# Patient Record
Sex: FEMALE | Race: BLACK OR AFRICAN AMERICAN | Employment: FULL TIME | URBAN - METROPOLITAN AREA
[De-identification: names, ages, dates, MRNs, and addresses within clinical notes are randomized per-mention and may not be internally consistent; named-entity substitution may affect disease eponyms.]

---

## 2017-10-04 ENCOUNTER — OFFICE VISIT (OUTPATIENT)
Dept: INTERNAL MEDICINE CLINIC | Age: 27
End: 2017-10-04

## 2017-10-04 VITALS
BODY MASS INDEX: 50.02 KG/M2 | HEART RATE: 86 BPM | HEIGHT: 64 IN | SYSTOLIC BLOOD PRESSURE: 110 MMHG | DIASTOLIC BLOOD PRESSURE: 68 MMHG | WEIGHT: 293 LBS | OXYGEN SATURATION: 98 %

## 2017-10-04 DIAGNOSIS — Z23 NEED FOR TDAP VACCINATION: ICD-10-CM

## 2017-10-04 DIAGNOSIS — Z11.3 SCREEN FOR STD (SEXUALLY TRANSMITTED DISEASE): ICD-10-CM

## 2017-10-04 DIAGNOSIS — E55.9 VITAMIN D DEFICIENCY: ICD-10-CM

## 2017-10-04 DIAGNOSIS — F41.9 ANXIETY: ICD-10-CM

## 2017-10-04 DIAGNOSIS — F32.A DEPRESSION, UNSPECIFIED DEPRESSION TYPE: Primary | ICD-10-CM

## 2017-10-04 DIAGNOSIS — Z76.89 ENCOUNTER TO ESTABLISH CARE: ICD-10-CM

## 2017-10-04 DIAGNOSIS — Z23 NEED FOR INFLUENZA VACCINATION: ICD-10-CM

## 2017-10-04 DIAGNOSIS — Z23 FLU VACCINE NEED: ICD-10-CM

## 2017-10-04 DIAGNOSIS — Z00.00 HEALTHCARE MAINTENANCE: ICD-10-CM

## 2017-10-04 PROCEDURE — 90471 IMMUNIZATION ADMIN: CPT | Performed by: NURSE PRACTITIONER

## 2017-10-04 PROCEDURE — 4004F PT TOBACCO SCREEN RCVD TLK: CPT | Performed by: NURSE PRACTITIONER

## 2017-10-04 PROCEDURE — G8417 CALC BMI ABV UP PARAM F/U: HCPCS | Performed by: NURSE PRACTITIONER

## 2017-10-04 PROCEDURE — G8484 FLU IMMUNIZE NO ADMIN: HCPCS | Performed by: NURSE PRACTITIONER

## 2017-10-04 PROCEDURE — G8427 DOCREV CUR MEDS BY ELIG CLIN: HCPCS | Performed by: NURSE PRACTITIONER

## 2017-10-04 PROCEDURE — 99203 OFFICE O/P NEW LOW 30 MIN: CPT | Performed by: NURSE PRACTITIONER

## 2017-10-04 PROCEDURE — 90686 IIV4 VACC NO PRSV 0.5 ML IM: CPT | Performed by: NURSE PRACTITIONER

## 2017-10-04 RX ORDER — BUSPIRONE HYDROCHLORIDE 7.5 MG/1
7.5 TABLET ORAL 3 TIMES DAILY
Qty: 90 TABLET | Refills: 0 | Status: SHIPPED | OUTPATIENT
Start: 2017-10-04 | End: 2018-02-10

## 2017-10-04 ASSESSMENT — PATIENT HEALTH QUESTIONNAIRE - PHQ9
2. FEELING DOWN, DEPRESSED OR HOPELESS: 0
SUM OF ALL RESPONSES TO PHQ QUESTIONS 1-9: 0
1. LITTLE INTEREST OR PLEASURE IN DOING THINGS: 0
SUM OF ALL RESPONSES TO PHQ9 QUESTIONS 1 & 2: 0

## 2017-10-04 NOTE — PROGRESS NOTES
Subjective:      Patient ID: Allan Olguin is a 33 y.o. female. HPI Comments: Patient presents with:  Established New Doctor: PCP. She reports a history of postpartum depression after birth of both children. Admits to a history of a suicide attempt (overdose on depression medication). She was admitted, treated and attended two meetings at [facility]. She has not been taking any medications for depression since overdose. She is unsure of medication she was previously taking. She admits to smoking as coping mechanism for depression and anxiety. Review of Systems   Psychiatric/Behavioral: Positive for dysphoric mood. The patient is nervous/anxious. All other systems reviewed and are negative.     Past Medical History:   Diagnosis Date    Postpartum depression 2015 & 2016    Psychiatric problem     Suicide attempt 2015    overdose on depression medication    UTI (urinary tract infection) 06/09/2016     Past Surgical History:   Procedure Laterality Date    CHOLECYSTECTOMY      TUBAL LIGATION  2013    TYMPANOSTOMY TUBE PLACEMENT Bilateral     as a child     Family History   Problem Relation Age of Onset    No Known Problems Mother     Mental Illness Father      PTSD- [employer]    ADHD Brother     No Known Problems Maternal Aunt     No Known Problems Maternal Uncle     No Known Problems Paternal Aunt     No Known Problems Paternal Uncle     Diabetes Maternal Grandmother     Hypertension Maternal Grandmother     Glaucoma Maternal Grandfather     No Known Problems Paternal Grandmother     No Known Problems Paternal Grandfather     No Known Problems Other     Rheum Arthritis Neg Hx     Osteoarthritis Neg Hx     Asthma Neg Hx     Breast Cancer Neg Hx     Cancer Neg Hx     Heart Failure Neg Hx     High Cholesterol Neg Hx     Migraines Neg Hx     Ovarian Cancer Neg Hx     Rashes/Skin Problems Neg Hx     Seizures Neg Hx     Stroke Neg Hx     Thyroid Disease Neg Hx      Social

## 2017-10-04 NOTE — MR AVS SNAPSHOT
right after having the test done may help you to stop smoking. Your doctor can also discuss with you all of your options and refer you to smoking-cessation support groups. You may wish to use nicotine replacement (gum, patches, inhaler) or one of the prescription medications that have been shown to increase quit rates and prolong abstinence from smoking. But whatever you and your doctor decide on these matters, it will still be you who decides when an how to quit. Here are some techniques:   Switch Brands   Switch to a brand you find distasteful. Change to a brand that is low in tar and nicotine a couple of weeks before your target quit date. This will help change your smoking behavior. However, do not smoke more cigarettes, inhale them more often or more deeply, or place your fingertips over the holes in the filters. All of these actions will increase your nicotine intake, and the idea is to get your body used to functioning without nicotine. Cut Down the Number of Cigarettes You Smoke   Smoke only half of each cigarette. Each day, postpone the lighting of your first cigarette by one hour. Decide you'll only smoke during odd or even hours of the day. Decide beforehand how many cigarettes you'll smoke during the day. For each additional cigarette, give a dollar to your favorite paul. Change your eating habits to help you cut down. For example, drink milk, which many people consider incompatible with smoking. End meals or snacks with something that won't lead to a cigarette. Reach for a glass of juice instead of a cigarette for a \"pick-me-up. \"   Remember: Cutting down can help you quit, but it's not a substitute for quitting. If you're down to about seven cigarettes a day, it's time to set your target quit date, and get ready to stick to it. Don't Smoke \"Automatically\"   Smoke only those cigarettes you really want. Catch yourself before you light up a cigarette out of pure habit. Don't empty your ashtrays. This will remind you of how many cigarettes you've smoked each day, and the sight and the smell of stale cigarettes butts will be very unpleasant. Make yourself aware of each cigarette by using the opposite hand or putting cigarettes in an unfamiliar location or a different pocket to break the automatic reach. If you light up many times during the day without even thinking about it, try to look in a mirror each time you put a match to your cigarette. You may decide you don't need it. Make Smoking Inconvenient   Stop buying cigarettes by the carton. Wait until one pack is empty before you buy another. Stop carrying cigarettes with you at home or at work. Make them difficult to get to. Make Smoking Unpleasant   Smoke only under circumstances that aren't especially pleasurable for you. If you like to smoke with others, smoke alone. Turn your chair to an empty corner and focus only on the cigarette you are smoking and all its many negative effects. Collect all your cigarette butts in one large glass container as a visual reminder of the filth made by smoking. Just Before Quitting   Practice going without cigarettes. Don't think of never smoking again. Think of quitting in terms of one day at a time . Tell yourself you won't smoke today, and then don't. Clean your clothes to rid them of the cigarette smell, which can linger a long time. On the Day You Quit   Throw away all your cigarettes and matches. Hide your lighters and ashtrays. Visit the dentist and have your teeth cleaned to get rid of tobacco stains. Notice how nice they look and resolve to keep them that way. Make a list of things you'd like to buy for yourself or someone else. Estimate the cost in terms of packs of cigarettes, and put the money aside to buy these presents. Keep very busy on the big day. Go to the movies, exercise, take long walks, or go bike riding. Offers individual hypnosis, behavior modifications, and counseling in this program. Three sessions over 2-week period  $155 (total cost)    Nicotine Anonymous: [phone]  Open group cessation - everyone welcome     American Cancer Society: 329.528.6423    American Lung Association: 1-800-LUNG-[place]    On-line help:  www.cancer. org  www.quitnet. org  www. Vimagino. Where  www.stopsmokingsuppport. Where  www. ffsonline. org        Vaccine Information Sheet, \"Influenza - Inactivated\"  given to Chantell Hines, or parent/legal guardian of  Chantell Hines and verbalized understanding. Patient responses:    Have you ever had a reaction to a flu vaccine? No  Are you able to eat eggs without adverse effects? Yes  Do you have any current illness? No  Have you ever had [place] Syndrome? No    Flu vaccine given per order. Please see immunization tab. Today's Medication Changes          These changes are accurate as of: 10/4/17  3:35 PM.  If you have any questions, ask your nurse or doctor. START taking these medications           busPIRone 7.5 MG tablet   Commonly known as:  BUSPAR   Instructions: Take 1 tablet by mouth 3 times daily   Quantity:  90 tablet   Refills:  0   Started by:  Andrea Cruz CNP       sertraline 50 MG tablet   Commonly known as:  ZOLOFT   Instructions:   Take 1 tablet by mouth daily   Quantity:  30 tablet   Refills:  1   Started by:  Andrea Cruz CNP         STOP taking these medications           ketorolac 10 MG tablet   Commonly known as:  TORADOL   Stopped by:  Andrea Cruz CNP            Where to Get Your Medications      These medications were sent to [facility], Alvino Pang, [address] [place] [address]     Phone:  [phone]     busPIRone 7.5 MG tablet    sertraline 50 MG tablet               Your Current Medications Are If you have questions, please contact the physician practice where you receive care. Remember, MyChart is NOT to be used for urgent needs. For medical emergencies, dial 911. For questions regarding your [facility] account call [phone]. If you have a clinical question, please call your doctor's office.

## 2017-10-04 NOTE — PATIENT INSTRUCTIONS
weeks before your target quit date. This will help change your smoking behavior. However, do not smoke more cigarettes, inhale them more often or more deeply, or place your fingertips over the holes in the filters. All of these actions will increase your nicotine intake, and the idea is to get your body used to functioning without nicotine. Cut Down the Number of Cigarettes You Smoke   Smoke only half of each cigarette. Each day, postpone the lighting of your first cigarette by one hour. Decide you'll only smoke during odd or even hours of the day. Decide beforehand how many cigarettes you'll smoke during the day. For each additional cigarette, give a dollar to your favorite paul. Change your eating habits to help you cut down. For example, drink milk, which many people consider incompatible with smoking. End meals or snacks with something that won't lead to a cigarette. Reach for a glass of juice instead of a cigarette for a \"pick-me-up. \"   Remember: Cutting down can help you quit, but it's not a substitute for quitting. If you're down to about seven cigarettes a day, it's time to set your target quit date, and get ready to stick to it. Don't Smoke \"Automatically\"   Smoke only those cigarettes you really want. Catch yourself before you light up a cigarette out of pure habit. Don't empty your ashtrays. This will remind you of how many cigarettes you've smoked each day, and the sight and the smell of stale cigarettes butts will be very unpleasant. Make yourself aware of each cigarette by using the opposite hand or putting cigarettes in an unfamiliar location or a different pocket to break the automatic reach. If you light up many times during the day without even thinking about it, try to look in a mirror each time you put a match to your cigarette. You may decide you don't need it. Make Smoking Inconvenient   Stop buying cigarettes by the carton.  Wait until one pack is empty before you buy after you quit, spend as much free time as possible in places where smoking isn't allowed, such as [address] [place] [address], museums, theaters, department stores, and churches. Drink large quantities of water and fruit juice (but avoid sodas that contain caffeine). Try to avoid alcohol, coffee, and other beverages that you associate with cigarette smoking. Strike up conversation instead of a match for a cigarette. If you miss the sensation of having a cigarette in your hand, play with something elsea pencil, a paper clip, a marble. If you miss having something in your mouth, try toothpicks or a fake cigarette. Here are some useful phone numbers and resources for you to help your smoking cessation. [address] [place] [address] Street: [phone]  Smoking cessation programs in [facility]: [phone]  \"Freshstart\" a 4-session program for smoking cessation - group sessions    [facility] Use Prevention and Control [facility]: 1800-QUIT-NOW     [facility]: 972-801-SNAH  \"Freshstart' - 4-session program for smoking cessation - group sessions    Vanessa Szymanski: [phone]  Personal Smoking cessation consultations - teens and adults  [place] By appointment $343    [facility]: [phone]  Offers individual hypnosis, behavior modifications, and counseling in this program. Three sessions over 2-week period  $155 (total cost)    Nicotine Anonymous: [phone]  Open group cessation - everyone welcome     American Cancer Society: [phone]    American Lung Association: 1-800-LUNG-USA    On-line help:  www.cancer. org  www.quitnet. org  www. whyquit. com  www.stopsmokingsuppport. com  www. ffsonline. org        Vaccine Information Sheet, \"Influenza - Inactivated\"  given to Alma Okeefe, or parent/legal guardian of  Alma Okeefe and verbalized understanding. Patient responses:    Have you ever had a reaction to a flu vaccine?  No  Are you able to eat eggs without adverse

## 2018-05-17 ENCOUNTER — OFFICE VISIT (OUTPATIENT)
Dept: INTERNAL MEDICINE CLINIC | Age: 28
End: 2018-05-17

## 2018-05-17 VITALS
WEIGHT: 284 LBS | OXYGEN SATURATION: 96 % | SYSTOLIC BLOOD PRESSURE: 110 MMHG | DIASTOLIC BLOOD PRESSURE: 80 MMHG | BODY MASS INDEX: 48.75 KG/M2 | HEART RATE: 84 BPM

## 2018-05-17 DIAGNOSIS — F32.A DEPRESSION, UNSPECIFIED DEPRESSION TYPE: ICD-10-CM

## 2018-05-17 DIAGNOSIS — Z00.00 HEALTHCARE MAINTENANCE: ICD-10-CM

## 2018-05-17 DIAGNOSIS — Z00.00 ROUTINE GENERAL MEDICAL EXAMINATION AT A HEALTH CARE FACILITY: ICD-10-CM

## 2018-05-17 DIAGNOSIS — Z23 NEED FOR 23-POLYVALENT PNEUMOCOCCAL POLYSACCHARIDE VACCINE: ICD-10-CM

## 2018-05-17 DIAGNOSIS — Z00.01 ENCOUNTER FOR WELL ADULT EXAM WITH ABNORMAL FINDINGS: Primary | ICD-10-CM

## 2018-05-17 PROCEDURE — G8417 CALC BMI ABV UP PARAM F/U: HCPCS | Performed by: NURSE PRACTITIONER

## 2018-05-17 PROCEDURE — G8427 DOCREV CUR MEDS BY ELIG CLIN: HCPCS | Performed by: NURSE PRACTITIONER

## 2018-05-17 PROCEDURE — 90732 PPSV23 VACC 2 YRS+ SUBQ/IM: CPT | Performed by: NURSE PRACTITIONER

## 2018-05-17 PROCEDURE — 99395 PREV VISIT EST AGE 18-39: CPT | Performed by: NURSE PRACTITIONER

## 2018-05-17 PROCEDURE — 4004F PT TOBACCO SCREEN RCVD TLK: CPT | Performed by: NURSE PRACTITIONER

## 2018-05-17 PROCEDURE — 90471 IMMUNIZATION ADMIN: CPT | Performed by: NURSE PRACTITIONER

## 2018-06-12 ENCOUNTER — OFFICE VISIT (OUTPATIENT)
Dept: INTERNAL MEDICINE CLINIC | Age: 28
End: 2018-06-12

## 2018-06-12 VITALS
HEART RATE: 89 BPM | SYSTOLIC BLOOD PRESSURE: 118 MMHG | DIASTOLIC BLOOD PRESSURE: 70 MMHG | WEIGHT: 286 LBS | OXYGEN SATURATION: 98 % | BODY MASS INDEX: 49.09 KG/M2

## 2018-06-12 DIAGNOSIS — F32.A DEPRESSION, UNSPECIFIED DEPRESSION TYPE: Primary | ICD-10-CM

## 2018-06-12 DIAGNOSIS — E66.01 MORBID OBESITY (HCC): ICD-10-CM

## 2018-06-12 PROCEDURE — G8417 CALC BMI ABV UP PARAM F/U: HCPCS | Performed by: NURSE PRACTITIONER

## 2018-06-12 PROCEDURE — 4004F PT TOBACCO SCREEN RCVD TLK: CPT | Performed by: NURSE PRACTITIONER

## 2018-06-12 PROCEDURE — G8427 DOCREV CUR MEDS BY ELIG CLIN: HCPCS | Performed by: NURSE PRACTITIONER

## 2018-06-12 PROCEDURE — 99213 OFFICE O/P EST LOW 20 MIN: CPT | Performed by: NURSE PRACTITIONER

## 2018-12-22 ENCOUNTER — HOSPITAL ENCOUNTER (EMERGENCY)
Age: 28
Discharge: HOME | End: 2018-12-22
Attending: EMERGENCY MEDICINE
Payer: COMMERCIAL

## 2018-12-22 VITALS
BODY MASS INDEX: 50.02 KG/M2 | WEIGHT: 293 LBS | SYSTOLIC BLOOD PRESSURE: 151 MMHG | HEART RATE: 102 BPM | TEMPERATURE: 98.2 F | RESPIRATION RATE: 18 BRPM | DIASTOLIC BLOOD PRESSURE: 105 MMHG | HEIGHT: 64 IN | OXYGEN SATURATION: 96 %

## 2018-12-22 DIAGNOSIS — L02.91 ABSCESS: Primary | ICD-10-CM

## 2018-12-22 PROCEDURE — 99282 EMERGENCY DEPT VISIT SF MDM: CPT

## 2018-12-22 RX ORDER — CEPHALEXIN 500 MG/1
500 CAPSULE ORAL 4 TIMES DAILY
Qty: 40 CAPSULE | Refills: 0 | Status: SHIPPED | OUTPATIENT
Start: 2018-12-22 | End: 2019-01-01

## 2018-12-22 RX ORDER — SULFAMETHOXAZOLE AND TRIMETHOPRIM 800; 160 MG/1; MG/1
1 TABLET ORAL 2 TIMES DAILY
Qty: 20 TABLET | Refills: 0 | Status: SHIPPED | OUTPATIENT
Start: 2018-12-22 | End: 2019-01-01

## 2018-12-22 ASSESSMENT — PAIN DESCRIPTION - LOCATION: LOCATION: GROIN

## 2018-12-22 ASSESSMENT — PAIN SCALES - GENERAL: PAINLEVEL_OUTOF10: 7

## 2018-12-22 ASSESSMENT — PAIN DESCRIPTION - PROGRESSION: CLINICAL_PROGRESSION: GRADUALLY WORSENING

## 2018-12-22 ASSESSMENT — PAIN DESCRIPTION - FREQUENCY: FREQUENCY: CONTINUOUS

## 2018-12-22 ASSESSMENT — PAIN DESCRIPTION - DESCRIPTORS: DESCRIPTORS: BURNING

## 2018-12-22 ASSESSMENT — PAIN DESCRIPTION - PAIN TYPE: TYPE: ACUTE PAIN

## 2018-12-22 ASSESSMENT — PAIN DESCRIPTION - ONSET: ONSET: GRADUAL

## 2018-12-22 NOTE — ED PROVIDER NOTES
eMERGENCY dEPARTMENT eNCOUnter        [facility]    Chief Complaint   Patient presents with    Abscess     right vaginal/ pubic area abscess first noticed two days ago, has hx of abscess. tried hot compress at home prior to ED visit        OWEN Scott is a 33 y.o. female who presents  With an abscess in the right inguinal area. Patient states that she noticed that over the past 2 days. She has no fever, nausea, or vomiting. No exacerbating factor other than touching the area which makes it hurt. Has not been draining. She has not taken any medication home and has not been on antibiotic recently. She has no urinary symptoms. REVIEW OF SYSTEMS    See [facility] for further details. Review of systems otherwise negative. temperature systems reviewed and is otherwise negative  PAST MEDICAL HISTORY    Past Medical History:   Diagnosis Date    Postpartum depression 2015 & 2016    Psychiatric problem     Suicide attempt ([address] 2015    overdose on depression medication    UTI (urinary tract infection) 06/09/2016       SURGICAL HISTORY    Past Surgical History:   Procedure Laterality Date    CHOLECYSTECTOMY      TUBAL LIGATION  2013    TYMPANOSTOMY TUBE PLACEMENT Bilateral     as a child       CURRENT MEDICATIONS    Current Outpatient Rx   Medication Sig Dispense Refill    cephALEXin (KEFLEX) 500 MG capsule Take 1 capsule by mouth 4 times daily for 10 days 40 capsule 0    sulfamethoxazole-trimethoprim (BACTRIM DS) 800-160 MG per tablet Take 1 tablet by mouth 2 times daily for 10 days 20 tablet 0    sertraline (ZOLOFT) 50 MG tablet Take 1 tablet by mouth daily 30 tablet 3    naproxen (NAPROSYN) 500 MG tablet Take 1 tablet by mouth 2 times daily (with meals) 20 tablet 0    Magic Mouthwash (MIRACLE MOUTHWASH) Swish and spit 10 mLs 4 times daily as needed for Irritation Dispense as Magic Mouthwash.   Please add Equal Parts: 60 mL Maalox, 60 mL Viscous Lidocaine, 60 mL Benadryl to 60mL of

## 2018-12-22 NOTE — ED NOTES
Pt states she has an abscess in groin area. Pt has tried epsom salt and soak in a warm bath, no improvement. Pt has a burning sensation. Pt states it is red and has a vasques.      Priyanka Corbett RN  12/22/18 3131

## 2019-07-09 ENCOUNTER — HOSPITAL ENCOUNTER (EMERGENCY)
Age: 29
Discharge: HOME | End: 2019-07-09

## 2019-07-09 VITALS
DIASTOLIC BLOOD PRESSURE: 86 MMHG | SYSTOLIC BLOOD PRESSURE: 123 MMHG | WEIGHT: 293 LBS | TEMPERATURE: 97.2 F | RESPIRATION RATE: 18 BRPM | HEIGHT: 64 IN | HEART RATE: 68 BPM | BODY MASS INDEX: 50.02 KG/M2 | OXYGEN SATURATION: 100 %

## 2019-07-09 DIAGNOSIS — R20.2 PARESTHESIAS: ICD-10-CM

## 2019-07-09 DIAGNOSIS — A59.9 TRICHOMONAS INFECTION: ICD-10-CM

## 2019-07-09 DIAGNOSIS — R58 ECCHYMOSIS: ICD-10-CM

## 2019-07-09 DIAGNOSIS — I83.90 RETICULAR VENOUS VARICES: ICD-10-CM

## 2019-07-09 DIAGNOSIS — M54.50 ACUTE BILATERAL LOW BACK PAIN WITHOUT SCIATICA: Primary | ICD-10-CM

## 2019-07-09 LAB
A/G RATIO: 0.9 (ref 1.1–2.2)
ALBUMIN SERPL-MCNC: 3.8 G/DL (ref 3.4–5)
ALP BLD-CCNC: 70 U/L (ref 40–129)
ALT SERPL-CCNC: 14 U/L (ref 10–40)
ANION GAP SERPL CALCULATED.3IONS-SCNC: 11 MMOL/L (ref 3–16)
APTT: 33.4 SEC (ref 26–36)
AST SERPL-CCNC: 15 U/L (ref 15–37)
BACTERIA: ABNORMAL /HPF
BASOPHILS ABSOLUTE: 0 K/UL (ref 0–0.2)
BASOPHILS RELATIVE PERCENT: 0.5 %
BILIRUB SERPL-MCNC: 0.3 MG/DL (ref 0–1)
BILIRUBIN URINE: NEGATIVE
BLOOD, URINE: ABNORMAL
BUN BLDV-MCNC: 10 MG/DL (ref 7–20)
CALCIUM SERPL-MCNC: 9.3 MG/DL (ref 8.3–10.6)
CHLORIDE BLD-SCNC: 105 MMOL/L (ref 99–110)
CLARITY: ABNORMAL
CO2: 21 MMOL/L (ref 21–32)
COLOR: YELLOW
CREAT SERPL-MCNC: 0.5 MG/DL (ref 0.6–1.1)
EOSINOPHILS ABSOLUTE: 0.1 K/UL (ref 0–0.6)
EOSINOPHILS RELATIVE PERCENT: 1.4 %
EPITHELIAL CELLS, UA: 2 /HPF (ref 0–5)
GFR AFRICAN AMERICAN: >60
GFR NON-AFRICAN AMERICAN: >60
GLOBULIN: 4.1 G/DL
GLUCOSE BLD-MCNC: 80 MG/DL (ref 70–99)
GLUCOSE URINE: NEGATIVE MG/DL
HCG QUALITATIVE: NEGATIVE
HCT VFR BLD CALC: 43 % (ref 36–48)
HEMOGLOBIN: 14.1 G/DL (ref 12–16)
HYALINE CASTS: 1 /LPF (ref 0–8)
INR BLD: 1.03 (ref 0.86–1.14)
KETONES, URINE: NEGATIVE MG/DL
LEUKOCYTE ESTERASE, URINE: ABNORMAL
LYMPHOCYTES ABSOLUTE: 2 K/UL (ref 1–5.1)
LYMPHOCYTES RELATIVE PERCENT: 25.6 %
MCH RBC QN AUTO: 32.5 PG (ref 26–34)
MCHC RBC AUTO-ENTMCNC: 32.8 G/DL (ref 31–36)
MCV RBC AUTO: 99 FL (ref 80–100)
MICROSCOPIC EXAMINATION: YES
MONOCYTES ABSOLUTE: 0.3 K/UL (ref 0–1.3)
MONOCYTES RELATIVE PERCENT: 4.2 %
NEUTROPHILS ABSOLUTE: 5.2 K/UL (ref 1.7–7.7)
NEUTROPHILS RELATIVE PERCENT: 68.3 %
NITRITE, URINE: NEGATIVE
PDW BLD-RTO: 13.4 % (ref 12.4–15.4)
PH UA: 7.5 (ref 5–8)
PLATELET # BLD: 185 K/UL (ref 135–450)
PMV BLD AUTO: 9.1 FL (ref 5–10.5)
POTASSIUM SERPL-SCNC: 3.9 MMOL/L (ref 3.5–5.1)
PROTEIN UA: NEGATIVE MG/DL
PROTHROMBIN TIME: 11.7 SEC (ref 9.8–13)
RBC # BLD: 4.34 M/UL (ref 4–5.2)
RBC UA: >100 /HPF (ref 0–2)
SODIUM BLD-SCNC: 137 MMOL/L (ref 136–145)
SPECIFIC GRAVITY UA: 1.02 (ref 1–1.03)
TOTAL PROTEIN: 7.9 G/DL (ref 6.4–8.2)
TRICHOMONAS: PRESENT /HPF
URINE REFLEX TO CULTURE: YES
URINE TYPE: ABNORMAL
UROBILINOGEN, URINE: 0.2 E.U./DL
WBC # BLD: 7.6 K/UL (ref 4–11)
WBC UA: 53 /HPF (ref 0–5)

## 2019-07-09 PROCEDURE — 87491 CHLMYD TRACH DNA AMP PROBE: CPT

## 2019-07-09 PROCEDURE — 87077 CULTURE AEROBIC IDENTIFY: CPT

## 2019-07-09 PROCEDURE — 84703 CHORIONIC GONADOTROPIN ASSAY: CPT

## 2019-07-09 PROCEDURE — 87086 URINE CULTURE/COLONY COUNT: CPT

## 2019-07-09 PROCEDURE — 96372 THER/PROPH/DIAG INJ SC/IM: CPT

## 2019-07-09 PROCEDURE — 99283 EMERGENCY DEPT VISIT LOW MDM: CPT

## 2019-07-09 PROCEDURE — 6370000000 HC RX 637 (ALT 250 FOR IP): Performed by: PHYSICIAN ASSISTANT

## 2019-07-09 PROCEDURE — 85730 THROMBOPLASTIN TIME PARTIAL: CPT

## 2019-07-09 PROCEDURE — 80053 COMPREHEN METABOLIC PANEL: CPT

## 2019-07-09 PROCEDURE — 6360000002 HC RX W HCPCS: Performed by: PHYSICIAN ASSISTANT

## 2019-07-09 PROCEDURE — 85025 COMPLETE CBC W/AUTO DIFF WBC: CPT

## 2019-07-09 PROCEDURE — 81001 URINALYSIS AUTO W/SCOPE: CPT

## 2019-07-09 PROCEDURE — 87591 N.GONORRHOEAE DNA AMP PROB: CPT

## 2019-07-09 PROCEDURE — 85610 PROTHROMBIN TIME: CPT

## 2019-07-09 RX ORDER — CEFTRIAXONE SODIUM 250 MG/1
250 INJECTION, POWDER, FOR SOLUTION INTRAMUSCULAR; INTRAVENOUS ONCE
Status: COMPLETED | OUTPATIENT
Start: 2019-07-09 | End: 2019-07-09

## 2019-07-09 RX ORDER — NAPROXEN 500 MG/1
500 TABLET ORAL 2 TIMES DAILY
Qty: 20 TABLET | Refills: 0 | Status: SHIPPED | OUTPATIENT
Start: 2019-07-09 | End: 2020-05-06

## 2019-07-09 RX ORDER — METRONIDAZOLE 250 MG/1
2000 TABLET ORAL ONCE
Status: COMPLETED | OUTPATIENT
Start: 2019-07-09 | End: 2019-07-09

## 2019-07-09 RX ORDER — AZITHROMYCIN 250 MG/1
1000 TABLET, FILM COATED ORAL ONCE
Status: COMPLETED | OUTPATIENT
Start: 2019-07-09 | End: 2019-07-09

## 2019-07-09 RX ADMIN — METRONIDAZOLE 2000 MG: 250 TABLET, FILM COATED ORAL at 14:42

## 2019-07-09 RX ADMIN — AZITHROMYCIN MONOHYDRATE 1000 MG: 250 TABLET ORAL at 14:43

## 2019-07-09 RX ADMIN — CEFTRIAXONE SODIUM 250 MG: 250 INJECTION, POWDER, FOR SOLUTION INTRAMUSCULAR; INTRAVENOUS at 14:44

## 2019-07-09 ASSESSMENT — ENCOUNTER SYMPTOMS
SHORTNESS OF BREATH: 0
BACK PAIN: 1
NAUSEA: 0
COLOR CHANGE: 1
ABDOMINAL PAIN: 0
DIARRHEA: 0
BLOOD IN STOOL: 0
VOMITING: 0

## 2019-07-09 ASSESSMENT — PAIN DESCRIPTION - PAIN TYPE: TYPE: ACUTE PAIN

## 2019-07-09 ASSESSMENT — PAIN SCALES - GENERAL: PAINLEVEL_OUTOF10: 7

## 2019-07-09 ASSESSMENT — PAIN DESCRIPTION - LOCATION: LOCATION: LEG

## 2019-07-09 NOTE — ED NOTES
Pt resting in bed.  Patient encouraged to try for urine specimen       Francisca Dubose RN  07/09/19 8240

## 2019-07-09 NOTE — ED PROVIDER NOTES
7/9/19 5534)       Patient presented with some discoloration of her bilateral legs with some unexplained ecchymosis that is very mild on the right thigh. With some back pain. Laboratory testing is unremarkable. Urinalysis does reveal some white blood cells with red blood cells and trichomonas present. She does want empiric treatment for gonorrhea and chlamydia. She was treated for gonorrhea, chlamydia and trichomonas here. Abdominal exam is benign. Low suspicion for tubo-ovarian abscess, PID, acute abdomen, cauda equina, epidural abscess, cord injury. Low suspicion for DVT or arterial occlusion or cellulitis. She has been able to easily ambulates. Has findings most consistent with mild ecchymosis and spider veins. She will follow-up with her family physician return if any worsening of symptoms or problems at home. She understood that her sexual partners any tested and treated also. She was stable discharge. The patient tolerated their visit well. I evaluated the patient. The physician was available for consultation as needed. The patient and / or the family were informed of the results of anytests, a time was given to answer questions, a plan was proposed and they agreed with plan. CLINICAL IMPRESSION:  1. Acute bilateral low back pain without sciatica    2. Paresthesias    3. Ecchymosis    4. Reticular venous varices    5.  Trichomonas infection        DISPOSITION Decision To Discharge 07/09/2019 03:04:51 PM      PATIENT REFERRED TO:  Loki Tolliver APRN - CNP  [address] [facility]  [phone]    Schedule an appointment as soon as possible for a visit in 3 days  For re-check    [facility] Emergency Department  [address] [place] [address]  [phone]    As needed      DISCHARGE MEDICATIONS:  Discharge Medication List as of 7/9/2019  3:04 PM      START taking these medications    Details   !! naproxen (NAPROSYN) 500 MG tablet Take 1 tablet by

## 2019-07-10 LAB
C TRACH DNA GENITAL QL NAA+PROBE: NEGATIVE
N. GONORRHOEAE DNA: NEGATIVE

## 2019-07-14 LAB
ORGANISM: ABNORMAL
URINE CULTURE, ROUTINE: ABNORMAL
URINE CULTURE, ROUTINE: ABNORMAL

## 2019-07-15 NOTE — ED NOTES
+ culture reviewed with DAMI Prakash CNP; if pt not feeling better she needs to be started on Bactrim DS 1 tab BID x 7 days; pt sts some better, bruising decreased.      Mariah Fuller RN  07/15/19 1002

## 2020-05-05 ENCOUNTER — OFFICE VISIT (OUTPATIENT)
Dept: PRIMARY CARE CLINIC | Age: 30
End: 2020-05-05
Payer: COMMERCIAL

## 2020-05-05 ENCOUNTER — HOSPITAL ENCOUNTER (EMERGENCY)
Age: 30
Discharge: LEFT BEFORE DISCHARGE | End: 2020-05-05

## 2020-05-05 ENCOUNTER — TELEPHONE (OUTPATIENT)
Dept: PRIMARY CARE CLINIC | Age: 30
End: 2020-05-05

## 2020-05-05 VITALS — HEART RATE: 62 BPM | OXYGEN SATURATION: 97 % | TEMPERATURE: 100.2 F

## 2020-05-05 LAB — S PYO AG THROAT QL: NORMAL

## 2020-05-05 PROCEDURE — 87880 STREP A ASSAY W/OPTIC: CPT | Performed by: INTERNAL MEDICINE

## 2020-05-05 PROCEDURE — 4004F PT TOBACCO SCREEN RCVD TLK: CPT | Performed by: INTERNAL MEDICINE

## 2020-05-05 PROCEDURE — 99213 OFFICE O/P EST LOW 20 MIN: CPT | Performed by: INTERNAL MEDICINE

## 2020-05-05 PROCEDURE — G8428 CUR MEDS NOT DOCUMENT: HCPCS | Performed by: INTERNAL MEDICINE

## 2020-05-05 PROCEDURE — G8417 CALC BMI ABV UP PARAM F/U: HCPCS | Performed by: INTERNAL MEDICINE

## 2020-05-06 ENCOUNTER — APPOINTMENT (OUTPATIENT)
Dept: CT IMAGING | Age: 30
End: 2020-05-06

## 2020-05-06 ENCOUNTER — TELEPHONE (OUTPATIENT)
Dept: INTERNAL MEDICINE CLINIC | Age: 30
End: 2020-05-06

## 2020-05-06 ENCOUNTER — HOSPITAL ENCOUNTER (EMERGENCY)
Age: 30
Discharge: HOME | End: 2020-05-06
Attending: EMERGENCY MEDICINE

## 2020-05-06 VITALS
WEIGHT: 293 LBS | TEMPERATURE: 99.1 F | OXYGEN SATURATION: 98 % | BODY MASS INDEX: 57.5 KG/M2 | RESPIRATION RATE: 14 BRPM | HEART RATE: 96 BPM | DIASTOLIC BLOOD PRESSURE: 77 MMHG | SYSTOLIC BLOOD PRESSURE: 134 MMHG

## 2020-05-06 LAB
ANION GAP SERPL CALCULATED.3IONS-SCNC: 13 MMOL/L (ref 3–16)
BASOPHILS ABSOLUTE: 0 K/UL (ref 0–0.2)
BASOPHILS RELATIVE PERCENT: 0 %
BUN BLDV-MCNC: 10 MG/DL (ref 7–20)
CALCIUM SERPL-MCNC: 9 MG/DL (ref 8.3–10.6)
CHLORIDE BLD-SCNC: 103 MMOL/L (ref 99–110)
CO2: 20 MMOL/L (ref 21–32)
CREAT SERPL-MCNC: 0.6 MG/DL (ref 0.6–1.1)
EOSINOPHILS ABSOLUTE: 0 K/UL (ref 0–0.6)
EOSINOPHILS RELATIVE PERCENT: 0 %
GFR AFRICAN AMERICAN: >60
GFR NON-AFRICAN AMERICAN: >60
GLUCOSE BLD-MCNC: 140 MG/DL (ref 70–99)
HCG QUALITATIVE: NEGATIVE
HCT VFR BLD CALC: 39.9 % (ref 36–48)
HEMOGLOBIN: 13.3 G/DL (ref 12–16)
LACTIC ACID: 1.2 MMOL/L (ref 0.4–2)
LYMPHOCYTES ABSOLUTE: 1.5 K/UL (ref 1–5.1)
LYMPHOCYTES RELATIVE PERCENT: 9 %
MCH RBC QN AUTO: 32.8 PG (ref 26–34)
MCHC RBC AUTO-ENTMCNC: 33.3 G/DL (ref 31–36)
MCV RBC AUTO: 98.2 FL (ref 80–100)
MONOCYTES ABSOLUTE: 0.3 K/UL (ref 0–1.3)
MONOCYTES RELATIVE PERCENT: 2 %
NEUTROPHILS ABSOLUTE: 14.9 K/UL (ref 1.7–7.7)
NEUTROPHILS RELATIVE PERCENT: 89 %
PDW BLD-RTO: 13.9 % (ref 12.4–15.4)
PLATELET # BLD: 218 K/UL (ref 135–450)
PLATELET SLIDE REVIEW: ADEQUATE
PMV BLD AUTO: 9.5 FL (ref 5–10.5)
POTASSIUM REFLEX MAGNESIUM: 4.1 MMOL/L (ref 3.5–5.1)
RBC # BLD: 4.06 M/UL (ref 4–5.2)
RBC # BLD: NORMAL 10*6/UL
SARS-COV-2: NOT DETECTED
SLIDE REVIEW: ABNORMAL
SODIUM BLD-SCNC: 136 MMOL/L (ref 136–145)
SOURCE: NORMAL
WBC # BLD: 16.7 K/UL (ref 4–11)

## 2020-05-06 PROCEDURE — 80048 BASIC METABOLIC PNL TOTAL CA: CPT

## 2020-05-06 PROCEDURE — 83605 ASSAY OF LACTIC ACID: CPT

## 2020-05-06 PROCEDURE — 36415 COLL VENOUS BLD VENIPUNCTURE: CPT

## 2020-05-06 PROCEDURE — 6360000002 HC RX W HCPCS: Performed by: EMERGENCY MEDICINE

## 2020-05-06 PROCEDURE — 2580000003 HC RX 258: Performed by: EMERGENCY MEDICINE

## 2020-05-06 PROCEDURE — 6360000004 HC RX CONTRAST MEDICATION: Performed by: EMERGENCY MEDICINE

## 2020-05-06 PROCEDURE — 70491 CT SOFT TISSUE NECK W/DYE: CPT

## 2020-05-06 PROCEDURE — 84703 CHORIONIC GONADOTROPIN ASSAY: CPT

## 2020-05-06 PROCEDURE — 99283 EMERGENCY DEPT VISIT LOW MDM: CPT

## 2020-05-06 PROCEDURE — 2500000003 HC RX 250 WO HCPCS: Performed by: EMERGENCY MEDICINE

## 2020-05-06 PROCEDURE — 96375 TX/PRO/DX INJ NEW DRUG ADDON: CPT

## 2020-05-06 PROCEDURE — 96365 THER/PROPH/DIAG IV INF INIT: CPT

## 2020-05-06 PROCEDURE — 85025 COMPLETE CBC W/AUTO DIFF WBC: CPT

## 2020-05-06 RX ORDER — KETOROLAC TROMETHAMINE 30 MG/ML
15 INJECTION, SOLUTION INTRAMUSCULAR; INTRAVENOUS ONCE
Status: COMPLETED | OUTPATIENT
Start: 2020-05-06 | End: 2020-05-06

## 2020-05-06 RX ORDER — NAPROXEN 250 MG/1
250 TABLET ORAL 2 TIMES DAILY WITH MEALS
Qty: 30 TABLET | Refills: 0 | Status: SHIPPED | OUTPATIENT
Start: 2020-05-06 | End: 2020-10-14 | Stop reason: ALTCHOICE

## 2020-05-06 RX ORDER — 0.9 % SODIUM CHLORIDE 0.9 %
1000 INTRAVENOUS SOLUTION INTRAVENOUS ONCE
Status: COMPLETED | OUTPATIENT
Start: 2020-05-06 | End: 2020-05-06

## 2020-05-06 RX ORDER — PREDNISONE 20 MG/1
40 TABLET ORAL DAILY
Qty: 8 TABLET | Refills: 0 | Status: SHIPPED | OUTPATIENT
Start: 2020-05-06 | End: 2020-05-10

## 2020-05-06 RX ORDER — CLINDAMYCIN HYDROCHLORIDE 150 MG/1
450 CAPSULE ORAL 3 TIMES DAILY
Qty: 63 CAPSULE | Refills: 0 | Status: SHIPPED | OUTPATIENT
Start: 2020-05-06 | End: 2020-05-13

## 2020-05-06 RX ORDER — CLINDAMYCIN PHOSPHATE 600 MG/50ML
600 INJECTION INTRAVENOUS ONCE
Status: COMPLETED | OUTPATIENT
Start: 2020-05-06 | End: 2020-05-06

## 2020-05-06 RX ORDER — ONDANSETRON 2 MG/ML
4 INJECTION INTRAMUSCULAR; INTRAVENOUS ONCE
Status: COMPLETED | OUTPATIENT
Start: 2020-05-06 | End: 2020-05-06

## 2020-05-06 RX ORDER — DEXAMETHASONE SODIUM PHOSPHATE 10 MG/ML
10 INJECTION, SOLUTION INTRAMUSCULAR; INTRAVENOUS ONCE
Status: COMPLETED | OUTPATIENT
Start: 2020-05-06 | End: 2020-05-06

## 2020-05-06 RX ADMIN — ONDANSETRON 4 MG: 2 INJECTION INTRAMUSCULAR; INTRAVENOUS at 04:14

## 2020-05-06 RX ADMIN — DEXAMETHASONE SODIUM PHOSPHATE 10 MG: 10 INJECTION, SOLUTION INTRAMUSCULAR; INTRAVENOUS at 04:14

## 2020-05-06 RX ADMIN — CLINDAMYCIN PHOSPHATE 600 MG: 600 INJECTION, SOLUTION INTRAVENOUS at 04:12

## 2020-05-06 RX ADMIN — KETOROLAC TROMETHAMINE 15 MG: 30 INJECTION, SOLUTION INTRAMUSCULAR at 04:57

## 2020-05-06 RX ADMIN — IOPAMIDOL 75 ML: 755 INJECTION, SOLUTION INTRAVENOUS at 04:43

## 2020-05-06 RX ADMIN — SODIUM CHLORIDE 1000 ML: 9 INJECTION, SOLUTION INTRAVENOUS at 04:12

## 2020-05-06 ASSESSMENT — PAIN SCALES - GENERAL
PAINLEVEL_OUTOF10: 10
PAINLEVEL_OUTOF10: 10
PAINLEVEL_OUTOF10: 7

## 2020-05-06 ASSESSMENT — PAIN DESCRIPTION - FREQUENCY: FREQUENCY: CONTINUOUS

## 2020-05-06 ASSESSMENT — PAIN DESCRIPTION - LOCATION: LOCATION: THROAT

## 2020-05-06 ASSESSMENT — PAIN DESCRIPTION - DESCRIPTORS: DESCRIPTORS: ACHING;SORE

## 2020-05-06 ASSESSMENT — PAIN DESCRIPTION - PROGRESSION: CLINICAL_PROGRESSION: GRADUALLY WORSENING

## 2020-05-06 NOTE — ED PROVIDER NOTES
started smoking about 6 years ago. She has a 1.25 pack-year smoking history. She has never used smokeless tobacco. She reports current alcohol use of about 2.0 standard drinks of alcohol per week. She reports current drug use. Drug: Marijuana. Family history:    Family History   Problem Relation Age of Onset    No Known Problems Mother     Mental Illness Father         PTSD- [employer]    ADHD Brother     No Known Problems Maternal Aunt     No Known Problems Maternal Uncle     No Known Problems Paternal Aunt     No Known Problems Paternal Uncle     Diabetes Maternal Grandmother     Hypertension Maternal Grandmother     Glaucoma Maternal Grandfather     No Known Problems Paternal Grandmother     No Known Problems Paternal Grandfather     No Known Problems Other     Rheum Arthritis Neg Hx     Osteoarthritis Neg Hx     Asthma Neg Hx     Breast Cancer Neg Hx     Cancer Neg Hx     Heart Failure Neg Hx     High Cholesterol Neg Hx     Migraines Neg Hx     Ovarian Cancer Neg Hx     Rashes/Skin Problems Neg Hx     Seizures Neg Hx     Stroke Neg Hx     Thyroid Disease Neg Hx          Exam  ED Triage Vitals [05/06/20 0354]   BP Temp Temp Source Pulse Resp SpO2 Height Weight   (!) 158/87 -- Oral 117 -- 97 % -- (!) 335 lb (152 kg)     Nursing note and vitals reviewed. Constitutional: In no acute distress  HENT:      Head: Normocephalic      Ears: External ears normal.      Nose: Nose normal.     Mouth: Membrane mucosa moist.  Pharyngeal erythema bilaterally, uvula midline, able to open mouth 3 fingers  Eyes: No discharge. Neck: Supple. Trachea midline. Cardiovascular: Regular rate. Warm extremities  Pulmonary/Chest: Effort normal. No respiratory distress. CTAB. Abdominal: Soft. No distension. Nontender  : Deferred  Rectal: Deferred   Musculoskeletal: Moves all extremities. No gross deformity. Neurological: Alert and oriented. Face symmetric. Speech is clear. Skin: Warm and dry.  No pharyngeal erythema and midline uvula. Will obtain CT soft tissue neck with contrast to evaluate for abscess. Will start empirically on clindamycin and Decadron. (EMP MDM)    Patient's heart rate improved with fluids and Toradol. Pain improved significantly. Patient is feeling much better. Heart rate now 100. She is well-appearing and now wishing to go home. Tolerating PO. I do not believe that the patient is septic. She does have leukocytosis which is likely secondary to tonsillitis. No evidence of abscess. Will give course of clindamycin. Recommended she follow-up with ENT for recurrent episodes of tonsillitis  [unfilled]    Final Impression  1. Acute tonsillitis, unspecified etiology        Blood pressure (!) 158/87, pulse 100, temperature 102.4 °F (39.1 °C), temperature source Oral, weight (!) 335 lb (152 kg), SpO2 97 %, not currently breastfeeding. Disposition:  DISPOSITION        Patient Referrals:  No follow-up provider specified. Discharge Medications:  New Prescriptions    No medications on file       Discontinued Medications:  Discontinued Medications    No medications on file       This chart was generated using the [address] [place] [address] dictation system. I created this record but it may contain dictation errors given the limitations of this technology.         Jf Crowder MD  05/06/20 0879

## 2020-05-07 ENCOUNTER — CARE COORDINATION (OUTPATIENT)
Dept: CARE COORDINATION | Age: 30
End: 2020-05-07

## 2020-05-07 NOTE — CARE COORDINATION
Patient's preferred phone number: see chart  Based on Loop alert triggers, patient will be contacted by nurse care manager for worsening symptoms. Pt will be further monitored by COVID Loop Team based on severity of symptoms and risk factors.

## 2020-07-29 ENCOUNTER — OFFICE VISIT (OUTPATIENT)
Dept: INTERNAL MEDICINE CLINIC | Age: 30
End: 2020-07-29

## 2020-07-29 VITALS — BODY MASS INDEX: 55.61 KG/M2 | WEIGHT: 293 LBS | DIASTOLIC BLOOD PRESSURE: 80 MMHG | SYSTOLIC BLOOD PRESSURE: 126 MMHG

## 2020-07-29 PROCEDURE — 99214 OFFICE O/P EST MOD 30 MIN: CPT | Performed by: NURSE PRACTITIONER

## 2020-07-29 RX ORDER — TRAZODONE HYDROCHLORIDE 50 MG/1
50 TABLET ORAL NIGHTLY
Qty: 90 TABLET | Refills: 0 | Status: SHIPPED | OUTPATIENT
Start: 2020-07-29 | End: 2020-10-14

## 2020-07-29 ASSESSMENT — ENCOUNTER SYMPTOMS: RESPIRATORY NEGATIVE: 1

## 2020-07-29 NOTE — PATIENT INSTRUCTIONS
Increase water intake  Start exercise of walking at least twice a week  Take medication as  Directed, call in 10 days to let know how medication is working    If headaches continue, please call

## 2020-07-29 NOTE — LETTER
[address] [address]  Phone: [phone]  Fax: [phone]    KJ Wallaec CNP        July 29, 2020     Patient: Raf Lowry   YOB: 1990   Date of Visit: 7/29/2020       To Whom It May Concern: It is my medical opinion that Bradly Asher will not be able to return to work at this time due to health conditions. Is expected that she will be able to return to work in 2 weeks which will be August 12. At that time if she responds well to medication she can return without restrictions    If you have any questions or concerns, please don't hesitate to call.     Sincerely,        KJ Wallace CNP

## 2020-08-10 ENCOUNTER — TELEPHONE (OUTPATIENT)
Dept: INTERNAL MEDICINE CLINIC | Age: 30
End: 2020-08-10

## 2020-08-10 NOTE — TELEPHONE ENCOUNTER
----- Message from Ania Faria sent at 8/10/2020 10:20 AM EDT -----  Subject: Message to Provider    QUESTIONS  Information for Provider? pt calling to give update on antidepressants   pt said it is helping a little   helped with focus   although she is having dizziness and diarrhea. also still having random   crying spells. ---------------------------------------------------------------------------  --------------  Krishna BILLS  What is the best way for the office to contact you? OK to leave message on   voicemail  Preferred Call Back Phone Number? [phone]  ---------------------------------------------------------------------------  --------------  SCRIPT ANSWERS  Relationship to Patient?  [facility]

## 2020-09-03 ENCOUNTER — TELEPHONE (OUTPATIENT)
Dept: INTERNAL MEDICINE CLINIC | Age: 30
End: 2020-09-03

## 2020-09-03 NOTE — TELEPHONE ENCOUNTER
----- Message from Soraya Corey sent at 9/3/2020 12:47 PM EDT -----  Subject: Message to Provider    QUESTIONS  Information for Provider? pt needs her shot record - wants to know when   she can p/u at your office. when ready call her @ [phone]  ---------------------------------------------------------------------------  --------------  [address]  What is the best way for the office to contact you? OK to leave message on   voicemail  Preferred Call Back Phone Number? [phone]  ---------------------------------------------------------------------------  --------------  SCRIPT ANSWERS  Relationship to Patient?  Self

## 2020-10-14 ENCOUNTER — TELEPHONE (OUTPATIENT)
Dept: PRIMARY CARE CLINIC | Age: 30
End: 2020-10-14

## 2020-10-14 ENCOUNTER — NURSE TRIAGE (OUTPATIENT)
Dept: OTHER | Facility: CLINIC | Age: 30
End: 2020-10-14

## 2020-10-14 ENCOUNTER — VIRTUAL VISIT (OUTPATIENT)
Dept: PRIMARY CARE CLINIC | Age: 30
End: 2020-10-14
Payer: COMMERCIAL

## 2020-10-14 PROCEDURE — 99213 OFFICE O/P EST LOW 20 MIN: CPT | Performed by: NURSE PRACTITIONER

## 2020-10-14 RX ORDER — ONDANSETRON HYDROCHLORIDE 8 MG/1
8 TABLET, FILM COATED ORAL EVERY 8 HOURS PRN
Qty: 20 TABLET | Refills: 1 | Status: SHIPPED | OUTPATIENT
Start: 2020-10-14 | End: 2022-06-15

## 2020-10-14 RX ORDER — IBUPROFEN 200 MG
800 TABLET ORAL EVERY 8 HOURS
COMMUNITY
End: 2020-10-27 | Stop reason: SINTOL

## 2020-10-14 RX ORDER — SUMATRIPTAN 25 MG/1
TABLET, FILM COATED ORAL
Qty: 9 TABLET | Refills: 5 | Status: SHIPPED | OUTPATIENT
Start: 2020-10-14 | End: 2022-06-15

## 2020-10-14 RX ORDER — ONDANSETRON 8 MG/1
8 TABLET, ORALLY DISINTEGRATING ORAL EVERY 8 HOURS PRN
Qty: 20 TABLET | Refills: 1 | Status: SHIPPED | OUTPATIENT
Start: 2020-10-14 | End: 2020-10-14 | Stop reason: SDUPTHER

## 2020-10-14 NOTE — PROGRESS NOTES
10/14/2020    TELEHEALTH EVALUATION -- Audio/Visual (During RVEFU-44 public health emergency)    Chief Complaint   Patient presents with    Migraine     Migraine started yesterday         HPI:    [address] (: 1990) has requested an audio/video evaluation for the following concern(s): Migraine headache that started yesterday has slightly improved with Advil 800 mg. Nausea earlier today upon waking. Pain described as throbbing, located on right side of head that radiates to right eye\". Rates pain 6/10. Denies photophobia, phonophobia. History of migraine headaches in the past, with headaches occurring more frequently than usual reporting migraine every other day. Previous provider was concerned about increased stress level and anxiety as causative factor, and lack of fluid intake (water). Review of Systems:  Gen: Denies fever, chills. No weight loss. Eating and drinking to baseline. HEENT: Denies cold symptoms, sore throat. Denies changes in taste or smell. CV:  Denies chest pain or tightness, palpitations. Pulm: Denies shortness of breath, cough. Abd: Denies nausea, vomiting. Denies abdominal pain, change in bowel habits. Neuro: + Migraine headache. Denies thunderclap headache. Denies \"worst headache of life\"    Current Outpatient Medications on File Prior to Visit   Medication Sig Dispense Refill    ibuprofen (ADVIL;MOTRIN) 200 MG tablet Take 800 mg by mouth every 8 hours       No current facility-administered medications on file prior to visit.          Past Medical History:   Diagnosis Date    Postpartum depression  & 2016    Psychiatric problem     Suicide attempt [facility]) 2015    overdose on depression medication    UTI (urinary tract infection) 2016       Past Surgical History:   Procedure Laterality Date    CHOLECYSTECTOMY      TUBAL LIGATION      TYMPANOSTOMY TUBE PLACEMENT Bilateral     as a child       Family History   Problem Relation Age of Onset    No Known Problems Mother     Mental Illness Father         PTSD- [employer]    ADHD Brother     No Known Problems Maternal Aunt     No Known Problems Maternal Uncle     No Known Problems Paternal Aunt     No Known Problems Paternal Uncle     Diabetes Maternal Grandmother     Hypertension Maternal Grandmother     Glaucoma Maternal Grandfather     No Known Problems Paternal Grandmother     No Known Problems Paternal Grandfather     No Known Problems Other     Rheum Arthritis Neg Hx     Osteoarthritis Neg Hx     Asthma Neg Hx     Breast Cancer Neg Hx     Cancer Neg Hx     Heart Failure Neg Hx     High Cholesterol Neg Hx     Migraines Neg Hx     Ovarian Cancer Neg Hx     Rashes/Skin Problems Neg Hx     Seizures Neg Hx     Stroke Neg Hx     Thyroid Disease Neg Hx        No Known Allergies    Social History     Tobacco Use    Smoking status: Current Every Day Smoker     Packs/day: 0.25     Years: 5.00     Pack years: 1.25     Types: Cigarettes     Start date: 2014    Smokeless tobacco: Never Used    Tobacco comment: cessation 7/16   Substance Use Topics    Alcohol use: Yes     Alcohol/week: 2.0 standard drinks     Types: 2 Glasses of wine per week     Comment: Social    Drug use: Yes     Types: Marijuana     Comment: smokes 15 joints/ week          PHYSICAL EXAMINATION:  Vital Signs: (As obtained by patient/caregiver or practitioner observation)  There were no vitals taken for this visit. Patient-Reported Vitals 10/14/2020   Patient-Reported Weight 320 lb   Patient-Reported Height 5 4   Patient-Reported Temperature (No Data)        Respiratory rate appears normal    Constitutional: Appears well-developed and well-nourished. No apparent distress    Mental status: Alert and awake. Oriented to person/place/time. Able to follow commands    Eyes: EOM normal. Sclera normal. No discharge visible  HENT: Normocephalic, atraumatic.   Neck: No visualized mass   Pulmonary/Chest: Respiratory effort normal.  No visualized signs of difficulty breathing or respiratory distress. Speaking in full sentences without difficulty. Musculoskeletal: Normal range of motion of neck  Neurological: No Facial Asymmetry (Cranial nerve 7 motor function) (limited exam to video visit). No gaze palsy       Skin: No significant exanthematous lesions or discoloration noted on facial skin       Psychiatric: Normal Affect. No Hallucinations            ASSESSMENT/PLAN:    1. Migraine without aura and without status migrainosus, not intractable  - Acute  - Start SUMAtriptan (IMITREX) 25 MG tablet; Take 1 tablet at once of migraine. Repeat dose after two hours if headache relief is partially effective or headache recurs. Dispense: 9 tablet; Refill: 5  - Continue OTC Advil 200 mg, 4 tablets by mouth every 8 hours PRN migraine headache. Take with food. - Drink at least 64 ounces of water a day. 2. Nausea  - Acute, secondary to migraine headache  - Start ondansetron (ZOFRAN-ODT) 8 MG tablet; Take 1 tablet by mouth every 8 hours as needed for Nausea  Dispense: 20 tablet; Refill: 1      Return if symptoms worsen or fail to improve. Current Outpatient Medications   Medication Sig Dispense Refill    ibuprofen (ADVIL;MOTRIN) 200 MG tablet Take 800 mg by mouth every 8 hours      SUMAtriptan (IMITREX) 25 MG tablet Take 1 tablet at once of migraine. Repeat dose after two hours if headache relief is partially effective or headache recurs. 9 tablet 5    ondansetron (ZOFRAN) 8 MG tablet Take 1 tablet by mouth every 8 hours as needed for Nausea 20 tablet 1     No current facility-administered medications for this visit. Vivi Watters is a 35 y.o. female being evaluated by a Virtual Visit (video visit) encounter to address concerns as mentioned above. A caregiver was present when appropriate.  Due to this being a TeleHealth encounter (During OBKVC-42 public health emergency), evaluation of the following organ systems was limited: Vitals/Constitutional/EENT/Resp/CV/GI//MS/Neuro/Skin/Heme-Lymph-Imm. Pursuant to the emergency declaration under the [address] [place] [address], [address] [place] [address] and the [employer] Act, this Virtual Visit was conducted with patient's (and/or legal guardian's) consent, to reduce the patient's risk of exposure to COVID-19 and provide necessary medical care. The patient (and/or legal guardian) has also been advised to contact this office for worsening conditions or problems, and seek emergency medical treatment and/or call 911 if deemed necessary. Patient identification was verified at the start of the visit: Yes    Total time spent on this encounter: 15 minutes. Services were provided through a video synchronous discussion virtually to substitute for in-person clinic visit. Patient was located in their home. Provider was located in the office. --KJ Burr - CNP on 10/14/2020 at 3:07 PM    An electronic signature was used to authenticate this note. Mimi Young

## 2020-10-14 NOTE — PATIENT INSTRUCTIONS
Patient Education        Migraine Headache: Care Instructions  Your Care Instructions     Migraines are painful, throbbing headaches that often start on one side of the head. They may cause nausea and vomiting and make you sensitive to light, sound, or smell. Without treatment, migraines can last from 4 hours to a few days. Medicines can help prevent migraines or stop them after they have started. Your doctor can help you find which ones work best for you. Follow-up care is a key part of your treatment and safety. Be sure to make and go to all appointments, and call your doctor if you are having problems. It's also a good idea to know your test results and keep a list of the medicines you take. How can you care for yourself at home? · Do not drive if you have taken a prescription pain medicine. · Rest in a quiet, dark room until your headache is gone. Close your eyes, and try to relax or go to sleep. Don't watch TV or read. · Put a cold, moist cloth or cold pack on the painful area for 10 to 20 minutes at a time. Put a thin cloth between the cold pack and your skin. · Use a warm, moist towel or a heating pad set on low to relax tight shoulder and neck muscles. · Have someone gently massage your neck and shoulders. · Take your medicines exactly as prescribed. Call your doctor if you think you are having a problem with your medicine. You will get more details on the specific medicines your doctor prescribes. · Don't take medicine for headache pain too often. Talk to your doctor if you are taking medicine more than 2 days a week to stop a headache. Taking too much pain medicine can lead to more headaches. These are called medicine-overuse headaches. To prevent migraines  · Keep a headache diary so you can figure out what triggers your headaches. Avoiding triggers may help you prevent headaches. Record when each headache began, how long it lasted, and what the pain was like.  Write down any other symptoms you had with the headache, such as nausea, flashing lights or dark spots, or sensitivity to bright light or loud noise. Note if the headache occurred near your period. List anything that might have triggered the headache. Triggers may include certain foods (chocolate, cheese, wine) or odors, smoke, bright light, stress, or lack of sleep. · If your doctor has prescribed medicine for your migraines, take it as directed. You may have medicine that you take only when you get a migraine and medicine that you take all the time to help prevent migraines. ? If your doctor has prescribed medicine for when you get a headache, take it at the first sign of a migraine, unless your doctor has given you other instructions. ? If your doctor has prescribed medicine to prevent migraines, take it exactly as prescribed. Call your doctor if you think you are having a problem with your medicine. · Find healthy ways to deal with stress. Migraines are most common during or right after stressful times. Try finding ways to reduce stress like practicing mindfulness or deep breathing exercises. · Get plenty of sleep and exercise. But be careful to not push yourself too hard during exercise. It may trigger a headache. · Eat meals on a regular schedule. Avoid foods and drinks that often trigger migraines. These include chocolate, alcohol (especially red wine and port), aspartame, monosodium glutamate (MSG), and some additives found in foods (such as hot dogs, ren, cold cuts, aged cheeses, and pickled foods). · Limit caffeine. Don't drink too much coffee, tea, or soda. But don't quit caffeine suddenly. That can also give you migraines. · Do not smoke or allow others to smoke around you. If you need help quitting, talk to your doctor about stop-smoking programs and medicines. These can increase your chances of quitting for good.   · If you are taking birth control pills or hormone therapy, talk to your doctor about whether they are triggering your migraines. When should you call for help? Call 911 anytime you think you may need emergency care. For example, call if:    · You have signs of a stroke. These may include:  ? Sudden numbness, paralysis, or weakness in your face, arm, or leg, especially on only one side of your body. ? Sudden vision changes. ? Sudden trouble speaking. ? Sudden confusion or trouble understanding simple statements. ? Sudden problems with walking or balance. ? A sudden, severe headache that is different from past headaches. Call your doctor now or seek immediate medical care if:    · You have new or worse nausea and vomiting.     · You have a new or higher fever.     · Your headache gets much worse. Watch closely for changes in your health, and be sure to contact your doctor if:    · You are not getting better after 2 days (48 hours). Where can you learn more? Go to https://UC CEINpepiceweb.Intelliden. org and sign in to your Cogbooks account. Enter J524 in the MoboTap box to learn more about \"Migraine Headache: Care Instructions. \"     If you do not have an account, please click on the \"Sign Up Now\" link. Current as of: November 20, 2019               Content Version: 12.6  © 4416-2012 [employer]. Care instructions adapted under license by [facility] [address] If you have questions about a medical condition or this instruction, always ask your healthcare professional. [place] [address] any warranty or liability for your use of this information. Patient Education        Recurring Migraine Headache: Care Instructions  Your Care Instructions  Migraines are painful, throbbing headaches. They often start on one side of the head. They may cause nausea and vomiting and make you sensitive to light, sound, or smell. Some people may have only a few migraines throughout life. Others have them as often as several times a month.   The goal of treatment is to reduce the number of migraines you have and relieve your symptoms. Even with treatment, you may continue to have migraines. You play an important role in dealing with your headaches. Work on avoiding things that seem to trigger your migraines. When you feel a headache coming on, act quickly to stop it before it gets worse. Follow-up care is a key part of your treatment and safety. Be sure to make and go to all appointments, and call your doctor if you are having problems. It's also a good idea to know your test results and keep a list of the medicines you take. How can you care for yourself at home? · Do not drive if you have taken a prescription pain medicine. · Rest in a quiet, dark room until your headache is gone. Close your eyes and try to relax or go to sleep. Do not watch TV or read. · Put a cold, moist cloth or cold pack on the painful area for 10 to 20 minutes at a time. Put a thin cloth between the cold pack and your skin. · Have someone gently massage your neck and shoulders. · Take your medicines exactly as prescribed. Call your doctor if you think you are having a problem with your medicine. You will get more details on the specific medicines your doctor prescribes. · Don't take medicine for headache pain too often. Talk to your doctor if you are taking medicine more than 2 days a week to stop a headache. Taking too much pain medicine can lead to more headaches. These are called medicine-overuse headaches. To prevent migraines  · Keep a headache diary so you can figure out what triggers your headaches. Avoiding triggers may help you prevent headaches. Record when each headache began, how long it lasted, and what the pain was like. Write down any other symptoms you had with the headache. These may include nausea, flashing lights or dark spots, or sensitivity to bright light or loud noise. Note if the headache occurred near your period. List anything that might have triggered the headache.  Triggers may include certain foods (chocolate, cheese, wine) or odors, smoke, bright light, stress, or lack of sleep. · If your doctor has prescribed medicine for your migraines, take it as directed. You may have medicine that you take only when you get a migraine and medicine that you take all the time to help prevent migraines. ? If your doctor has prescribed medicine for when you get a headache, take it at the first sign of a migraine, unless your doctor has given you other instructions. ? If your doctor has prescribed medicine to prevent migraines, take it exactly as prescribed. Call your doctor if you think you are having a problem with your medicine. · Find healthy ways to deal with stress. Migraines are most common during or right after stressful times. Try finding ways to reduce stress like practicing mindfulness or deep breathing exercises. · Get regular sleep and exercise. But be careful to not push yourself too hard during exercise. It may trigger a headache. · Eat regular meals, and avoid foods and drinks that often trigger migraines. These include chocolate and alcohol, especially red wine and port. Chemicals used in food, such as aspartame and monosodium glutamate (MSG), also can trigger migraines. So can some food additives, such as those found in hot dogs, ren, cold cuts, aged cheeses, and pickled foods. · Limit caffeine by not drinking too much coffee, tea, or soda. Do not quit caffeine suddenly, because that can also give you migraines. · Do not smoke or allow others to smoke around you. If you need help quitting, talk to your doctor about stop-smoking programs and medicines. These can increase your chances of quitting for good. · If you are taking birth control pills or hormone therapy, talk to your doctor about whether they are triggering your migraines. When should you call for help? Call 911 anytime you think you may need emergency care.  For example, call if:    · You have symptoms of a stroke. These may include:  ? Sudden numbness, tingling, weakness, or loss of movement in your face, arm, or leg, especially on only one side of your body. ? Sudden vision changes. ? Sudden trouble speaking. ? Sudden confusion or trouble understanding simple statements. ? Sudden problems with walking or balance. ? A sudden, severe headache that is different from past headaches. Call your doctor now or seek immediate medical care if:    · You develop a fever and a stiff neck.     · You have new nausea and vomiting, or you cannot keep down food or liquids. Watch closely for changes in your health, and be sure to contact your doctor if:    · You have a headache that does not get better within 1 or 2 days.     · Your headaches get worse or happen more often. Where can you learn more? Go to https://RadioShackpeDacos Software.Fresco Microchip. org and sign in to your Rooster Teeth account. Enter  in the Yododo box to learn more about \"Recurring Migraine Headache: Care Instructions. \"     If you do not have an account, please click on the \"Sign Up Now\" link. Current as of: November 20, 2019               Content Version: 12.6  © 0024-8092 [employer]. Care instructions adapted under license by [facility] ([facility]). If you have questions about a medical condition or this instruction, always ask your healthcare professional. [address] any warranty or liability for your use of this information. Patient Education        Nausea and Vomiting: Care Instructions  Your Care Instructions     When you are nauseated, you may feel weak and sweaty and notice a lot of saliva in your mouth. Nausea often leads to vomiting. Most of the time you do not need to worry about nausea and vomiting, but they can be signs of other illnesses. Two common causes of nausea and vomiting are stomach flu and food poisoning.  Nausea and vomiting from viral stomach flu will usually start to improve within 24 hours. Nausea and vomiting from food poisoning may last from 12 to 48 hours. The doctor has checked you carefully, but problems can develop later. If you notice any problems or new symptoms, get medical treatment right away. Follow-up care is a key part of your treatment and safety. Be sure to make and go to all appointments, and call your doctor if you are having problems. It's also a good idea to know your test results and keep a list of the medicines you take. How can you care for yourself at home? · To prevent dehydration, drink plenty of fluids, enough so that your urine is light yellow or clear like water. Choose water and other caffeine-free clear liquids until you feel better. If you have kidney, heart, or liver disease and have to limit fluids, talk with your doctor before you increase the amount of fluids you drink. · Rest in bed until you feel better. · When you are able to eat, try clear soups, mild foods, and liquids until all symptoms are gone for 12 to 48 hours. Other good choices include dry toast, crackers, cooked cereal, and gelatin dessert, such as Jell-O. When should you call for help? Call 911 anytime you think you may need emergency care. For example, call if:    · You passed out (lost consciousness). Call your doctor now or seek immediate medical care if:    · You have symptoms of dehydration, such as:  ? Dry eyes and a dry mouth. ? Passing only a little dark urine. ? Feeling thirstier than usual.     · You have new or worsening belly pain.     · You have a new or higher fever.     · You vomit blood or what looks like coffee grounds. Watch closely for changes in your health, and be sure to contact your doctor if:    · You have ongoing nausea and vomiting.     · Your vomiting is getting worse.     · Your vomiting lasts longer than 2 days.     · You are not getting better as expected. Where can you learn more? Go to https://chsandraeb.health-[employer] Media. org and sign in to your

## 2020-10-14 NOTE — TELEPHONE ENCOUNTER
I spoke with Mary Roa and she would like a doctor's note for today sent to her My chart for her VV with Swapnil blanton.

## 2020-10-14 NOTE — TELEPHONE ENCOUNTER
scheduling. Attention Provider: Thank you for allowing me to participate in the care of your patient. The patient was connected to triage in response to information provided to the [facility]. Please do not respond through this encounter as the response is not directed to a shared pool.

## 2020-10-26 RX ORDER — TRAZODONE HYDROCHLORIDE 50 MG/1
TABLET ORAL
Qty: 90 TABLET | Refills: 0 | Status: SHIPPED | OUTPATIENT
Start: 2020-10-26 | End: 2020-11-10

## 2020-10-27 ENCOUNTER — OFFICE VISIT (OUTPATIENT)
Dept: INTERNAL MEDICINE CLINIC | Age: 30
End: 2020-10-27
Payer: COMMERCIAL

## 2020-10-27 VITALS — SYSTOLIC BLOOD PRESSURE: 118 MMHG | BODY MASS INDEX: 54.93 KG/M2 | DIASTOLIC BLOOD PRESSURE: 80 MMHG | WEIGHT: 293 LBS

## 2020-10-27 PROCEDURE — 4004F PT TOBACCO SCREEN RCVD TLK: CPT | Performed by: NURSE PRACTITIONER

## 2020-10-27 PROCEDURE — G8482 FLU IMMUNIZE ORDER/ADMIN: HCPCS | Performed by: NURSE PRACTITIONER

## 2020-10-27 PROCEDURE — 90686 IIV4 VACC NO PRSV 0.5 ML IM: CPT | Performed by: NURSE PRACTITIONER

## 2020-10-27 PROCEDURE — 90471 IMMUNIZATION ADMIN: CPT | Performed by: NURSE PRACTITIONER

## 2020-10-27 PROCEDURE — G8417 CALC BMI ABV UP PARAM F/U: HCPCS | Performed by: NURSE PRACTITIONER

## 2020-10-27 PROCEDURE — G8427 DOCREV CUR MEDS BY ELIG CLIN: HCPCS | Performed by: NURSE PRACTITIONER

## 2020-10-27 PROCEDURE — 99213 OFFICE O/P EST LOW 20 MIN: CPT | Performed by: NURSE PRACTITIONER

## 2020-10-27 RX ORDER — VILAZODONE HYDROCHLORIDE 20 MG/1
20 TABLET ORAL DAILY
Qty: 30 TABLET | Refills: 1 | Status: SHIPPED | OUTPATIENT
Start: 2020-10-27 | End: 2020-10-27

## 2020-10-27 RX ORDER — VILAZODONE HYDROCHLORIDE 40 MG/1
40 TABLET ORAL DAILY
Qty: 30 TABLET | Refills: 3 | Status: SHIPPED | OUTPATIENT
Start: 2020-10-27 | End: 2020-11-10 | Stop reason: SDUPTHER

## 2020-10-27 RX ORDER — VILAZODONE HYDROCHLORIDE 40 MG/1
40 TABLET ORAL DAILY
Qty: 35 TABLET | Refills: 0 | COMMUNITY
Start: 2020-10-27 | End: 2020-11-10 | Stop reason: ALTCHOICE

## 2020-10-27 ASSESSMENT — PATIENT HEALTH QUESTIONNAIRE - PHQ9
1. LITTLE INTEREST OR PLEASURE IN DOING THINGS: 0
SUM OF ALL RESPONSES TO PHQ QUESTIONS 1-9: 1
SUM OF ALL RESPONSES TO PHQ9 QUESTIONS 1 & 2: 1
SUM OF ALL RESPONSES TO PHQ QUESTIONS 1-9: 1
SUM OF ALL RESPONSES TO PHQ QUESTIONS 1-9: 1
2. FEELING DOWN, DEPRESSED OR HOPELESS: 1

## 2020-10-27 ASSESSMENT — ENCOUNTER SYMPTOMS
EYES NEGATIVE: 1
GASTROINTESTINAL NEGATIVE: 1
RESPIRATORY NEGATIVE: 1

## 2020-11-03 PROBLEM — F32.A DEPRESSION: Status: RESOLVED | Noted: 2020-11-03 | Resolved: 2020-11-03

## 2020-11-10 ENCOUNTER — OFFICE VISIT (OUTPATIENT)
Dept: PSYCHIATRY | Age: 30
End: 2020-11-10
Payer: COMMERCIAL

## 2020-11-10 VITALS
HEART RATE: 82 BPM | WEIGHT: 293 LBS | TEMPERATURE: 97.9 F | DIASTOLIC BLOOD PRESSURE: 72 MMHG | HEIGHT: 64 IN | SYSTOLIC BLOOD PRESSURE: 122 MMHG | BODY MASS INDEX: 50.02 KG/M2 | OXYGEN SATURATION: 99 %

## 2020-11-10 PROBLEM — F33.1 MODERATE EPISODE OF RECURRENT MAJOR DEPRESSIVE DISORDER (HCC): Status: ACTIVE | Noted: 2020-11-10

## 2020-11-10 PROCEDURE — 90792 PSYCH DIAG EVAL W/MED SRVCS: CPT | Performed by: PSYCHIATRY & NEUROLOGY

## 2020-11-10 PROCEDURE — 4004F PT TOBACCO SCREEN RCVD TLK: CPT | Performed by: PSYCHIATRY & NEUROLOGY

## 2020-11-10 RX ORDER — TRAZODONE HYDROCHLORIDE 50 MG/1
TABLET ORAL
Qty: 60 TABLET | Refills: 1 | Status: SHIPPED | OUTPATIENT
Start: 2020-11-10 | End: 2021-06-17 | Stop reason: SDUPTHER

## 2020-11-10 RX ORDER — VENLAFAXINE HYDROCHLORIDE 75 MG/1
75 TABLET, EXTENDED RELEASE ORAL DAILY
Qty: 30 TABLET | Refills: 1 | Status: SHIPPED | OUTPATIENT
Start: 2020-11-10 | End: 2021-06-17 | Stop reason: SDUPTHER

## 2020-11-10 RX ORDER — VENLAFAXINE HYDROCHLORIDE 37.5 MG/1
37.5 CAPSULE, EXTENDED RELEASE ORAL DAILY
Qty: 7 CAPSULE | Refills: 0 | Status: SHIPPED | OUTPATIENT
Start: 2020-11-10 | End: 2021-06-17
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
## 2020-11-12 ENCOUNTER — OFFICE VISIT (OUTPATIENT)
Dept: PSYCHOLOGY | Age: 30
End: 2020-11-12
Payer: COMMERCIAL

## 2020-11-12 PROCEDURE — 90791 PSYCH DIAGNOSTIC EVALUATION: CPT | Performed by: PSYCHOLOGIST

## 2020-11-12 NOTE — PATIENT INSTRUCTIONS
should be the way I expect them to be. \"    5. Low Frustration Tolerance. \"I should never be frustrated. Life should be easy. \"    6. Performance Perfectionism. \"I must never fail or make a mistake. \"    7. Perceived Perfectionism. \"People will not love and accept me as a flawed and vulnerable human being. \"    8. Fear of Failure. \"My worthwhileness depends on my achievements (or my intelligence, or status, or attractiveness). \"    9. Fear of Disapproval or Criticism. \"I need everybody's approval to be worthwhile. \"    10. Fear of Rejection or Being Alone. \"If I'm alone, then I'm bound to feel miserable and unfulfilled. If I'm not loved, then life is not worth living. \"    MINDFULNESS    Mindfulness means paying attention in a particular way: on purpose, in the present moment, and non-judgmentally. Cecy Shea    \"Mindfulness is the basic human ability to be fully present, aware of where we are and what were doing, and not overly reactive or overwhelmed by whats going on around us. \"   -Mindful [place]    Paying attention on purpose  Mindfulness involves paying attention on purpose. Mindfulness involves a conscious direction of our awareness. This can mean purposefully directing our attention to the breath, or a particular emotion, or an activity as simple as eating. Doing so allows us to actively shape the mind. Paying attention in the present moment  Left to itself, the mind wanders through all kinds of thoughts -- including thoughts expressing anger, craving, depression, self-pity, and anxiety. As we indulge in these kinds of thoughts, we reinforce those emotions and cause ourselves to suffer. These thoughts usually center around the past or future. But the past no longer exists. The future is just a fantasy until it happens. The one moment we actually can experience -- the present moment -- is the one we seem most to avoid.  By purposefully directing our awareness away from thoughts about the past or future and instead towards the [address] - our present moment experience - we decrease the effect of these thoughts on our lives. Paying attention non-judgmentally  Mindfulness is an emotionally non-reactive state. We don't  that this experience is good and that one is bad. Or, if we do make those judgments, we dont get upset in reaction to our experience. We simply notice it arising, observe it mindfully, and allow it to pass through us. When practicing mindfulness, we may be aware that certain experiences are pleasant and some are unpleasant, but on an emotional level we dont react. Resources  · \"Wherever You Go, There You Are: Mindfulness Meditation in Everyday Life\" - by Ozzie Smith  · \"The Miracle of Mindfulness:  An Introduction to the Practice of Meditation\" - by Crystal Thompson  · \"The Mindfulness Solution: Everyday Practices for Everyday Problems\" - by Barbie Sweeney    Ways to Practice Mindfulness  · Pay attention to your breathing  · Take a mindful walk  · Eat mindfully  · Ground yourself in your five senses  · Journal  · Try dishwashing, cleaning and doing laundry a little bit slower than you usually do  · Meditate
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
## 2021-01-12 ENCOUNTER — HOSPITAL ENCOUNTER (EMERGENCY)
Age: 31
Discharge: HOME | End: 2021-01-12
Payer: COMMERCIAL

## 2021-01-12 ENCOUNTER — NURSE TRIAGE (OUTPATIENT)
Dept: OTHER | Facility: CLINIC | Age: 31
End: 2021-01-12

## 2021-01-12 VITALS
OXYGEN SATURATION: 98 % | RESPIRATION RATE: 18 BRPM | HEART RATE: 84 BPM | BODY MASS INDEX: 60.08 KG/M2 | SYSTOLIC BLOOD PRESSURE: 126 MMHG | DIASTOLIC BLOOD PRESSURE: 63 MMHG | WEIGHT: 293 LBS | TEMPERATURE: 98.8 F

## 2021-01-12 DIAGNOSIS — R10.13 ABDOMINAL PAIN, EPIGASTRIC: Primary | ICD-10-CM

## 2021-01-12 DIAGNOSIS — N30.00 ACUTE CYSTITIS WITHOUT HEMATURIA: ICD-10-CM

## 2021-01-12 LAB
A/G RATIO: 0.9 (ref 1.1–2.2)
ALBUMIN SERPL-MCNC: 3.5 G/DL (ref 3.4–5)
ALP BLD-CCNC: 71 U/L (ref 40–129)
ALT SERPL-CCNC: 12 U/L (ref 10–40)
ANION GAP SERPL CALCULATED.3IONS-SCNC: 9 MMOL/L (ref 3–16)
AST SERPL-CCNC: 13 U/L (ref 15–37)
BACTERIA: ABNORMAL /HPF
BASOPHILS ABSOLUTE: 0 K/UL (ref 0–0.2)
BASOPHILS RELATIVE PERCENT: 0.2 %
BILIRUB SERPL-MCNC: 0.4 MG/DL (ref 0–1)
BILIRUBIN URINE: NEGATIVE
BLOOD, URINE: NEGATIVE
BUN BLDV-MCNC: 11 MG/DL (ref 7–20)
CALCIUM SERPL-MCNC: 8.8 MG/DL (ref 8.3–10.6)
CHLORIDE BLD-SCNC: 105 MMOL/L (ref 99–110)
CLARITY: ABNORMAL
CO2: 22 MMOL/L (ref 21–32)
COLOR: YELLOW
CREAT SERPL-MCNC: <0.5 MG/DL (ref 0.6–1.1)
EOSINOPHILS ABSOLUTE: 0.1 K/UL (ref 0–0.6)
EOSINOPHILS RELATIVE PERCENT: 1 %
EPITHELIAL CELLS, UA: 3 /HPF (ref 0–5)
GFR AFRICAN AMERICAN: >60
GFR NON-AFRICAN AMERICAN: >60
GLOBULIN: 3.8 G/DL
GLUCOSE BLD-MCNC: 86 MG/DL (ref 70–99)
GLUCOSE URINE: NEGATIVE MG/DL
HCG QUALITATIVE: NEGATIVE
HCT VFR BLD CALC: 41.6 % (ref 36–48)
HEMOGLOBIN: 13.6 G/DL (ref 12–16)
HYALINE CASTS: 2 /LPF (ref 0–8)
KETONES, URINE: NEGATIVE MG/DL
LEUKOCYTE ESTERASE, URINE: ABNORMAL
LIPASE: 18 U/L (ref 13–60)
LYMPHOCYTES ABSOLUTE: 0.8 K/UL (ref 1–5.1)
LYMPHOCYTES RELATIVE PERCENT: 11.7 %
MCH RBC QN AUTO: 32.1 PG (ref 26–34)
MCHC RBC AUTO-ENTMCNC: 32.6 G/DL (ref 31–36)
MCV RBC AUTO: 98.4 FL (ref 80–100)
MICROSCOPIC EXAMINATION: YES
MONOCYTES ABSOLUTE: 0.4 K/UL (ref 0–1.3)
MONOCYTES RELATIVE PERCENT: 5.5 %
NEUTROPHILS ABSOLUTE: 5.9 K/UL (ref 1.7–7.7)
NEUTROPHILS RELATIVE PERCENT: 81.6 %
NITRITE, URINE: NEGATIVE
PDW BLD-RTO: 13.9 % (ref 12.4–15.4)
PH UA: 7 (ref 5–8)
PLATELET # BLD: 210 K/UL (ref 135–450)
PMV BLD AUTO: 8.9 FL (ref 5–10.5)
POTASSIUM SERPL-SCNC: 3.9 MMOL/L (ref 3.5–5.1)
PROTEIN UA: NEGATIVE MG/DL
RBC # BLD: 4.23 M/UL (ref 4–5.2)
RBC UA: 3 /HPF (ref 0–4)
SODIUM BLD-SCNC: 136 MMOL/L (ref 136–145)
SPECIFIC GRAVITY UA: 1.02 (ref 1–1.03)
TOTAL PROTEIN: 7.3 G/DL (ref 6.4–8.2)
URINE REFLEX TO CULTURE: YES
URINE TYPE: ABNORMAL
UROBILINOGEN, URINE: 0.2 E.U./DL
WBC # BLD: 7.2 K/UL (ref 4–11)
WBC UA: 31 /HPF (ref 0–5)

## 2021-01-12 PROCEDURE — 81001 URINALYSIS AUTO W/SCOPE: CPT

## 2021-01-12 PROCEDURE — 87086 URINE CULTURE/COLONY COUNT: CPT

## 2021-01-12 PROCEDURE — 85025 COMPLETE CBC W/AUTO DIFF WBC: CPT

## 2021-01-12 PROCEDURE — 80053 COMPREHEN METABOLIC PANEL: CPT

## 2021-01-12 PROCEDURE — 84703 CHORIONIC GONADOTROPIN ASSAY: CPT

## 2021-01-12 PROCEDURE — 83690 ASSAY OF LIPASE: CPT

## 2021-01-12 PROCEDURE — 6370000000 HC RX 637 (ALT 250 FOR IP): Performed by: PHYSICIAN ASSISTANT

## 2021-01-12 PROCEDURE — 99283 EMERGENCY DEPT VISIT LOW MDM: CPT

## 2021-01-12 RX ORDER — CEPHALEXIN 500 MG/1
500 CAPSULE ORAL 2 TIMES DAILY
Qty: 14 CAPSULE | Refills: 0 | Status: SHIPPED | OUTPATIENT
Start: 2021-01-12 | End: 2021-01-19

## 2021-01-12 RX ORDER — ONDANSETRON 4 MG/1
4 TABLET, ORALLY DISINTEGRATING ORAL EVERY 8 HOURS PRN
Qty: 20 TABLET | Refills: 0 | Status: SHIPPED | OUTPATIENT
Start: 2021-01-12 | End: 2021-04-22 | Stop reason: SDUPTHER

## 2021-01-12 RX ORDER — SUCRALFATE ORAL 1 G/10ML
1 SUSPENSION ORAL 4 TIMES DAILY
Qty: 1200 ML | Refills: 0 | Status: SHIPPED | OUTPATIENT
Start: 2021-01-12 | End: 2021-06-17 | Stop reason: ALTCHOICE

## 2021-01-12 RX ORDER — OMEPRAZOLE 20 MG/1
20 CAPSULE, DELAYED RELEASE ORAL
Qty: 60 CAPSULE | Refills: 0 | Status: SHIPPED | OUTPATIENT
Start: 2021-01-12 | End: 2021-04-22 | Stop reason: SDUPTHER

## 2021-01-12 RX ORDER — FAMOTIDINE 20 MG/1
20 TABLET, FILM COATED ORAL 2 TIMES DAILY
Qty: 60 TABLET | Refills: 0 | Status: SHIPPED | OUTPATIENT
Start: 2021-01-12 | End: 2022-06-15

## 2021-01-12 RX ADMIN — LIDOCAINE HYDROCHLORIDE: 20 SOLUTION ORAL; TOPICAL at 13:01

## 2021-01-12 ASSESSMENT — ENCOUNTER SYMPTOMS
VOMITING: 0
COUGH: 0
RHINORRHEA: 0
DIARRHEA: 0
SHORTNESS OF BREATH: 0
ABDOMINAL PAIN: 1
NAUSEA: 1

## 2021-01-12 NOTE — ED NOTES
Discharge and education instructions reviewed. Patient verbalized understanding, teach-back successful. Patient denied questions at this time. No acute distress noted. Patient instructed to follow-up as noted - return to emergency department if symptoms worsen. Patient verbalized understanding. Discharged per EDMD with discharged instructions.        Jannette Benson RN  01/12/21 [id number]

## 2021-01-12 NOTE — ED PROVIDER NOTES
[address] [facility]        Pt Name: Suyapa Hernandez  MRN: [id number]  [place] 1990  Date of evaluation: 1/12/2021  Provider: Toby Cortes PA-C  PCP: KJ Eubanks - CNP    SAW. I have evaluated this patient. My supervising physician was available for consultation. CHIEF COMPLAINT       Chief Complaint   Patient presents with    Abdominal Pain     c/o bilateral upper abdominal \"cramping\" pain since early in the morning \"pain woke me up\" with nausea. Denies diarrhea or fever       HISTORY OF PRESENT ILLNESS   (Location, Timing/Onset, Context/Setting, Quality, Duration, Modifying Factors, Severity, Associated Signs and Symptoms)  Note limiting factors. Suyapa Hernandez is a 35 y.o. female who presents to the emergency department today for evaluation for abdominal pain. The patient states that earlier this morning, she began to experience a cramping squeezing sensation to her upper abdomen. She states that the pain seems to wax and wane on its own, and when I evaluate the patient he states that she actually does not have any pain at this time. The patient states that she will have associated nausea when the pain comes on time but otherwise does not have any nausea, vomiting or diarrhea at this time. The patient does not have any chest pain. No shortness of breath. No lower abdominal pain. She has no fever chills. No cough or congestion. No urinary symptoms. She states that she smokes marijuana twice a day, and will smoke 5 cigarettes/day. She has occasional alcohol use with last alcohol use on Saturday. She states that she is under a lot of stress at this time. Patient denies any history of peptic ulcer disease that she knows of. She has a past surgical history of a cholecystectomy. Patient states that she is asymptomatic at this time. Nursing Notes were all reviewed and agreed with or any disagreements were addressed in the HPI. REVIEW OF SYSTEMS    (2-9 systems for level 4, 10 or more for level 5)     Review of Systems   Constitutional: Negative for activity change, appetite change, chills and fever. HENT: Negative for congestion and rhinorrhea. Respiratory: Negative for cough and shortness of breath. Cardiovascular: Negative for chest pain. Gastrointestinal: Positive for abdominal pain and nausea. Negative for diarrhea and vomiting. Genitourinary: Negative for difficulty urinating, dysuria and hematuria. Neurological: Negative for weakness. Positives and Pertinent negatives as per HPI. Except as noted above in the ROS, all other systems were reviewed and negative. PAST MEDICAL HISTORY     Past Medical History:   Diagnosis Date    Postpartum depression 2015 & 2016    Psychiatric problem     [facility]) 2015    overdose on depression medication    UTI (urinary tract infection) 06/09/2016         SURGICAL HISTORY     Past Surgical History:   Procedure Laterality Date    CHOLECYSTECTOMY      TUBAL LIGATION  2013    TYMPANOSTOMY TUBE PLACEMENT Bilateral     as a child         [address]       Discharge Medication List as of 1/12/2021  1:05 PM      CONTINUE these medications which have NOT CHANGED    Details   venlafaxine (EFFEXOR XR) 37.5 MG extended release capsule Take 1 capsule by mouth daily, Disp-7 capsule,R-0Normal      traZODone (DESYREL) 50 MG tablet Take 1.5 tab to 2 tab PO qhs prn sleep, Disp-60 tablet,R-1Normal      venlafaxine 75 MG extended release tablet Take 1 tablet by mouth daily Start after completion of 37.5mg daily dose caps., Disp-30 tablet,R-1Normal      SUMAtriptan (IMITREX) 25 MG tablet Take 1 tablet at once of migraine. Repeat dose after two hours if headache relief is partially effective or headache recurs. , Disp-9 tablet,R-5Normal ondansetron (ZOFRAN) 8 MG tablet Take 1 tablet by mouth every 8 hours as needed for Nausea, Disp-20 tablet,R-1Normal               ALLERGIES     Patient has no known allergies. FAMILYHISTORY       Family History   Problem Relation Age of Onset    No Known Problems Mother     Mental Illness Father         PTSD- [employer]    ADHD Brother     No Known Problems Maternal Aunt     No Known Problems Maternal Uncle     No Known Problems Paternal Aunt     No Known Problems Paternal Uncle     Diabetes Maternal Grandmother     Hypertension Maternal Grandmother     Glaucoma Maternal Grandfather     No Known Problems Paternal Grandmother     No Known Problems Paternal Grandfather     No Known Problems Other     Rheum Arthritis Neg Hx     Osteoarthritis Neg Hx     Asthma Neg Hx     Breast Cancer Neg Hx     Cancer Neg Hx     Heart Failure Neg Hx     High Cholesterol Neg Hx     Migraines Neg Hx     Ovarian Cancer Neg Hx     Rashes/Skin Problems Neg Hx     Seizures Neg Hx     Stroke Neg Hx     Thyroid Disease Neg Hx           SOCIAL HISTORY       Social History     Tobacco Use    Smoking status: Current Every Day Smoker     Packs/day: 0.25     Years: 5.00     Pack years: 1.25     Types: Cigarettes     Start date: 2014    Smokeless tobacco: Never Used    Tobacco comment: cessation 7/16   Substance Use Topics    Alcohol use: Yes     Alcohol/week: 2.0 standard drinks     Types: 2 Glasses of wine per week     Comment: Social    Drug use: Yes     Types: Marijuana     Comment: smokes 15 joints/ week       SCREENINGS             PHYSICAL EXAM    (up to 7 for level 4, 8 or more for level 5)     ED Triage Vitals   BP Temp Temp Source Pulse Resp SpO2 Height Weight   01/12/21 1143 01/12/21 1143 01/12/21 1143 01/12/21 1143 01/12/21 1143 01/12/21 1143 -- 01/12/21 1145   126/63 98.8 °F (37.1 °C) Infrared 84 18 98 %  (!) 350 lb (158.8 kg)       Physical Exam  Vitals signs and nursing note reviewed.    Constitutional: Appearance: She is well-developed. She is not diaphoretic. Comments: Well-appearing in no acute distress, smiling talkative and interactive on examination. Looking at her cell phone. HENT:      Head: Normocephalic and atraumatic. Right Ear: External ear normal.      Left Ear: External ear normal.      Nose: Nose normal.   Eyes:      General:         Right eye: No discharge. Left eye: No discharge. Neck:      Musculoskeletal: Normal range of motion and neck supple. Trachea: No tracheal deviation. Cardiovascular:      Rate and Rhythm: Normal rate and regular rhythm. Heart sounds: No murmur. Pulmonary:      Effort: Pulmonary effort is normal. No respiratory distress. Breath sounds: Normal breath sounds. No wheezing or rales. Abdominal:      General: Bowel sounds are normal. There is no distension. Palpations: Abdomen is soft. Tenderness: There is no abdominal tenderness. There is no guarding or rebound. Musculoskeletal: Normal range of motion. Skin:     General: Skin is warm and dry. Neurological:      Mental Status: She is alert and oriented to person, place, and time.    Psychiatric:         Behavior: Behavior normal.         DIAGNOSTIC RESULTS   LABS:    Labs Reviewed   CBC WITH AUTO DIFFERENTIAL - Abnormal; Notable for the following components:       Result Value    Lymphocytes Absolute 0.8 (*)     All other components within normal limits    Narrative:     Performed at:  [facility] [address], [place], [facility] [place]   Phone [phone]   COMPREHENSIVE METABOLIC PANEL - Abnormal; Notable for the following components:    CREATININE <0.5 (*)     Albumin/Globulin Ratio 0.9 (*)     AST 13 (*)     All other components within normal limits    Narrative:     Performed at:  [facility]  [address] [facility], [facility] [place] [email or web address]   Phone [phone] URINE RT REFLEX TO CULTURE - Abnormal; Notable for the following components:    Clarity, UA CLOUDY (*)     Leukocyte Esterase, Urine MODERATE (*)     All other components within normal limits    Narrative:     Performed at:  [facility] [address], [place], [address] [place] [employer]   Phone [phone]   MICROSCOPIC URINALYSIS - Abnormal; Notable for the following components:    Bacteria, UA RARE (*)     WBC, UA 31 (*)     All other components within normal limits    Narrative:     Performed at:  [facility] [address], [place], [facility] [employer]   Phone [phone]   CULTURE, URINE   LIPASE    Narrative:     Performed at:  [facility] [address], [place], [facility] [email or web address]   Phone [phone]   HCG, SERUM, QUALITATIVE    Narrative:     Performed at:  [facility] [address], [place], [address] [email or web address]   Phone [phone]       All other labs were within normal range or not returned as of this dictation. EKG: All EKG's are interpreted by the Emergency Department Physician in the absence of a cardiologist.  Please see their note for interpretation of EKG. RADIOLOGY:   Non-plain film images such as CT, Ultrasound and MRI are read by the radiologist. Plain radiographic images are visualized and preliminarily interpreted by the ED Provider with the below findings:        Interpretation per the Radiologist below, if available at the time of this note:    No orders to display     No results found.         PROCEDURES   Unless otherwise noted below, none     Procedures    CRITICAL CARE TIME   N/A    CONSULTS:  None      EMERGENCY DEPARTMENT COURSE and DIFFERENTIAL DIAGNOSIS/MDM:   Vitals:    Vitals:    01/12/21 1143 01/12/21 1145   BP: 126/63    Pulse: 84    Resp: 18    Temp: 98.8 °F (37.1 °C)    TempSrc: Infrared    SpO2: 98%    Weight:  (!) 350 lb (158.8 kg) Patient was given a GI cocktail in the ED, she has been asymptomatic throughout my evaluations, and I feel she can manage as an outpatient. I feel that her symptoms today could be due to dyspepsia, I did discuss with her about cutting back on drinking, smoking, spicy foods. She also states that she is under stress. Patient will be given a prescription for Pepcid, omeprazole and Carafate. As her abdomen is benign, her work-up is negative, I do not feel that she needs any further work-up at this time including any imaging as it would not change her management. Recommended close follow-up with her primary care physician within 2 to 3 days reevaluation. She is to return to the ED for any new or worsening symptoms. Patient voiced understanding is agreeable with plan. Stable for discharge. My suspicion is low at this time for acute appendicitis, acute cholecystitis, cholangitis, pancreatitis, diverticulitis, perforated viscus, perforated ulcer, colitis, C. diff colitis, ischemic colitis, bowel obstruction, AAA, acute dissection, or other emergent etiology      FINAL IMPRESSION      1. Abdominal pain, epigastric    2.  Acute cystitis without hematuria          DISPOSITION/PLAN   DISPOSITION Decision To Discharge 01/12/2021 01:07:42 PM      PATIENT REFERREDTO:  Jazmine Hunter, APRN - CNP  [address] [facility]  [phone]    Schedule an appointment as soon as possible for a visit in 2 days      [facility] Emergency Department  [address] [facility]  [phone]    As needed, If symptoms worsen      DISCHARGE MEDICATIONS:  Discharge Medication List as of 1/12/2021  1:05 PM      START taking these medications    Details   famotidine (PEPCID) 20 MG tablet Take 1 tablet by mouth 2 times daily, Disp-60 tablet, R-0Normal      omeprazole (PRILOSEC) 20 MG delayed release capsule Take 1 capsule by mouth 2 times daily (before meals), Disp-60 capsule, R-0Normal ondansetron (ZOFRAN ODT) 4 MG disintegrating tablet Take 1 tablet by mouth every 8 hours as needed for Nausea, Disp-20 tablet, R-0Normal      sucralfate (CARAFATE) 1 GM/10ML suspension Take 10 mLs by mouth 4 times daily, Disp-1200 mL, R-0Normal             DISCONTINUED MEDICATIONS:  Discharge Medication List as of 1/12/2021  1:05 PM                 (Please note that portions of this note were completed with a voice recognition program.  Efforts were made to edit the dictations but occasionally words are mis-transcribed.)    Rg James PA-C (electronically signed)            Rg James PA-C  01/12/21 2889

## 2021-01-12 NOTE — TELEPHONE ENCOUNTER
Patient called pre-service center [facility]) [address] with red flag complaint. Brief description of triage: Stomach cramps started during the night, chills started then as well. Weakness, fatigue, sob, abdominal pain. Triage indicates for patient to go to ED    Care advice provided, patient verbalizes understanding; denies any other questions or concerns; instructed to call back for any new or worsening symptoms. Attention Provider: Thank you for allowing me to participate in the care of your patient. The patient was connected to triage in response to information provided to the ECC. Please do not respond through this encounter as the response is not directed to a shared pool. Reason for Disposition   SEVERE abdominal pain (e.g., excruciating)    Answer Assessment - Initial Assessment Questions  1. LOCATION: \"Where does it hurt? \"       Starts above belly button and then covers the entire abdomen  2. RADIATION: \"Does the pain shoot anywhere else? \" (e.g., chest, back)      n/a  3. ONSET: \"When did the pain begin? \" (e.g., minutes, hours or days ago)       During the night  4. SUDDEN: \"Gradual or sudden onset? \"      sudden  5. PATTERN \"Does the pain come and go, or is it constant? \"     - If constant: \"Is it getting better, staying the same, or worsening? \"       (Note: Constant means the pain never goes away completely; most serious pain is constant and it progresses)      - If intermittent: \"How long does it last?\" \"Do you have pain now? \"      (Note: Intermittent means the pain goes away completely between bouts)      Comes and goes  6. SEVERITY: \"How bad is the pain? \"  (e.g., Scale 1-10; mild, moderate, or severe)     - MILD (1-3): doesn't interfere with normal activities, abdomen soft and not tender to touch      - MODERATE (4-7): interferes with normal activities or awakens from sleep, tender to touch      - SEVERE (8-10): excruciating pain, doubled over, unable to do any normal activities 9/10  pain  7. RECURRENT SYMPTOM: \"Have you ever had this type of abdominal pain before? \" If so, ask: \"When was the last time? \" and \"What happened that time? \"       no  8. AGGRAVATING FACTORS: \"Does anything seem to cause this pain? \" (e.g., foods, stress, alcohol)      Stress  9. CARDIAC SYMPTOMS: \"Do you have any of the following symptoms: chest pain, difficulty breathing, sweating, nausea? \"      Chest pain when she breaths in, has some difficulty breathing, nausea, sweating and chilling at times. 10. OTHER SYMPTOMS: \"Do you have any other symptoms? \" (e.g., fever, vomiting, diarrhea)        No vomiting, diarrhea, or fever. 11. PREGNANCY: \"Is there any chance you are pregnant? \" \"When was your last menstrual period? \"        no    Protocols used: ABDOMINAL PAIN - UPPER-ADULT-OH

## 2021-01-13 LAB — URINE CULTURE, ROUTINE: NORMAL

## 2021-02-08 ENCOUNTER — TELEPHONE (OUTPATIENT)
Dept: INTERNAL MEDICINE CLINIC | Age: 31
End: 2021-02-08

## 2021-02-08 NOTE — TELEPHONE ENCOUNTER
Patient requesting chart notes be faxed to her employer. Advised there is no physical on file. Patient can  immunization summary and take it to her employer. Patient agreed.

## 2021-02-08 NOTE — TELEPHONE ENCOUNTER
----- Message from Rizwan Odonnell sent at 2/8/2021  3:35 PM EST -----  Subject: Message to Provider    QUESTIONS  Information for Provider? Patient called in and want to have last annual   physical and shot record faxed over to  Mariia Blair? [employer] [phone]. Patient would also like a call when the fax is sent over. ---------------------------------------------------------------------------  --------------  Brayan BILLS  What is the best way for the office to contact you? OK to leave message on   voicemail  Preferred Call Back Phone Number? [phone]  ---------------------------------------------------------------------------  --------------  SCRIPT ANSWERS  Relationship to Patient?  Self

## 2021-03-12 ENCOUNTER — TELEPHONE (OUTPATIENT)
Dept: INTERNAL MEDICINE CLINIC | Age: 31
End: 2021-03-12

## 2021-03-12 NOTE — TELEPHONE ENCOUNTER
Called pt on her mobile to let her know on upcoming appt she can either do physical or pap smear. She was scheduled for both at one appt but Nurse Eloy Bradford can only do one thing on her appt. Couldn't reach out to pt and her voicemail is full.

## 2021-04-22 ENCOUNTER — OFFICE VISIT (OUTPATIENT)
Dept: INTERNAL MEDICINE CLINIC | Age: 31
End: 2021-04-22
Payer: COMMERCIAL

## 2021-04-22 VITALS
OXYGEN SATURATION: 96 % | HEIGHT: 64 IN | SYSTOLIC BLOOD PRESSURE: 110 MMHG | TEMPERATURE: 98.9 F | WEIGHT: 293 LBS | DIASTOLIC BLOOD PRESSURE: 70 MMHG | BODY MASS INDEX: 50.02 KG/M2 | HEART RATE: 107 BPM

## 2021-04-22 DIAGNOSIS — R11.0 NAUSEA: ICD-10-CM

## 2021-04-22 DIAGNOSIS — J30.89 ENVIRONMENTAL AND SEASONAL ALLERGIES: ICD-10-CM

## 2021-04-22 DIAGNOSIS — R12 CHRONIC HEARTBURN: Primary | ICD-10-CM

## 2021-04-22 PROCEDURE — G8417 CALC BMI ABV UP PARAM F/U: HCPCS | Performed by: NURSE PRACTITIONER

## 2021-04-22 PROCEDURE — G8427 DOCREV CUR MEDS BY ELIG CLIN: HCPCS | Performed by: NURSE PRACTITIONER

## 2021-04-22 PROCEDURE — 99214 OFFICE O/P EST MOD 30 MIN: CPT | Performed by: NURSE PRACTITIONER

## 2021-04-22 PROCEDURE — 4004F PT TOBACCO SCREEN RCVD TLK: CPT | Performed by: NURSE PRACTITIONER

## 2021-04-22 RX ORDER — CETIRIZINE HYDROCHLORIDE 10 MG/1
10 TABLET ORAL DAILY
Qty: 30 TABLET | Refills: 0 | Status: SHIPPED | OUTPATIENT
Start: 2021-04-22 | End: 2021-05-22

## 2021-04-22 RX ORDER — ONDANSETRON 4 MG/1
4 TABLET, ORALLY DISINTEGRATING ORAL EVERY 8 HOURS PRN
Qty: 20 TABLET | Refills: 0 | Status: SHIPPED | OUTPATIENT
Start: 2021-04-22 | End: 2022-06-15

## 2021-04-22 RX ORDER — OMEPRAZOLE 20 MG/1
20 CAPSULE, DELAYED RELEASE ORAL
Qty: 60 CAPSULE | Refills: 0 | Status: SHIPPED | OUTPATIENT
Start: 2021-04-22 | End: 2021-05-17

## 2021-04-22 SDOH — ECONOMIC STABILITY: TRANSPORTATION INSECURITY
IN THE PAST 12 MONTHS, HAS THE LACK OF TRANSPORTATION KEPT YOU FROM MEDICAL APPOINTMENTS OR FROM GETTING MEDICATIONS?: YES

## 2021-04-22 SDOH — ECONOMIC STABILITY: FOOD INSECURITY: WITHIN THE PAST 12 MONTHS, THE FOOD YOU BOUGHT JUST DIDN'T LAST AND YOU DIDN'T HAVE MONEY TO GET MORE.: NEVER TRUE

## 2021-04-22 SDOH — ECONOMIC STABILITY: FOOD INSECURITY: WITHIN THE PAST 12 MONTHS, YOU WORRIED THAT YOUR FOOD WOULD RUN OUT BEFORE YOU GOT MONEY TO BUY MORE.: NEVER TRUE

## 2021-04-22 ASSESSMENT — ENCOUNTER SYMPTOMS
ALLERGIC/IMMUNOLOGIC NEGATIVE: 1
RESPIRATORY NEGATIVE: 1
NAUSEA: 1
EYES NEGATIVE: 1

## 2021-04-22 ASSESSMENT — PATIENT HEALTH QUESTIONNAIRE - PHQ9
SUM OF ALL RESPONSES TO PHQ QUESTIONS 1-9: 0

## 2021-04-22 NOTE — PROGRESS NOTES
Subjective:      Patient ID: Marcello Dodson is a 35 y.o. female. Nausea & Vomiting  This is a new problem. The current episode started today. The problem has been unchanged. Associated symptoms include nausea. Nothing aggravates the symptoms. She has tried nothing for the symptoms. The treatment provided no relief. Review of Systems   Constitutional: Negative. HENT: Negative. Eyes: Negative. Respiratory: Negative. Gastrointestinal: Positive for nausea. Genitourinary: Negative. Musculoskeletal: Negative. Allergic/Immunologic: Negative. Neurological: Negative. Vitals:    04/22/21 1538   BP: 110/70   Pulse: 107   Temp: 98.9 °F (37.2 °C)   SpO2: 96%     BP Readings from Last 3 Encounters:   04/22/21 110/70   01/12/21 126/63   11/10/20 122/72     Wt Readings from Last 3 Encounters:   04/22/21 (!) 330 lb (149.7 kg)   01/12/21 (!) 350 lb (158.8 kg)   11/10/20 (!) 318 lb (144.2 kg)     Past Medical History:   Diagnosis Date    Postpartum depression 2015 & 2016    Psychiatric problem     Suicide attempt ([facility] [address] 2015    overdose on depression medication    UTI (urinary tract infection) 06/09/2016     Objective:   Physical Exam  Constitutional:       Appearance: She is normal weight. HENT:      Head: Normocephalic. Right Ear: Hearing, tympanic membrane and external ear normal.      Left Ear: Hearing, tympanic membrane and external ear normal.      Ears:      Comments: Bilateral canal redness     Mouth/Throat:      Lips: Pink. Mouth: Mucous membranes are moist.      Pharynx: Oropharyngeal exudate present. Comments: Moderate amount mucoid drainage noted  Cardiovascular:      Rate and Rhythm: Regular rhythm. Heart sounds: Normal heart sounds. Pulmonary:      Effort: Pulmonary effort is normal.      Breath sounds: Normal breath sounds and air entry. Abdominal:      General: Abdomen is protuberant. Bowel sounds are increased. Palpations: Abdomen is soft.

## 2021-04-22 NOTE — PATIENT INSTRUCTIONS
Stop smoking  Gargle with warm salt and water three times a day  Take allergy medicine every night    Nausea    Take nausea medication every 4 hours as needed  Today soft foods only    Heartburn    Take medication every day  Stop smoking  Drink water only

## 2021-05-14 DIAGNOSIS — R12 CHRONIC HEARTBURN: ICD-10-CM

## 2021-05-14 DIAGNOSIS — J30.89 ENVIRONMENTAL AND SEASONAL ALLERGIES: ICD-10-CM

## 2021-05-14 NOTE — TELEPHONE ENCOUNTER
Recent Visits  Date Type Provider Dept   04/22/21 Office Visit KJ Velásquez CNP [facility]   10/27/20 Office Visit KJ Velásquez CNP [facility]   07/29/20 Office Visit KJ Velásquez CNP [facility]   Showing recent visits within past 540 days with a meds authorizing provider and meeting all other requirements     Future Appointments  No visits were found meeting these conditions.    Showing future appointments within next 150 days with a meds authorizing provider and meeting all other requirements      4/22/2021

## 2021-05-17 RX ORDER — CETIRIZINE HYDROCHLORIDE 10 MG/1
TABLET ORAL
Qty: 90 TABLET | Refills: 0 | OUTPATIENT
Start: 2021-05-17

## 2021-05-17 RX ORDER — OMEPRAZOLE 20 MG/1
20 CAPSULE, DELAYED RELEASE ORAL
Qty: 60 CAPSULE | Refills: 0 | Status: SHIPPED | OUTPATIENT
Start: 2021-05-17 | End: 2022-06-15

## 2021-06-17 ENCOUNTER — OFFICE VISIT (OUTPATIENT)
Dept: INTERNAL MEDICINE CLINIC | Age: 31
End: 2021-06-17
Payer: COMMERCIAL

## 2021-06-17 ENCOUNTER — TELEPHONE (OUTPATIENT)
Dept: INTERNAL MEDICINE CLINIC | Age: 31
End: 2021-06-17

## 2021-06-17 ENCOUNTER — PATIENT MESSAGE (OUTPATIENT)
Dept: INTERNAL MEDICINE CLINIC | Age: 31
End: 2021-06-17

## 2021-06-17 VITALS
BODY MASS INDEX: 56.82 KG/M2 | HEART RATE: 88 BPM | DIASTOLIC BLOOD PRESSURE: 76 MMHG | SYSTOLIC BLOOD PRESSURE: 118 MMHG | OXYGEN SATURATION: 98 % | WEIGHT: 293 LBS | TEMPERATURE: 97.6 F

## 2021-06-17 DIAGNOSIS — F33.2 SEVERE EPISODE OF RECURRENT MAJOR DEPRESSIVE DISORDER, WITHOUT PSYCHOTIC FEATURES (HCC): Primary | ICD-10-CM

## 2021-06-17 DIAGNOSIS — G47.00 INSOMNIA, UNSPECIFIED TYPE: ICD-10-CM

## 2021-06-17 PROCEDURE — G8427 DOCREV CUR MEDS BY ELIG CLIN: HCPCS | Performed by: NURSE PRACTITIONER

## 2021-06-17 PROCEDURE — G8417 CALC BMI ABV UP PARAM F/U: HCPCS | Performed by: NURSE PRACTITIONER

## 2021-06-17 PROCEDURE — 99213 OFFICE O/P EST LOW 20 MIN: CPT | Performed by: NURSE PRACTITIONER

## 2021-06-17 PROCEDURE — 4004F PT TOBACCO SCREEN RCVD TLK: CPT | Performed by: NURSE PRACTITIONER

## 2021-06-17 RX ORDER — VENLAFAXINE HYDROCHLORIDE 75 MG/1
75 TABLET, EXTENDED RELEASE ORAL DAILY
Qty: 30 TABLET | Refills: 1 | Status: SHIPPED | OUTPATIENT
Start: 2021-06-17 | End: 2022-06-15

## 2021-06-17 RX ORDER — TRAZODONE HYDROCHLORIDE 50 MG/1
TABLET ORAL
Qty: 60 TABLET | Refills: 1 | Status: SHIPPED | OUTPATIENT
Start: 2021-06-17 | End: 2022-06-15

## 2021-06-17 ASSESSMENT — ENCOUNTER SYMPTOMS
GASTROINTESTINAL NEGATIVE: 1
EYES NEGATIVE: 1
RESPIRATORY NEGATIVE: 1
ALLERGIC/IMMUNOLOGIC NEGATIVE: 1

## 2021-06-17 ASSESSMENT — PATIENT HEALTH QUESTIONNAIRE - PHQ9
7. TROUBLE CONCENTRATING ON THINGS, SUCH AS READING THE NEWSPAPER OR WATCHING TELEVISION: 1
4. FEELING TIRED OR HAVING LITTLE ENERGY: 1
1. LITTLE INTEREST OR PLEASURE IN DOING THINGS: 3
SUM OF ALL RESPONSES TO PHQ QUESTIONS 1-9: 9
8. MOVING OR SPEAKING SO SLOWLY THAT OTHER PEOPLE COULD HAVE NOTICED. OR THE OPPOSITE, BEING SO FIGETY OR RESTLESS THAT YOU HAVE BEEN MOVING AROUND A LOT MORE THAN USUAL: 0
SUM OF ALL RESPONSES TO PHQ QUESTIONS 1-9: 9
6. FEELING BAD ABOUT YOURSELF - OR THAT YOU ARE A FAILURE OR HAVE LET YOURSELF OR YOUR FAMILY DOWN: 1
9. THOUGHTS THAT YOU WOULD BE BETTER OFF DEAD, OR OF HURTING YOURSELF: 0
SUM OF ALL RESPONSES TO PHQ QUESTIONS 1-9: 9
3. TROUBLE FALLING OR STAYING ASLEEP: 1
5. POOR APPETITE OR OVEREATING: 1
SUM OF ALL RESPONSES TO PHQ9 QUESTIONS 1 & 2: 4
2. FEELING DOWN, DEPRESSED OR HOPELESS: 1

## 2021-06-17 NOTE — TELEPHONE ENCOUNTER
From: Marcello Dodson  To: KJ Jean Baptiste - CNP  Sent: 6/17/2021 10:33 AM EDT  Subject: Non-Urgent Medical Question    I will be there on time thank you!

## 2021-06-17 NOTE — TELEPHONE ENCOUNTER
Pt called wanting to set up an office visit with Nurse Seb Drake or Dr. Jerrica Sanon for Anxiety.      [phone]
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
## 2021-06-18 ENCOUNTER — TELEPHONE (OUTPATIENT)
Dept: ADMINISTRATIVE | Age: 31
End: 2021-06-18

## 2021-09-07 ENCOUNTER — TELEPHONE (OUTPATIENT)
Dept: INTERNAL MEDICINE CLINIC | Age: 31
End: 2021-09-07

## 2021-09-07 ENCOUNTER — HOSPITAL ENCOUNTER (EMERGENCY)
Age: 31
Discharge: LEFT BEFORE DISCHARGE | End: 2021-09-07
Payer: COMMERCIAL

## 2021-09-07 NOTE — TELEPHONE ENCOUNTER
----- Message from Aylin Rae sent at 9/7/2021 11:33 AM EDT -----  Subject: Message to Provider    QUESTIONS  Information for Provider? Patient would like to have a Covid test done   because she is having chills, sore throat, cough, and hot flashes. She   does not know of say exposure she had with Covid. She would like a test   ordered so she can be sure she does not have it.   ---------------------------------------------------------------------------  --------------  CALL BACK INFO  What is the best way for the office to contact you? OK to leave message on   voicemail  Preferred Call Back Phone Number? [phone]  ---------------------------------------------------------------------------  --------------  SCRIPT ANSWERS  Relationship to Patient?  Self

## 2022-06-07 ENCOUNTER — HOSPITAL ENCOUNTER (OUTPATIENT)
Age: 32
Setting detail: OBSERVATION
Discharge: HOME | End: 2022-06-08
Attending: STUDENT IN AN ORGANIZED HEALTH CARE EDUCATION/TRAINING PROGRAM | Admitting: SURGERY
Payer: COMMERCIAL

## 2022-06-07 ENCOUNTER — APPOINTMENT (OUTPATIENT)
Dept: CT IMAGING | Age: 32
End: 2022-06-07
Payer: COMMERCIAL

## 2022-06-07 ENCOUNTER — ANESTHESIA (OUTPATIENT)
Dept: OPERATING ROOM | Age: 32
End: 2022-06-07
Payer: COMMERCIAL

## 2022-06-07 ENCOUNTER — ANESTHESIA EVENT (OUTPATIENT)
Dept: OPERATING ROOM | Age: 32
End: 2022-06-07
Payer: COMMERCIAL

## 2022-06-07 DIAGNOSIS — K35.30 ACUTE APPENDICITIS WITH LOCALIZED PERITONITIS, WITHOUT PERFORATION, ABSCESS, OR GANGRENE: Primary | ICD-10-CM

## 2022-06-07 PROBLEM — K35.80 ACUTE APPENDICITIS: Status: ACTIVE | Noted: 2022-06-07

## 2022-06-07 LAB
A/G RATIO: 1.2 (ref 1.1–2.2)
ALBUMIN SERPL-MCNC: 3.8 G/DL (ref 3.4–5)
ALP BLD-CCNC: 71 U/L (ref 40–129)
ALT SERPL-CCNC: 12 U/L (ref 10–40)
ANION GAP SERPL CALCULATED.3IONS-SCNC: 14 MMOL/L (ref 3–16)
AST SERPL-CCNC: 17 U/L (ref 15–37)
BACTERIA: ABNORMAL /HPF
BASOPHILS ABSOLUTE: 0 K/UL (ref 0–0.2)
BASOPHILS RELATIVE PERCENT: 0.1 %
BILIRUB SERPL-MCNC: 0.4 MG/DL (ref 0–1)
BILIRUBIN URINE: NEGATIVE
BILIRUBIN URINE: NEGATIVE
BLOOD, URINE: NEGATIVE
BLOOD, URINE: NEGATIVE
BUN BLDV-MCNC: 8 MG/DL (ref 7–20)
CALCIUM SERPL-MCNC: 8.9 MG/DL (ref 8.3–10.6)
CHLORIDE BLD-SCNC: 106 MMOL/L (ref 99–110)
CLARITY: CLEAR
CLARITY: CLEAR
CO2: 17 MMOL/L (ref 21–32)
COLOR: YELLOW
COLOR: YELLOW
CREAT SERPL-MCNC: 0.6 MG/DL (ref 0.6–1.1)
EOSINOPHILS ABSOLUTE: 0 K/UL (ref 0–0.6)
EOSINOPHILS RELATIVE PERCENT: 0.1 %
EPITHELIAL CELLS, UA: 9 /HPF (ref 0–5)
EPITHELIAL CELLS, UA: NORMAL /HPF (ref 0–5)
GFR AFRICAN AMERICAN: >60
GFR NON-AFRICAN AMERICAN: >60
GLUCOSE BLD-MCNC: 134 MG/DL (ref 70–99)
GLUCOSE URINE: NEGATIVE MG/DL
GLUCOSE URINE: NEGATIVE MG/DL
HCG(URINE) PREGNANCY TEST: NEGATIVE
HCT VFR BLD CALC: 43.3 % (ref 36–48)
HEMOGLOBIN: 13.4 G/DL (ref 12–16)
HYALINE CASTS: 0 /LPF (ref 0–8)
KETONES, URINE: 15 MG/DL
KETONES, URINE: NEGATIVE MG/DL
LEUKOCYTE ESTERASE, URINE: ABNORMAL
LEUKOCYTE ESTERASE, URINE: NEGATIVE
LIPASE: 12 U/L (ref 13–60)
LYMPHOCYTES ABSOLUTE: 1 K/UL (ref 1–5.1)
LYMPHOCYTES RELATIVE PERCENT: 6.2 %
MCH RBC QN AUTO: 32.4 PG (ref 26–34)
MCHC RBC AUTO-ENTMCNC: 30.9 G/DL (ref 31–36)
MCV RBC AUTO: 104.8 FL (ref 80–100)
MICROSCOPIC EXAMINATION: YES
MICROSCOPIC EXAMINATION: YES
MONOCYTES ABSOLUTE: 0.4 K/UL (ref 0–1.3)
MONOCYTES RELATIVE PERCENT: 2.2 %
NEUTROPHILS ABSOLUTE: 15.1 K/UL (ref 1.7–7.7)
NEUTROPHILS RELATIVE PERCENT: 91.4 %
NITRITE, URINE: NEGATIVE
NITRITE, URINE: NEGATIVE
PDW BLD-RTO: 17.9 % (ref 12.4–15.4)
PH UA: 7 (ref 5–8)
PH UA: 8 (ref 5–8)
PLATELET # BLD: 300 K/UL (ref 135–450)
PMV BLD AUTO: 9.1 FL (ref 5–10.5)
POTASSIUM REFLEX MAGNESIUM: 4.3 MMOL/L (ref 3.5–5.1)
PROTEIN UA: 30 MG/DL
PROTEIN UA: ABNORMAL MG/DL
RBC # BLD: 4.13 M/UL (ref 4–5.2)
RBC UA: 1 /HPF (ref 0–4)
RBC UA: NORMAL /HPF (ref 0–4)
SODIUM BLD-SCNC: 137 MMOL/L (ref 136–145)
SPECIFIC GRAVITY UA: >=1.03 (ref 1–1.03)
SPECIFIC GRAVITY UA: >=1.03 (ref 1–1.03)
TOTAL PROTEIN: 6.9 G/DL (ref 6.4–8.2)
URINE REFLEX TO CULTURE: ABNORMAL
URINE REFLEX TO CULTURE: YES
URINE TYPE: ABNORMAL
URINE TYPE: ABNORMAL
UROBILINOGEN, URINE: 0.2 E.U./DL
UROBILINOGEN, URINE: 0.2 E.U./DL
WBC # BLD: 16.5 K/UL (ref 4–11)
WBC UA: 24 /HPF (ref 0–5)
WBC UA: NORMAL /HPF (ref 0–5)

## 2022-06-07 PROCEDURE — 2709999900 HC NON-CHARGEABLE SUPPLY: Performed by: SURGERY

## 2022-06-07 PROCEDURE — 6360000002 HC RX W HCPCS: Performed by: ANESTHESIOLOGY

## 2022-06-07 PROCEDURE — 2720000010 HC SURG SUPPLY STERILE: Performed by: SURGERY

## 2022-06-07 PROCEDURE — 81001 URINALYSIS AUTO W/SCOPE: CPT

## 2022-06-07 PROCEDURE — 6360000002 HC RX W HCPCS: Performed by: SURGERY

## 2022-06-07 PROCEDURE — APPNB30 APP NON BILLABLE TIME 0-30 MINS: Performed by: NURSE PRACTITIONER

## 2022-06-07 PROCEDURE — 84703 CHORIONIC GONADOTROPIN ASSAY: CPT

## 2022-06-07 PROCEDURE — 2580000003 HC RX 258: Performed by: SURGERY

## 2022-06-07 PROCEDURE — 74177 CT ABD & PELVIS W/CONTRAST: CPT

## 2022-06-07 PROCEDURE — 7100000000 HC PACU RECOVERY - FIRST 15 MIN: Performed by: SURGERY

## 2022-06-07 PROCEDURE — 87086 URINE CULTURE/COLONY COUNT: CPT

## 2022-06-07 PROCEDURE — 96375 TX/PRO/DX INJ NEW DRUG ADDON: CPT

## 2022-06-07 PROCEDURE — G0378 HOSPITAL OBSERVATION PER HR: HCPCS

## 2022-06-07 PROCEDURE — APPSS60 APP SPLIT SHARED TIME 46-60 MINUTES: Performed by: NURSE PRACTITIONER

## 2022-06-07 PROCEDURE — 7100000001 HC PACU RECOVERY - ADDTL 15 MIN: Performed by: SURGERY

## 2022-06-07 PROCEDURE — 96361 HYDRATE IV INFUSION ADD-ON: CPT

## 2022-06-07 PROCEDURE — 99219 PR INITIAL OBSERVATION CARE/DAY 50 MINUTES: CPT | Performed by: SURGERY

## 2022-06-07 PROCEDURE — 80053 COMPREHEN METABOLIC PANEL: CPT

## 2022-06-07 PROCEDURE — 2500000003 HC RX 250 WO HCPCS: Performed by: NURSE ANESTHETIST, CERTIFIED REGISTERED

## 2022-06-07 PROCEDURE — A4217 STERILE WATER/SALINE, 500 ML: HCPCS | Performed by: SURGERY

## 2022-06-07 PROCEDURE — 99285 EMERGENCY DEPT VISIT HI MDM: CPT

## 2022-06-07 PROCEDURE — 96365 THER/PROPH/DIAG IV INF INIT: CPT

## 2022-06-07 PROCEDURE — 96376 TX/PRO/DX INJ SAME DRUG ADON: CPT

## 2022-06-07 PROCEDURE — 6370000000 HC RX 637 (ALT 250 FOR IP): Performed by: SURGERY

## 2022-06-07 PROCEDURE — 3700000001 HC ADD 15 MINUTES (ANESTHESIA): Performed by: SURGERY

## 2022-06-07 PROCEDURE — 6360000002 HC RX W HCPCS: Performed by: NURSE ANESTHETIST, CERTIFIED REGISTERED

## 2022-06-07 PROCEDURE — 3600000014 HC SURGERY LEVEL 4 ADDTL 15MIN: Performed by: SURGERY

## 2022-06-07 PROCEDURE — 88304 TISSUE EXAM BY PATHOLOGIST: CPT

## 2022-06-07 PROCEDURE — 3700000000 HC ANESTHESIA ATTENDED CARE: Performed by: SURGERY

## 2022-06-07 PROCEDURE — 6360000002 HC RX W HCPCS: Performed by: NURSE PRACTITIONER

## 2022-06-07 PROCEDURE — 83690 ASSAY OF LIPASE: CPT

## 2022-06-07 PROCEDURE — 94150 VITAL CAPACITY TEST: CPT

## 2022-06-07 PROCEDURE — 6360000004 HC RX CONTRAST MEDICATION: Performed by: STUDENT IN AN ORGANIZED HEALTH CARE EDUCATION/TRAINING PROGRAM

## 2022-06-07 PROCEDURE — 85025 COMPLETE CBC W/AUTO DIFF WBC: CPT

## 2022-06-07 PROCEDURE — 6360000002 HC RX W HCPCS: Performed by: STUDENT IN AN ORGANIZED HEALTH CARE EDUCATION/TRAINING PROGRAM

## 2022-06-07 PROCEDURE — 44970 LAPAROSCOPY APPENDECTOMY: CPT | Performed by: SURGERY

## 2022-06-07 PROCEDURE — 2580000003 HC RX 258: Performed by: STUDENT IN AN ORGANIZED HEALTH CARE EDUCATION/TRAINING PROGRAM

## 2022-06-07 PROCEDURE — 2500000003 HC RX 250 WO HCPCS: Performed by: SURGERY

## 2022-06-07 PROCEDURE — 36415 COLL VENOUS BLD VENIPUNCTURE: CPT

## 2022-06-07 PROCEDURE — 3600000004 HC SURGERY LEVEL 4 BASE: Performed by: SURGERY

## 2022-06-07 RX ORDER — SODIUM CHLORIDE 9 MG/ML
INJECTION, SOLUTION INTRAVENOUS CONTINUOUS
Status: DISCONTINUED | OUTPATIENT
Start: 2022-06-07 | End: 2022-06-08 | Stop reason: HOSPADM

## 2022-06-07 RX ORDER — HYDROMORPHONE HCL 110MG/55ML
0.5 PATIENT CONTROLLED ANALGESIA SYRINGE INTRAVENOUS EVERY 5 MIN PRN
Status: DISCONTINUED | OUTPATIENT
Start: 2022-06-07 | End: 2022-06-07 | Stop reason: HOSPADM

## 2022-06-07 RX ORDER — ONDANSETRON 2 MG/ML
4 INJECTION INTRAMUSCULAR; INTRAVENOUS EVERY 6 HOURS PRN
Status: DISCONTINUED | OUTPATIENT
Start: 2022-06-07 | End: 2022-06-08 | Stop reason: HOSPADM

## 2022-06-07 RX ORDER — SUCCINYLCHOLINE/SOD CL,ISO/PF 200MG/10ML
SYRINGE (ML) INTRAVENOUS PRN
Status: DISCONTINUED | OUTPATIENT
Start: 2022-06-07 | End: 2022-06-07 | Stop reason: SDUPTHER

## 2022-06-07 RX ORDER — OXYCODONE HYDROCHLORIDE 5 MG/1
10 TABLET ORAL EVERY 4 HOURS PRN
Status: DISCONTINUED | OUTPATIENT
Start: 2022-06-07 | End: 2022-06-08 | Stop reason: HOSPADM

## 2022-06-07 RX ORDER — SODIUM CHLORIDE 9 MG/ML
INJECTION, SOLUTION INTRAVENOUS PRN
Status: DISCONTINUED | OUTPATIENT
Start: 2022-06-07 | End: 2022-06-08 | Stop reason: HOSPADM

## 2022-06-07 RX ORDER — ROCURONIUM BROMIDE 10 MG/ML
INJECTION, SOLUTION INTRAVENOUS PRN
Status: DISCONTINUED | OUTPATIENT
Start: 2022-06-07 | End: 2022-06-07 | Stop reason: SDUPTHER

## 2022-06-07 RX ORDER — KETOROLAC TROMETHAMINE 30 MG/ML
INJECTION, SOLUTION INTRAMUSCULAR; INTRAVENOUS PRN
Status: DISCONTINUED | OUTPATIENT
Start: 2022-06-07 | End: 2022-06-07 | Stop reason: SDUPTHER

## 2022-06-07 RX ORDER — ONDANSETRON 2 MG/ML
INJECTION INTRAMUSCULAR; INTRAVENOUS PRN
Status: DISCONTINUED | OUTPATIENT
Start: 2022-06-07 | End: 2022-06-07 | Stop reason: SDUPTHER

## 2022-06-07 RX ORDER — ONDANSETRON 4 MG/1
4 TABLET, ORALLY DISINTEGRATING ORAL EVERY 8 HOURS PRN
Status: DISCONTINUED | OUTPATIENT
Start: 2022-06-07 | End: 2022-06-08 | Stop reason: HOSPADM

## 2022-06-07 RX ORDER — PROPOFOL 10 MG/ML
INJECTION, EMULSION INTRAVENOUS PRN
Status: DISCONTINUED | OUTPATIENT
Start: 2022-06-07 | End: 2022-06-07 | Stop reason: SDUPTHER

## 2022-06-07 RX ORDER — FENTANYL CITRATE 50 UG/ML
INJECTION, SOLUTION INTRAMUSCULAR; INTRAVENOUS PRN
Status: DISCONTINUED | OUTPATIENT
Start: 2022-06-07 | End: 2022-06-07 | Stop reason: SDUPTHER

## 2022-06-07 RX ORDER — SODIUM CHLORIDE, SODIUM LACTATE, POTASSIUM CHLORIDE, CALCIUM CHLORIDE 600; 310; 30; 20 MG/100ML; MG/100ML; MG/100ML; MG/100ML
INJECTION, SOLUTION INTRAVENOUS CONTINUOUS
Status: DISCONTINUED | OUTPATIENT
Start: 2022-06-07 | End: 2022-06-08 | Stop reason: HOSPADM

## 2022-06-07 RX ORDER — KETAMINE HCL IN NACL, ISO-OSM 100MG/10ML
SYRINGE (ML) INJECTION PRN
Status: DISCONTINUED | OUTPATIENT
Start: 2022-06-07 | End: 2022-06-07 | Stop reason: SDUPTHER

## 2022-06-07 RX ORDER — KETOROLAC TROMETHAMINE 30 MG/ML
30 INJECTION, SOLUTION INTRAMUSCULAR; INTRAVENOUS EVERY 6 HOURS
Status: DISCONTINUED | OUTPATIENT
Start: 2022-06-07 | End: 2022-06-08 | Stop reason: HOSPADM

## 2022-06-07 RX ORDER — BUPIVACAINE HYDROCHLORIDE 5 MG/ML
INJECTION, SOLUTION EPIDURAL; INTRACAUDAL
Status: COMPLETED | OUTPATIENT
Start: 2022-06-07 | End: 2022-06-07

## 2022-06-07 RX ORDER — MORPHINE SULFATE 2 MG/ML
2 INJECTION, SOLUTION INTRAMUSCULAR; INTRAVENOUS
Status: DISCONTINUED | OUTPATIENT
Start: 2022-06-07 | End: 2022-06-07 | Stop reason: SDUPTHER

## 2022-06-07 RX ORDER — MEPERIDINE HYDROCHLORIDE 25 MG/ML
12.5 INJECTION INTRAMUSCULAR; INTRAVENOUS; SUBCUTANEOUS EVERY 5 MIN PRN
Status: DISCONTINUED | OUTPATIENT
Start: 2022-06-07 | End: 2022-06-07 | Stop reason: HOSPADM

## 2022-06-07 RX ORDER — SODIUM CHLORIDE 0.9 % (FLUSH) 0.9 %
5-40 SYRINGE (ML) INJECTION PRN
Status: DISCONTINUED | OUTPATIENT
Start: 2022-06-07 | End: 2022-06-08 | Stop reason: HOSPADM

## 2022-06-07 RX ORDER — MORPHINE SULFATE 2 MG/ML
2 INJECTION, SOLUTION INTRAMUSCULAR; INTRAVENOUS
Status: DISCONTINUED | OUTPATIENT
Start: 2022-06-07 | End: 2022-06-08 | Stop reason: HOSPADM

## 2022-06-07 RX ORDER — ENOXAPARIN SODIUM 100 MG/ML
40 INJECTION SUBCUTANEOUS EVERY 24 HOURS
Status: DISCONTINUED | OUTPATIENT
Start: 2022-06-07 | End: 2022-06-08 | Stop reason: HOSPADM

## 2022-06-07 RX ORDER — DEXMEDETOMIDINE HYDROCHLORIDE 100 UG/ML
INJECTION, SOLUTION INTRAVENOUS PRN
Status: DISCONTINUED | OUTPATIENT
Start: 2022-06-07 | End: 2022-06-07 | Stop reason: SDUPTHER

## 2022-06-07 RX ORDER — MORPHINE SULFATE 4 MG/ML
4 INJECTION, SOLUTION INTRAMUSCULAR; INTRAVENOUS
Status: DISCONTINUED | OUTPATIENT
Start: 2022-06-07 | End: 2022-06-08 | Stop reason: HOSPADM

## 2022-06-07 RX ORDER — LABETALOL HYDROCHLORIDE 5 MG/ML
10 INJECTION, SOLUTION INTRAVENOUS
Status: DISCONTINUED | OUTPATIENT
Start: 2022-06-07 | End: 2022-06-07 | Stop reason: HOSPADM

## 2022-06-07 RX ORDER — ACETAMINOPHEN 325 MG/1
650 TABLET ORAL EVERY 6 HOURS
Status: DISCONTINUED | OUTPATIENT
Start: 2022-06-07 | End: 2022-06-08 | Stop reason: HOSPADM

## 2022-06-07 RX ORDER — ONDANSETRON 2 MG/ML
4 INJECTION INTRAMUSCULAR; INTRAVENOUS
Status: COMPLETED | OUTPATIENT
Start: 2022-06-07 | End: 2022-06-07

## 2022-06-07 RX ORDER — MAGNESIUM HYDROXIDE 1200 MG/15ML
LIQUID ORAL CONTINUOUS PRN
Status: COMPLETED | OUTPATIENT
Start: 2022-06-07 | End: 2022-06-07

## 2022-06-07 RX ORDER — HYDRALAZINE HYDROCHLORIDE 20 MG/ML
10 INJECTION INTRAMUSCULAR; INTRAVENOUS
Status: DISCONTINUED | OUTPATIENT
Start: 2022-06-07 | End: 2022-06-07 | Stop reason: HOSPADM

## 2022-06-07 RX ORDER — MORPHINE SULFATE 4 MG/ML
4 INJECTION, SOLUTION INTRAMUSCULAR; INTRAVENOUS
Status: DISCONTINUED | OUTPATIENT
Start: 2022-06-07 | End: 2022-06-07 | Stop reason: SDUPTHER

## 2022-06-07 RX ORDER — DEXAMETHASONE SODIUM PHOSPHATE 4 MG/ML
INJECTION, SOLUTION INTRA-ARTICULAR; INTRALESIONAL; INTRAMUSCULAR; INTRAVENOUS; SOFT TISSUE PRN
Status: DISCONTINUED | OUTPATIENT
Start: 2022-06-07 | End: 2022-06-07 | Stop reason: SDUPTHER

## 2022-06-07 RX ORDER — MIDAZOLAM HYDROCHLORIDE 1 MG/ML
INJECTION INTRAMUSCULAR; INTRAVENOUS PRN
Status: DISCONTINUED | OUTPATIENT
Start: 2022-06-07 | End: 2022-06-07 | Stop reason: SDUPTHER

## 2022-06-07 RX ORDER — OXYCODONE HYDROCHLORIDE 5 MG/1
5 TABLET ORAL EVERY 4 HOURS PRN
Status: DISCONTINUED | OUTPATIENT
Start: 2022-06-07 | End: 2022-06-08 | Stop reason: HOSPADM

## 2022-06-07 RX ORDER — SODIUM CHLORIDE 0.9 % (FLUSH) 0.9 %
5-40 SYRINGE (ML) INJECTION EVERY 12 HOURS SCHEDULED
Status: DISCONTINUED | OUTPATIENT
Start: 2022-06-07 | End: 2022-06-08 | Stop reason: HOSPADM

## 2022-06-07 RX ORDER — OXYCODONE HYDROCHLORIDE 5 MG/1
5 TABLET ORAL
Status: DISCONTINUED | OUTPATIENT
Start: 2022-06-07 | End: 2022-06-07 | Stop reason: HOSPADM

## 2022-06-07 RX ADMIN — IOPAMIDOL 75 ML: 755 INJECTION, SOLUTION INTRAVENOUS at 09:31

## 2022-06-07 RX ADMIN — FENTANYL CITRATE 50 MCG: 50 INJECTION, SOLUTION INTRAMUSCULAR; INTRAVENOUS at 13:02

## 2022-06-07 RX ADMIN — ACETAMINOPHEN 650 MG: 325 TABLET ORAL at 16:16

## 2022-06-07 RX ADMIN — SODIUM CHLORIDE: 9 INJECTION, SOLUTION INTRAVENOUS at 16:15

## 2022-06-07 RX ADMIN — SODIUM CHLORIDE, PRESERVATIVE FREE 10 ML: 5 INJECTION INTRAVENOUS at 20:34

## 2022-06-07 RX ADMIN — ROCURONIUM BROMIDE 40 MG: 10 INJECTION, SOLUTION INTRAVENOUS at 13:14

## 2022-06-07 RX ADMIN — DEXMEDETOMIDINE HYDROCHLORIDE 8 MCG: 100 INJECTION, SOLUTION INTRAVENOUS at 13:41

## 2022-06-07 RX ADMIN — PIPERACILLIN AND TAZOBACTAM 3375 MG: 3; .375 INJECTION, POWDER, LYOPHILIZED, FOR SOLUTION INTRAVENOUS at 18:01

## 2022-06-07 RX ADMIN — DEXMEDETOMIDINE HYDROCHLORIDE 8 MCG: 100 INJECTION, SOLUTION INTRAVENOUS at 13:52

## 2022-06-07 RX ADMIN — PROPOFOL 250 MG: 10 INJECTION, EMULSION INTRAVENOUS at 13:04

## 2022-06-07 RX ADMIN — ROCURONIUM BROMIDE 10 MG: 10 INJECTION, SOLUTION INTRAVENOUS at 13:34

## 2022-06-07 RX ADMIN — OXYCODONE 10 MG: 5 TABLET ORAL at 19:35

## 2022-06-07 RX ADMIN — FENTANYL CITRATE 50 MCG: 50 INJECTION, SOLUTION INTRAMUSCULAR; INTRAVENOUS at 13:52

## 2022-06-07 RX ADMIN — MORPHINE SULFATE 4 MG: 4 INJECTION INTRAVENOUS at 11:59

## 2022-06-07 RX ADMIN — DEXMEDETOMIDINE HYDROCHLORIDE 8 MCG: 100 INJECTION, SOLUTION INTRAVENOUS at 13:27

## 2022-06-07 RX ADMIN — ONDANSETRON 4 MG: 2 INJECTION INTRAMUSCULAR; INTRAVENOUS at 19:37

## 2022-06-07 RX ADMIN — SUGAMMADEX 200 MG: 100 INJECTION, SOLUTION INTRAVENOUS at 13:48

## 2022-06-07 RX ADMIN — MIDAZOLAM 2 MG: 1 INJECTION INTRAMUSCULAR; INTRAVENOUS at 12:59

## 2022-06-07 RX ADMIN — PIPERACILLIN AND TAZOBACTAM 3375 MG: 3; .375 INJECTION, POWDER, LYOPHILIZED, FOR SOLUTION INTRAVENOUS at 11:18

## 2022-06-07 RX ADMIN — OXYCODONE 10 MG: 5 TABLET ORAL at 23:17

## 2022-06-07 RX ADMIN — KETOROLAC TROMETHAMINE 30 MG: 30 INJECTION, SOLUTION INTRAMUSCULAR; INTRAVENOUS at 23:17

## 2022-06-07 RX ADMIN — DEXAMETHASONE SODIUM PHOSPHATE 4 MG: 4 INJECTION, SOLUTION INTRAMUSCULAR; INTRAVENOUS at 13:14

## 2022-06-07 RX ADMIN — KETOROLAC TROMETHAMINE 30 MG: 30 INJECTION, SOLUTION INTRAMUSCULAR; INTRAVENOUS at 16:15

## 2022-06-07 RX ADMIN — ACETAMINOPHEN 650 MG: 325 TABLET ORAL at 23:16

## 2022-06-07 RX ADMIN — Medication 20 MG: at 13:02

## 2022-06-07 RX ADMIN — KETOROLAC TROMETHAMINE 15 MG: 60 INJECTION, SOLUTION INTRAMUSCULAR at 13:48

## 2022-06-07 RX ADMIN — Medication 10 MG: at 13:28

## 2022-06-07 RX ADMIN — Medication 180 MG: at 13:04

## 2022-06-07 RX ADMIN — ONDANSETRON 4 MG: 2 INJECTION INTRAMUSCULAR; INTRAVENOUS at 14:26

## 2022-06-07 RX ADMIN — ONDANSETRON 4 MG: 2 INJECTION INTRAMUSCULAR; INTRAVENOUS at 11:59

## 2022-06-07 RX ADMIN — ONDANSETRON 4 MG: 2 INJECTION INTRAMUSCULAR; INTRAVENOUS at 13:04

## 2022-06-07 RX ADMIN — SODIUM CHLORIDE, POTASSIUM CHLORIDE, SODIUM LACTATE AND CALCIUM CHLORIDE: 600; 310; 30; 20 INJECTION, SOLUTION INTRAVENOUS at 12:59

## 2022-06-07 RX ADMIN — HYDROMORPHONE HYDROCHLORIDE 0.5 MG: 2 INJECTION, SOLUTION INTRAMUSCULAR; INTRAVENOUS; SUBCUTANEOUS at 14:28

## 2022-06-07 ASSESSMENT — PAIN SCALES - GENERAL
PAINLEVEL_OUTOF10: 8
PAINLEVEL_OUTOF10: 4
PAINLEVEL_OUTOF10: 0
PAINLEVEL_OUTOF10: 0
PAINLEVEL_OUTOF10: 7
PAINLEVEL_OUTOF10: 8
PAINLEVEL_OUTOF10: 5
PAINLEVEL_OUTOF10: 0
PAINLEVEL_OUTOF10: 7

## 2022-06-07 ASSESSMENT — LIFESTYLE VARIABLES
HOW OFTEN DO YOU HAVE A DRINK CONTAINING ALCOHOL: 2-3 TIMES A WEEK
SMOKING_STATUS: 1

## 2022-06-07 ASSESSMENT — PAIN DESCRIPTION - DESCRIPTORS
DESCRIPTORS: SORE;ACHING;TENDER
DESCRIPTORS: SHARP

## 2022-06-07 ASSESSMENT — PAIN DESCRIPTION - LOCATION
LOCATION: ABDOMEN

## 2022-06-07 ASSESSMENT — PAIN - FUNCTIONAL ASSESSMENT
PAIN_FUNCTIONAL_ASSESSMENT: 0-10
PAIN_FUNCTIONAL_ASSESSMENT: 0-10

## 2022-06-07 ASSESSMENT — PAIN DESCRIPTION - ONSET: ONSET: ON-GOING

## 2022-06-07 ASSESSMENT — PAIN DESCRIPTION - PAIN TYPE
TYPE: SURGICAL PAIN
TYPE: SURGICAL PAIN

## 2022-06-07 ASSESSMENT — PAIN DESCRIPTION - FREQUENCY: FREQUENCY: CONTINUOUS

## 2022-06-07 ASSESSMENT — PAIN DESCRIPTION - ORIENTATION: ORIENTATION: MID

## 2022-06-07 NOTE — ED NOTES
OR consent signed. Patient getting undressed and taking off jewelry to prepare. . slipper socks given to patient     Aram Roach RN  06/07/22 3893

## 2022-06-07 NOTE — PROGRESS NOTES
Incentive Spirometry education and demonstration completed by Respiratory Therapy Yes      Response to education: Excellent     Teaching Time: 15 minutes    Minimum Predicted Vital Capacity - 570 mL. Patient's Actual Vital Capacity - 1000 mL. Turning over to Nursing for routine follow-up Yes.     Comments: Is goal met    Electronically signed by Daniel Lyon RCP on 6/7/2022 at 5:02 PM

## 2022-06-07 NOTE — ANESTHESIA PRE PROCEDURE
Department of Anesthesiology  Preprocedure Note       Name:  Stu Willis   Age:  28 y.o.  :  1990                                          MRN:  [id number]         Date:  2022      Surgeon: Chevy Morris):  Marilynn Andersen MD    Procedure: Procedure(s):  LAPAROSCOPIC APPENDECTOMY    Medications prior to admission:   Prior to Admission medications    Medication Sig Start Date End Date Taking? Authorizing Provider   traZODone (DESYREL) 50 MG tablet Take 1.5 tab to 2 tab PO qhs prn sleep  Patient not taking: Reported on 2022   KJ Shin CNP   venlafaxine 75 MG extended release tablet Take 1 tablet by mouth daily Start after completion of 37.5mg daily dose caps. Patient not taking: Reported on 2022   KJ Shin CNP   omeprazole (PRILOSEC) 20 MG delayed release capsule TAKE 1 CAPSULE BY MOUTH 2 TIMES DAILY (BEFORE MEALS)  Patient not taking: Reported on 2022   KJ Shin CNP   ondansetron (ZOFRAN ODT) 4 MG disintegrating tablet Take 1 tablet by mouth every 8 hours as needed for Nausea  Patient not taking: Reported on 2022   KJ Shin CNP   famotidine (PEPCID) 20 MG tablet Take 1 tablet by mouth 2 times daily  Patient not taking: Reported on 2022   Lexie Monroe PA-C   SUMAtriptan (IMITREX) 25 MG tablet Take 1 tablet at once of migraine. Repeat dose after two hours if headache relief is partially effective or headache recurs.   Patient not taking: Reported on 2021 10/14/20   Fayleerika Callahan APRJADE - CNP   ondansetron (ZOFRAN) 8 MG tablet Take 1 tablet by mouth every 8 hours as needed for Nausea  Patient not taking: Reported on 2021 10/14/20   Noelylene Prim APRN - CNP       Current medications:    Current Facility-Administered Medications   Medication Dose Route Frequency Provider Last Rate Last Admin    piperacillin-tazobactam (ZOSYN) 3,375 mg in dextrose 5 % 50 mL IVPB (mini-bag)  3,375 mg IntraVENous Q6H Soheilahniiortiz London MD   Stopped at 06/07/22 1210    morphine (PF) injection 2 mg  2 mg IntraVENous Q2H PRN Rodrick Millin, APRN - CNP        Or    morphine injection 4 mg  4 mg IntraVENous Q2H PRN Rodrick Millin, APRN - CNP   4 mg at 06/07/22 1159    ondansetron (ZOFRAN) injection 4 mg  4 mg IntraVENous Q6H PRN Rodrick Millin, APRN - CNP   4 mg at 06/07/22 1159    0.9 % sodium chloride infusion   IntraVENous Continuous Rodrick Millin, APRN - CNP           Allergies:  No Known Allergies    Problem List:    Patient Active Problem List   Diagnosis Code    Morbidly obese ([facility] E66.01    Depressive disorder F32.9    Moderate episode of recurrent major depressive disorder ([facility] F33.1    Acute appendicitis with localized peritonitis, without perforation, abscess, or gangrene K35.30       Past Medical History:        Diagnosis Date    Postpartum depression 2015 & 2016    Psychiatric problem     [facility] ([facility] 2015    overdose on depression medication    UTI (urinary tract infection) 06/09/2016       Past Surgical History:        Procedure Laterality Date    CHOLECYSTECTOMY      TUBAL LIGATION  2013    TYMPANOSTOMY TUBE PLACEMENT Bilateral     as a child       Social History:    Social History     Tobacco Use    Smoking status: Current Every Day Smoker     Packs/day: 0.10     Years: 5.00     Pack years: 0.50     Types: Cigarettes     Start date: 2014    Smokeless tobacco: Never Used    Tobacco comment: cessation 7/16   Substance Use Topics    Alcohol use:  Yes     Alcohol/week: 2.0 standard drinks     Types: 2 Glasses of wine per week     Comment: Social                                Ready to quit: Not Answered  Counseling given: Not Answered  Comment: cessation 7/16      Vital Signs (Current):   Vitals:    06/07/22 0815 06/07/22 1100 06/07/22 1201 06/07/22 1202   BP: 117/80 (!) 119/91 108/63    Pulse: 83      Resp: 16      Temp: 98.6 °F (37 °C) TempSrc: Oral      SpO2: 99% 100%  98%   Weight: (!) 321 lb 3.2 oz (145.7 kg)      Height: 5' 4\" (1.626 m)                                                 BP Readings from Last 3 Encounters:   06/07/22 108/63   06/17/21 118/76   04/22/21 110/70       NPO Status:                                                                                 BMI:   Wt Readings from Last 3 Encounters:   06/07/22 (!) 321 lb 3.2 oz (145.7 kg)   06/17/21 (!) 331 lb (150.1 kg)   04/22/21 (!) 330 lb (149.7 kg)     Body mass index is 55.13 kg/m². CBC:   Lab Results   Component Value Date    WBC 16.5 06/07/2022    RBC 4.13 06/07/2022    HGB 13.4 06/07/2022    HCT 43.3 06/07/2022    .8 06/07/2022    RDW 17.9 06/07/2022     06/07/2022       CMP:   Lab Results   Component Value Date     06/07/2022    K 4.3 06/07/2022     06/07/2022    CO2 17 06/07/2022    BUN 8 06/07/2022    CREATININE 0.6 06/07/2022    GFRAA >60 06/07/2022    GFRAA >60 04/21/2013    AGRATIO 1.2 06/07/2022    LABGLOM >60 06/07/2022    GLUCOSE 134 06/07/2022    PROT 6.9 06/07/2022    CALCIUM 8.9 06/07/2022    BILITOT 0.4 06/07/2022    ALKPHOS 71 06/07/2022    AST 17 06/07/2022    ALT 12 06/07/2022       POC Tests: No results for input(s): POCGLU, POCNA, POCK, POCCL, POCBUN, POCHEMO, POCHCT in the last 72 hours.     Coags:   Lab Results   Component Value Date    PROTIME 11.7 07/09/2019    INR 1.03 07/09/2019    APTT 33.4 07/09/2019       HCG (If Applicable):   Lab Results   Component Value Date    PREGTESTUR Negative 06/07/2022        ABGs: No results found for: PHART, PO2ART, LLX3ABG, DKO2GIW, BEART, S5EDUSEY     Type & Screen (If Applicable):  No results found for: LABABO, LABRH    Drug/Infectious Status (If Applicable):  No results found for: HIV, HEPCAB    COVID-19 Screening (If Applicable):   Lab Results   Component Value Date    COVID19 Not Detected 05/05/2020           Anesthesia Evaluation  Patient summary reviewed and Nursing notes reviewed

## 2022-06-07 NOTE — ED NOTES
Spoke with Christina Morrison from PACU about patient.   Surgery scheduled at [id number] [facility]  06/07/22 1117

## 2022-06-07 NOTE — ED PROVIDER NOTES
Primary Care Physician: KJ Aragon - CNP   Attending Physician: Lauren Aguilera MD     History   Chief Complaint   Patient presents with    Abdominal Pain     Central and RLQ abdominal pain, nausea/vomiting onset last evening. \"Woke up with bruises on me\"    Nausea & Vomiting        HPI   Shade Phelps  is a 28 y.o. female history of morbid obesity who presents complaining of abdominal pain that started last night and persisted this morning forcing her to seek care. She stated that the pain started around the umbilicus and radiated down to the right lower quadrant. This morning she had associated nausea vomiting. Stated that she is never had pain like this in the past.  Denies any fevers but admits to chills without dysuria. No vaginal bleeding or discharge.     Past Medical History:   Diagnosis Date    Postpartum depression 2015 & 2016    Psychiatric problem     Suicide attempt [facility]) 2015    overdose on depression medication    UTI (urinary tract infection) 06/09/2016        Past Surgical History:   Procedure Laterality Date    CHOLECYSTECTOMY      TUBAL LIGATION  2013    TYMPANOSTOMY TUBE PLACEMENT Bilateral     as a child        Family History   Problem Relation Age of Onset    No Known Problems Mother     Mental Illness Father         PTSD- [employer]    ADHD Brother     No Known Problems Maternal Aunt     No Known Problems Maternal Uncle     No Known Problems Paternal Aunt     No Known Problems Paternal Uncle     Diabetes Maternal Grandmother     Hypertension Maternal Grandmother     Glaucoma Maternal Grandfather     No Known Problems Paternal Grandmother     No Known Problems Paternal Grandfather     No Known Problems Other     Rheum Arthritis Neg Hx     Osteoarthritis Neg Hx     Asthma Neg Hx     Breast Cancer Neg Hx     Cancer Neg Hx     Heart Failure Neg Hx     High Cholesterol Neg Hx     Migraines Neg Hx     Ovarian Cancer Neg Hx     Rashes/Skin Not on file    Emotionally Abused: Not on file    Physically Abused: Not on file    Sexually Abused: Not on file   Housing Stability:     Unable to Pay for Housing in the Last Year: Not on file    Number of Daniel in the Last Year: Not on file    Unstable Housing in the Last Year: Not on file        Review of Systems   10 total systems reviewed and found to be negative unless otherwise noted in HPI     Physical Exam   /80   Pulse 83   Temp 98.6 °F (37 °C) (Oral)   Resp 16   Ht 5' 4\" (1.626 m)   Wt (!) 321 lb 3.2 oz (145.7 kg)   LMP 05/16/2022 (Approximate)   SpO2 99%   BMI 55.13 kg/m²      CONSTITUTIONAL: Well appearing, in no acute distress   HEAD: atraumatic, normocephalic   EYES: PERRL, No injection, discharge or scleral icterus. ENT: Moist mucous membranes. NECK: Normal ROM, NO LAD   CARDIOVASCULAR: Regular rate and rhythm. No murmurs or gallop. PULMONARY/CHEST: Airway patent. No retractions. Breath sounds clear with good air entry bilaterally. ABDOMEN: Soft, Non-distended but RLQ tenderness, without guarding or rebound. SKIN: Acyanotic, warm, dry   MUSCULOSKELETAL: No swelling, tenderness or deformity   NEUROLOGICAL: Awake and oriented x 3. Pulses intact. Grossly nonfocal   Nursing note and vitals reviewed.      ED Course & Medical Decision Making   Medications   piperacillin-tazobactam (ZOSYN) 3,375 mg in dextrose 5 % 50 mL IVPB (mini-bag) (has no administration in time range)   iopamidol (ISOVUE-370) 76 % injection 75 mL (75 mLs IntraVENous Given 6/7/22 0931)      Labs Reviewed   CBC WITH AUTO DIFFERENTIAL - Abnormal; Notable for the following components:       Result Value    WBC 16.5 (*)     .8 (*)     MCHC 30.9 (*)     RDW 17.9 (*)     Neutrophils Absolute 15.1 (*)     All other components within normal limits   COMPREHENSIVE METABOLIC PANEL W/ REFLEX TO MG FOR LOW K - Abnormal; Notable for the following components:    CO2 17 (*)     Glucose 134 (*)     All other components within normal limits   LIPASE - Abnormal; Notable for the following components:    Lipase 12.0 (*)     All other components within normal limits   URINALYSIS WITH REFLEX TO CULTURE - Abnormal; Notable for the following components:    Ketones, Urine 15 (*)     Protein, UA 30 (*)     Leukocyte Esterase, Urine MODERATE (*)     All other components within normal limits   MICROSCOPIC URINALYSIS - Abnormal; Notable for the following components:    Bacteria, UA 2+ (*)     WBC, UA 24 (*)     Epithelial Cells, UA 9 (*)     All other components within normal limits   CULTURE, URINE   PREGNANCY, URINE      CT ABDOMEN PELVIS W IV CONTRAST Additional Contrast? None   Final Result   1. Uncomplicated acute appendicitis. 2. Nonobstructing left nephrolithiasis. RECOMMENDATIONS:   Unavailable            CT ABDOMEN PELVIS W IV CONTRAST Additional Contrast? None    Result Date: 6/7/2022  EXAMINATION: CT OF THE ABDOMEN AND PELVIS WITH CONTRAST 6/7/2022 9:33 am TECHNIQUE: CT of the abdomen and pelvis was performed with the administration of intravenous contrast. Multiplanar reformatted images are provided for review. Automated exposure control, iterative reconstruction, and/or weight based adjustment of the mA/kV was utilized to reduce the radiation dose to as low as reasonably achievable. COMPARISON: None. HISTORY: ORDERING SYSTEM PROVIDED HISTORY: RLQ pain TECHNOLOGIST PROVIDED HISTORY: Additional Contrast?->None Reason for exam:->RLQ pain Decision Support Exception - unselect if not a suspected or confirmed emergency medical condition->Emergency Medical Condition (MA) Reason for Exam: RLQ pain FINDINGS: Lower Chest: There is no consolidation or effusion. Organs: The liver, pancreas, spleen, kidneys and adrenals are unremarkable aside from a nonobstructing 5 mm left intrarenal calculus. Postop changes of cholecystectomy are noted. GI/Bowel: The appendix is enlarged and fluid-filled, measuring 9 mm in diameter.   There is no bowel dilatation or obstruction. Pelvis: The bladder and pelvic organs are unremarkable. Peritoneum/Retroperitoneum: Mild inflammatory changes surround the aforementioned dilated appendix. A trace amount of free fluid is present within the cul-de-sac. There is no free air or adenopathy. Note is made of a retroaortic left renal vein. Bones/Soft Tissues: There is no acute fracture or aggressive osseous lesion. 1. Uncomplicated acute appendicitis. 2. Nonobstructing left nephrolithiasis. RECOMMENDATIONS: Unavailable        PROCEDURES:   Procedures    ASSESSMENT AND PLAN:  [id number], Alejandro Rasmussen is a 28 y.o. female who presents with right lower quadrant pain. On exam patient appears nontoxic in no acute distress but tender to palpation right lower quadrant. Vitals within normal limits. Labs obtained with leukocytosis 16. Given her presentation was concern for appendicitis I did obtain a CT scan which showed an uncomplicated appendicitis. Patient started on Zosyn and surgery consulted for richar richardson. ClINICAL IMPRESSION:  1. Acute appendicitis with localized peritonitis, without perforation, abscess, or gangrene        DISPOSITION Decision To Admit 06/07/2022 10:08:52 AM    ___________________________________________________________________________________  _________________________________________________________________________________________  This record is transcribed utilizing voice recognition technology. There are inherent limitations in this technology. In addition, there may be limitations in editing of this report. If there are any discrepancies, please contact me directly.         Howard Lyn MD  06/07/22 1223

## 2022-06-07 NOTE — BRIEF OP NOTE
[address] and Laparoscopic Surgery  Brief Operative Note  Pt Name: Hank Reynolds  CSN: [id number]  YOB: 1990    Date of Procedure: 6/7/2022    Pre-operative Diagnosis: Acute appendicitis    Post-operative Diagnosis: same     Procedure: Laparoscopic appendectomy    Surgeon(s):  Ijeoma Byrd MD     Surgical Assistant: Omi Barnhart    Anesthesia:  General anesthesia    Findings: Full note dictated, Dictation Job Number: [id number]    Estimated Blood Loss: less than 50  ml    Complications: None    Specimens: appendix  ID Type Source Tests Collected by Time Destination   A :  Tissue Appendix SURGICAL PATHOLOGY Ijeoma Byrd MD 6/7/2022 1348       Implants:  * No implants in log *    Drains: none   Urinary Catheter 2 Way (Active)       Condition: stable     Disposition and Post-operative plan: PACU, med/surg pearson     Rick Ibrahim MD, FACS  6/7/2022  1:49 PM

## 2022-06-07 NOTE — PROGRESS NOTES
Pt resting in bed with eyes closed- responds to voice. VSS, on RA. x3 surgical incisions to abdomen CDI. Pt states pain is better and denies nausea. Phase one criteria met, awaiting bed on 4T.  Will continue to monitor

## 2022-06-07 NOTE — H&P
[facility]        History and Physical    Patient Name: Hank Reynolds  MRN: [id number]  YOB: 1990  Admission Date: 2022  8:11 AM   Date of Evaluation: 2022  Primary Care Physician: KJ Kowalski - CNP  Chief Complaint: Abdominal pain    SUBJECTIVE    History of Present Illness:  Ms. Aaliyah Anand is a 28 y.o. female who presents with a 1-day history of sudden-onset, sharp periumbilical pain that has been constant and progressive since onset, severe on admission. As time has passed the pain has migrated into the right lower quadrant and radiates through to the back, felt like pressure. The pain began yesterday evening while doing light cleaning around her house; initially thought was gas pain. No alleviating factors; pain is worse when stretched out/lying flat or with any movement. Associated chills, sweats, nausea, vomiting, and chest pain/SOB with severe pain. She reports constipation at baseline; last BM was yesterday prior to symptom onset. She denies known fevers, diarrhea, hematochezia, melena, urinary symptoms, or similar symptoms in the past.  Prior abdominal surgery includes cholecystectomy, 2  sections, and a tubal ligation. Medical history includes depression. No prior colonoscopy. No blood thinners. Current smoker. Past Medical History:      Diagnosis Date    Postpartum depression 2015 & 2016    Psychiatric problem     Suicide attempt [facility]) 2015    overdose on depression medication    UTI (urinary tract infection) 2016       Past Surgical History:      Procedure Laterality Date    CHOLECYSTECTOMY      TUBAL LIGATION  2013    TYMPANOSTOMY TUBE PLACEMENT Bilateral     as a child       Scheduled Meds:   piperacillin-tazobactam  3,375 mg IntraVENous Q6H     Continuous Infusions:  PRN Meds:. Prior to Admission medications    Medication Sig Start Date End Date Taking?  Authorizing Provider traZODone (DESYREL) 50 MG tablet Take 1.5 tab to 2 tab PO qhs prn sleep  Patient not taking: Reported on 6/7/2022 6/17/21   KJ Agrawal CNP   venlafaxine 75 MG extended release tablet Take 1 tablet by mouth daily Start after completion of 37.5mg daily dose caps. Patient not taking: Reported on 6/7/2022 6/17/21   KJ Agrawal CNP   omeprazole (PRILOSEC) 20 MG delayed release capsule TAKE 1 CAPSULE BY MOUTH 2 TIMES DAILY (BEFORE MEALS)  Patient not taking: Reported on 6/7/2022 5/17/21   KJ Agrawal CNP   ondansetron (ZOFRAN ODT) 4 MG disintegrating tablet Take 1 tablet by mouth every 8 hours as needed for Nausea  Patient not taking: Reported on 6/7/2022 4/22/21   KJ Agrawal CNP   famotidine (PEPCID) 20 MG tablet Take 1 tablet by mouth 2 times daily  Patient not taking: Reported on 6/7/2022 1/12/21   Blayne Duran PA-C   SUMAtriptan (IMITREX) 25 MG tablet Take 1 tablet at once of migraine. Repeat dose after two hours if headache relief is partially effective or headache recurs. Patient not taking: Reported on 4/22/2021 10/14/20   KJ Gasca CNP   ondansetron (ZOFRAN) 8 MG tablet Take 1 tablet by mouth every 8 hours as needed for Nausea  Patient not taking: Reported on 4/22/2021 10/14/20   KJ Gasca CNP        Allergies:  Patient has no known allergies. Social History:  Social History     Socioeconomic History    Marital status: Single     Spouse name: None    Number of children: 2    Years of education: 15    Highest education level: None   Occupational History    Occupation:    Tobacco Use    Smoking status: Current Every Day Smoker     Packs/day: 0.10     Years: 5.00     Pack years: 0.50     Types: Cigarettes     Start date: 2014    Smokeless tobacco: Never Used    Tobacco comment: cessation 7/16   Vaping Use    Vaping Use: Never used   Substance and Sexual Activity    Alcohol use:  Yes Alcohol/week: 2.0 standard drinks     Types: 2 Glasses of wine per week     Comment: Social    Drug use: Yes     Types: Marijuana Norval Remak)     Comment: smokes 15 joints/ week    Sexual activity: Yes     Partners: Male, Female     Birth control/protection: Condom   Other Topics Concern    None   Social History Narrative    None     Social Determinants of Health     Financial Resource Strain:     Difficulty of Paying Living Expenses: Not on file   Food Insecurity:     Worried About Running Out of Food in the Last Year: Not on file    Preeti of Food in the Last Year: Not on file   Transportation Needs:     Lack of Transportation (Medical): Not on file    Lack of Transportation (Non-Medical):  Not on file   Physical Activity:     Days of Exercise per Week: Not on file    Minutes of Exercise per Session: Not on file   Stress:     Feeling of Stress : Not on file   Social Connections:     Frequency of Communication with Friends and Family: Not on file    Frequency of Social Gatherings with Friends and Family: Not on file    Attends [employer] Services: Not on file    Active Member of [address] [place] [address] or Organizations: Not on file    Attends Club or Organization Meetings: Not on file    Marital Status: Not on file   Intimate Partner Violence:     Fear of Current or Ex-Partner: Not on file    Emotionally Abused: Not on file    Physically Abused: Not on file    Sexually Abused: Not on file   Housing Stability:     Unable to Pay for Housing in the Last Year: Not on file    Number of [place] in the Last Year: Not on file    Unstable Housing in the Last Year: Not on file       Family History:    Family History   Problem Relation Age of Onset    No Known Problems Mother     Mental Illness Father         PTSD- [employer]    ADHD Brother     No Known Problems Maternal Aunt     No Known Problems Maternal Uncle     No Known Problems Paternal Aunt     No Known Problems Paternal Uncle     Diabetes Maternal Grandmother     Hypertension Maternal Grandmother     Glaucoma Maternal Grandfather     No Known Problems Paternal Grandmother     No Known Problems Paternal Grandfather     No Known Problems Other     Rheum Arthritis Neg Hx     Osteoarthritis Neg Hx     Asthma Neg Hx     Breast Cancer Neg Hx     Cancer Neg Hx     Heart Failure Neg Hx     High Cholesterol Neg Hx     Migraines Neg Hx     Ovarian Cancer Neg Hx     Rashes/Skin Problems Neg Hx     Seizures Neg Hx     Stroke Neg Hx     Thyroid Disease Neg Hx        Review of Systems:  CONSTITUTIONAL:  positive for  chills and sweats  HEENT:  negative  RESPIRATORY: negative except for chest pain/SOB with severe pain  CARDIOVASCULAR: negative except for chest pain/SOB with severe pain  GASTROINTESTINAL:  negative except for nausea, vomiting and abdominal pain  GENITOURINARY:  negative  HEMATOLOGIC/LYMPHATIC:  negative  NEUROLOGICAL:  negative  SKIN:  negative      OBJECTIVE    Vital Signs:  Patient Vitals for the past 24 hrs:   BP Temp Temp src Pulse Resp SpO2 Height Weight   22 0815 117/80 98.6 °F (37 °C) Oral 83 16 99 % 5' 4\" (1.626 m) (!) 321 lb 3.2 oz (145.7 kg)      TEMPERATURE HISTORY 24H: Temp (24hrs), Av.6 °F (37 °C), Min:98.6 °F (37 °C), Max:98.6 °F (37 °C)    BLOOD PRESSURE HISTORY: Systolic (66GHL), EHS:089 , Min:117 , UGQ:823    Diastolic (55DPL), RWJ:02, Min:80, Max:80    Admission Weight: (!) 321 lb 3.2 oz (145.7 kg)     No intake or output data in the 24 hours ending 22 1017  Drain/tube Output:         Physical Exam:  CONSTITUTIONAL:  alert, no apparent distress and morbidly obese  NEUROLOGIC:  Mental Status Exam:  Level of Alertness:   awake  Orientation:   person, place, time  EYES:  sclera clear  ENT:  normocepalic, without obvious abnormality  NECK:  supple, symmetrical, trachea midline  LUNGS:  clear to auscultation  CARDIOVASCULAR:  regular rate and rhythm and no murmur noted  ABDOMEN: soft, non-distended, tenderness CONTRAST Additional Contrast? None    Result Date: 6/7/2022  EXAMINATION: CT OF THE ABDOMEN AND PELVIS WITH CONTRAST 6/7/2022 9:33 am TECHNIQUE: CT of the abdomen and pelvis was performed with the administration of intravenous contrast. Multiplanar reformatted images are provided for review. Automated exposure control, iterative reconstruction, and/or weight based adjustment of the mA/kV was utilized to reduce the radiation dose to as low as reasonably achievable. COMPARISON: None. HISTORY: ORDERING SYSTEM PROVIDED HISTORY: RLQ pain TECHNOLOGIST PROVIDED HISTORY: Additional Contrast?->None Reason for exam:->RLQ pain Decision Support Exception - unselect if not a suspected or confirmed emergency medical condition->Emergency Medical Condition (MA) Reason for Exam: RLQ pain FINDINGS: Lower Chest: There is no consolidation or effusion. Organs: The liver, pancreas, spleen, kidneys and adrenals are unremarkable aside from a nonobstructing 5 mm left intrarenal calculus. Postop changes of cholecystectomy are noted. GI/Bowel: The appendix is enlarged and fluid-filled, measuring 9 mm in diameter. There is no bowel dilatation or obstruction. Pelvis: The bladder and pelvic organs are unremarkable. Peritoneum/Retroperitoneum: Mild inflammatory changes surround the aforementioned dilated appendix. A trace amount of free fluid is present within the cul-de-sac. There is no free air or adenopathy. Note is made of a retroaortic left renal vein. Bones/Soft Tissues: There is no acute fracture or aggressive osseous lesion. 1. Uncomplicated acute appendicitis. 2. Nonobstructing left nephrolithiasis. RECOMMENDATIONS: Unavailable       Scheduled Meds:   piperacillin-tazobactam  3,375 mg IntraVENous Q6H     Continuous Infusions:  PRN Meds:. ASSESSMENT:  28 y.o. female admitted with    Acute appendicitis  Depression  Morbid obesity, BMI 55.13      PLAN:  1. Laparoscopic appendectomy today with Dr. Funmilayo Alexander  2.  NPO  3. IV related to appendicitis. Alternatives include observation with medical management. Details of the procedure were discussed and all questions answered. The patient understands, agrees, and wishes to proceed. She will be taken to the OR this afternoon  2. NPO for procedure  3. IVF resuscitation  4. Antibiotics  5. Pain medication and antiemetics as needed  6. If non-perforated appendix encountered and patient's symptoms improve, could potentially be discharged tomorrow. Otherwise will need continued hospital admission for pain control and antibiotics    This plan was discussed at length with the patient. She was understanding and in agreement with the plan    Rick Moss MD, FACS  6/7/2022  12:39 PM

## 2022-06-07 NOTE — PROGRESS NOTES
Pt to PACU from OR, arouses to pain. VSS. x3 surgical incisions to abdomen CDI, ice applied.  Will continue to monitor

## 2022-06-07 NOTE — ANESTHESIA POSTPROCEDURE EVALUATION
Department of Anesthesiology  Postprocedure Note    Patient: Kath Narayanan  MRN: [id number]  YOB: 1990  Date of evaluation: 6/7/2022  Time:  2:10 PM     Procedure Summary     Date: 06/07/22 Room / Location: [address]    Anesthesia Start: 1259 Anesthesia Stop: 2616    Procedure: LAPAROSCOPIC APPENDECTOMY (N/A Abdomen) Diagnosis:       Appendicitis, unspecified appendicitis type      (Appendicitis)    Surgeons: Angel Villalobos MD Responsible Provider: Benny Garcias MD    Anesthesia Type: general ASA Status: 3          Anesthesia Type: No value filed. Charu Phase I: Charu Score: 8    Charu Phase II:      Last vitals: Reviewed and per EMR flowsheets.        Anesthesia Post Evaluation    Patient location during evaluation: PACU  Patient participation: complete - patient participated  Level of consciousness: awake and alert  Airway patency: patent  Nausea & Vomiting: no vomiting and no nausea  Complications: no  Cardiovascular status: hemodynamically stable  Respiratory status: acceptable  Hydration status: stable

## 2022-06-08 VITALS
RESPIRATION RATE: 16 BRPM | TEMPERATURE: 98 F | HEART RATE: 63 BPM | HEIGHT: 64 IN | WEIGHT: 293 LBS | BODY MASS INDEX: 50.02 KG/M2 | SYSTOLIC BLOOD PRESSURE: 133 MMHG | DIASTOLIC BLOOD PRESSURE: 81 MMHG | OXYGEN SATURATION: 96 %

## 2022-06-08 LAB
ANION GAP SERPL CALCULATED.3IONS-SCNC: 9 MMOL/L (ref 3–16)
BASOPHILS ABSOLUTE: 0 K/UL (ref 0–0.2)
BASOPHILS RELATIVE PERCENT: 0.1 %
BUN BLDV-MCNC: 11 MG/DL (ref 7–20)
CALCIUM SERPL-MCNC: 8.7 MG/DL (ref 8.3–10.6)
CHLORIDE BLD-SCNC: 105 MMOL/L (ref 99–110)
CO2: 25 MMOL/L (ref 21–32)
CREAT SERPL-MCNC: 0.8 MG/DL (ref 0.6–1.1)
EOSINOPHILS ABSOLUTE: 0 K/UL (ref 0–0.6)
EOSINOPHILS RELATIVE PERCENT: 0.1 %
GFR AFRICAN AMERICAN: >60
GFR NON-AFRICAN AMERICAN: >60
GLUCOSE BLD-MCNC: 114 MG/DL (ref 70–99)
HCT VFR BLD CALC: 36.9 % (ref 36–48)
HEMOGLOBIN: 12 G/DL (ref 12–16)
LYMPHOCYTES ABSOLUTE: 1.4 K/UL (ref 1–5.1)
LYMPHOCYTES RELATIVE PERCENT: 10.3 %
MCH RBC QN AUTO: 31.8 PG (ref 26–34)
MCHC RBC AUTO-ENTMCNC: 32.4 G/DL (ref 31–36)
MCV RBC AUTO: 98.1 FL (ref 80–100)
MONOCYTES ABSOLUTE: 0.7 K/UL (ref 0–1.3)
MONOCYTES RELATIVE PERCENT: 5.1 %
NEUTROPHILS ABSOLUTE: 11.8 K/UL (ref 1.7–7.7)
NEUTROPHILS RELATIVE PERCENT: 84.4 %
PDW BLD-RTO: 14.1 % (ref 12.4–15.4)
PLATELET # BLD: 256 K/UL (ref 135–450)
PMV BLD AUTO: 8.6 FL (ref 5–10.5)
POTASSIUM SERPL-SCNC: 4.1 MMOL/L (ref 3.5–5.1)
RBC # BLD: 3.76 M/UL (ref 4–5.2)
SODIUM BLD-SCNC: 139 MMOL/L (ref 136–145)
URINE CULTURE, ROUTINE: NORMAL
WBC # BLD: 14 K/UL (ref 4–11)

## 2022-06-08 PROCEDURE — 85025 COMPLETE CBC W/AUTO DIFF WBC: CPT

## 2022-06-08 PROCEDURE — 99024 POSTOP FOLLOW-UP VISIT: CPT | Performed by: SURGERY

## 2022-06-08 PROCEDURE — 6370000000 HC RX 637 (ALT 250 FOR IP): Performed by: SURGERY

## 2022-06-08 PROCEDURE — 96361 HYDRATE IV INFUSION ADD-ON: CPT

## 2022-06-08 PROCEDURE — 96376 TX/PRO/DX INJ SAME DRUG ADON: CPT

## 2022-06-08 PROCEDURE — G0378 HOSPITAL OBSERVATION PER HR: HCPCS

## 2022-06-08 PROCEDURE — 36415 COLL VENOUS BLD VENIPUNCTURE: CPT

## 2022-06-08 PROCEDURE — 80048 BASIC METABOLIC PNL TOTAL CA: CPT

## 2022-06-08 PROCEDURE — 6360000002 HC RX W HCPCS: Performed by: SURGERY

## 2022-06-08 PROCEDURE — 2580000003 HC RX 258: Performed by: SURGERY

## 2022-06-08 RX ORDER — OXYCODONE HYDROCHLORIDE 5 MG/1
5 TABLET ORAL EVERY 6 HOURS PRN
Qty: 15 TABLET | Refills: 0 | Status: SHIPPED | OUTPATIENT
Start: 2022-06-08 | End: 2022-06-15

## 2022-06-08 RX ORDER — OXYCODONE HYDROCHLORIDE 5 MG/1
5 TABLET ORAL EVERY 6 HOURS PRN
Qty: 15 TABLET | Refills: 0 | Status: SHIPPED | OUTPATIENT
Start: 2022-06-08 | End: 2022-06-08 | Stop reason: SDUPTHER

## 2022-06-08 RX ADMIN — KETOROLAC TROMETHAMINE 30 MG: 30 INJECTION, SOLUTION INTRAMUSCULAR; INTRAVENOUS at 05:48

## 2022-06-08 RX ADMIN — KETOROLAC TROMETHAMINE 30 MG: 30 INJECTION, SOLUTION INTRAMUSCULAR; INTRAVENOUS at 11:12

## 2022-06-08 RX ADMIN — OXYCODONE 10 MG: 5 TABLET ORAL at 08:42

## 2022-06-08 RX ADMIN — ACETAMINOPHEN 650 MG: 325 TABLET ORAL at 11:12

## 2022-06-08 RX ADMIN — ACETAMINOPHEN 650 MG: 325 TABLET ORAL at 05:47

## 2022-06-08 RX ADMIN — PIPERACILLIN AND TAZOBACTAM 3375 MG: 3; .375 INJECTION, POWDER, LYOPHILIZED, FOR SOLUTION INTRAVENOUS at 03:29

## 2022-06-08 ASSESSMENT — PAIN SCALES - GENERAL
PAINLEVEL_OUTOF10: 3
PAINLEVEL_OUTOF10: 0
PAINLEVEL_OUTOF10: 8
PAINLEVEL_OUTOF10: 7

## 2022-06-08 ASSESSMENT — PAIN DESCRIPTION - ORIENTATION
ORIENTATION: MID
ORIENTATION: MID

## 2022-06-08 ASSESSMENT — PAIN DESCRIPTION - LOCATION
LOCATION: ABDOMEN
LOCATION: ABDOMEN

## 2022-06-08 NOTE — CARE COORDINATION
Patient admitted as Observation/OPIB with an anticipated short hospitalization length of stay. Chart reviewed and it appears that patient has minimal needs for discharge at this time. Discussed with patients nurse and requested that case management be notified if discharge needs are identified. PCP:KJ Alicia CNP  Insurance: Vahid Dawn. Case management will continue to follow progress and update discharge plan as needed.       Electronically signed by MAC Lala on 6/8/2022 at 8:14 AM

## 2022-06-08 NOTE — DISCHARGE SUMMARY
[place] [address] and Laparoscopic Surgery        Discharge Summary    Patient Name: Eugene Weaver  MRN: [id number]  YOB: 1990  PCP: KJ Rangel CNP  Admission Date: 6/7/2022  Discharge Date: 6/8/2022  Disposition: home  Admitting Diagnosis: Acute appendicitis with localized peritonitis, without perforation, abscess, or gangrene [K35.30]  Acute appendicitis [K35.80]  Discharge Diagnosis:   Patient Active Problem List   Diagnosis    Morbidly obese ([address]    Depressive disorder    Moderate episode of recurrent major depressive disorder ([address]    Acute appendicitis with localized peritonitis, without perforation, abscess, or gangrene    Acute appendicitis      Consultants: IP CONSULT TO GENERAL SURGERY    Procedures/Diagnostic Test(s):  CT ABDOMEN PELVIS W IV CONTRAST Additional Contrast? None   Final Result   1. Uncomplicated acute appendicitis. 2. Nonobstructing left nephrolithiasis. RECOMMENDATIONS:   Unavailable             Discharge Condition: good    HOSPITAL COURSE: Natalie Evangelista originally presented to the hospital on 6/7/2022  8:11 AM with acute appendicitis. She was given IV antibiotics, analgesics, and taken to the operating room for laparoscopic appendectomy. Hospital course was uneventful. Surgical pathology was pending at the time of discharge. At time of discharge, Natalie Evangelista was tolerating a regular diet, had active bowel sounds, ambulating on her own accord and had adequate analgesia on oral pain medications, and had no signs of symptoms of complications. PHYSICAL EXAMINATION:  Discharge Vitals:  height is 5' 4\" (1.626 m) and weight is 321 lb 3.2 oz (145.7 kg) (abnormal). Her oral temperature is 98 °F (36.7 °C). Her blood pressure is 133/81 and her pulse is 63. Her respiration is 16 and oxygen saturation is 96%.    General appearance - alert, well appearing, and in no distress  Chest - clear to ausculation  Heart - normal rate and regular rhythm  Abdomen - soft, non-distended, incisional tenderness only, bowel sounds present  Neurological - motor and sensory grossly normal bilaterally  Musculoskeletal - full range of motion without pain  Extremities - peripheral pulses normal, no pedal edema, no clubbing or cyanosis  Incision: healing well, no drainage, no erythema, well approximated    LABS:  Recent Labs     06/07/22  0847 06/08/22  0459   WBC 16.5* 14.0*   HGB 13.4 12.0   HCT 43.3 36.9    256    139   K 4.3 4.1    105   CO2 17* 25   BUN 8 11   CREATININE 0.6 0.8       DISCHARGE INSTRUCTIONS:  1. Discharge medications:      Medication List      START taking these medications    oxyCODONE 5 MG immediate release tablet  Commonly known as: Roxicodone  Take 1 tablet by mouth every 6 hours as needed for Pain for up to 7 days. Take lowest dose possible to manage pain; may stop taking sooner than 7 days if pain is able to be controlled with non-narcotic analgesics and therapies. CONTINUE taking these medications    famotidine 20 MG tablet  Commonly known as: Pepcid  Take 1 tablet by mouth 2 times daily     omeprazole 20 MG delayed release capsule  Commonly known as: PRILOSEC  TAKE 1 CAPSULE BY MOUTH 2 TIMES DAILY (BEFORE MEALS)     ondansetron 4 MG disintegrating tablet  Commonly known as: Zofran ODT  Take 1 tablet by mouth every 8 hours as needed for Nausea     ondansetron 8 MG tablet  Commonly known as: ZOFRAN  Take 1 tablet by mouth every 8 hours as needed for Nausea     SUMAtriptan 25 MG tablet  Commonly known as: IMITREX  Take 1 tablet at once of migraine. Repeat dose after two hours if headache relief is partially effective or headache recurs. traZODone 50 MG tablet  Commonly known as: DESYREL  Take 1.5 tab to 2 tab PO qhs prn sleep     venlafaxine 75 MG extended release tablet  Take 1 tablet by mouth daily Start after completion of 37.5mg daily dose caps.            Where to Get Your Medications      You can get these medications from any pharmacy    Bring a paper prescription for each of these medications  · oxyCODONE 5 MG immediate release tablet       2. Diet as tolerated. 3. Activity: activity as tolerated, no driving while on analgesics and no heavy lifting for 6 weeks. 4. Wound care: keep incisions clean and dry  5. Follow-up: recommend follow up with PCP in 2-4 weeks. 6. Okay to shower, don't submerge incisions under water. 7. No driving until off pain medication and able to move torso freely without any pain. 8. Return to hospital, or contact Dr. Laila Escobar office ([phone]) if any signs of infection are seen. 9. The patient is currently smoking. The risks related to smoking were  reviewed with the patient. Recommend maintaining a smoke-free lifestyle. Products available for smoking cessation were discussed in detail. 10. Return to Clinic: in 2 weeks. 11. Reviewed discharge instructions, restrictions, and reasons to call the office. EDUCATION:  Educated patient on plan of care and disease process--all questions answered. Plans discussed with patient and nursing. Reviewed and discussed with Dr. Mamie Cordova. Time spent for discharge: 30 minutes, including plan of care, patient education, and care coordination. Signed:  Nicky Westbrook, KJ - CNP  6/8/2022 12:00 PM    Marge Cordova MD, FACS  6/8/2022  3:40 PM

## 2022-06-08 NOTE — PROGRESS NOTES
Discharge instructions given, all questions answered. IV removed with tip intact. 1 prescription sent to pharmacy, will  on discharge.    Jamie Workman RN

## 2022-06-08 NOTE — OP NOTE
[facility]                     [address] [facility], [address] [facility]                                OPERATIVE REPORT    PATIENT NAME: Skip Aguila                  :        1990  MED REC NO:   [id number]                          ROOM:       [id number]  ACCOUNT NO:   [de-identified]                           ADMIT DATE: 2022  PROVIDER:     Chris Murry MD    DATE OF PROCEDURE:  2022    PREOPERATIVE DIAGNOSIS:  Acute appendicitis. POSTOPERATIVE DIAGNOSIS:  Acute nonperforated appendicitis. OPERATION PERFORMED:  Laparoscopic appendectomy. SURGEON:  Chris Murry MD    ANESTHESIA:  General.    INDICATIONS:  This is a 25-year-old female who presented to [facility] with acute appendicitis. The risks, benefits and alternative  treatments of laparoscopic appendectomy were discussed. Details of the  procedure were discussed. All questions were answered. The patient  understood, agreed, and wished to proceed. OPERATIVE PROCEDURE:  The patient was brought to the operative suite,  laid in supine position. General anesthesia was induced and well  tolerated. The patient's abdomen was prepped and draped in the usual  sterile fashion. After a proper time-out was performed verifying  operative site, procedure, and patient, a supraumbilical incision was  made with a scalpel. The patient has a BMI of 55. As such, larger than  a normal incision was necessary to obtain fascial access. The fascia  was opened with Kocher clamp and then with two interrupted 0 Vicryl  sutures using an open Carmella technique, the fascia was divided with  electrocautery. The peritoneum was entered with blunt dissection. The  Carmella was then placed into the abdominal cavity under direction vision. The peritoneal cavity was then insufflated with carbon dioxide to a  pressure of 15 mmHg, which was well tolerated.   The patient had moderate  amount of omental adhesions to the lower anterior abdominal wall from  the patient's prior cholecystectomy. Additional trocars were placed in  the following locations, bariatric trocars in the left lower quadrant and  suprapubic. The adhesions were taken down with blunt dissection. There  was no bowel involvement in these adhesions. Right lower quadrant was  inspected where acutely inflamed nonperforated appendix was identified. The mesentery was divided with laparoscopic LigaSure device and the base  was divided flushed to the cecum with laparoscopic white load stapler. The base of appendix was relatively uninvolved with the inflammatory  process. The appendix was then freed from its surrounding tissues,  placed into an Endobag, removed through one of the trocars and placed on  the table as specimen. The right lower quadrant again was inspected. Hemostasis was verified and staple line was intact. [employer] suture  passer was used to pass the PDS suture through the umbilical fascial  defect with two interrupted sutures. The abdomen was then allowed to  desufflate and all trocars were then removed under direct vision. Local  anesthetic was injected in the wounds and all skin incisions were closed  with 4-0 suture. The wounds were then cleaned and dressed with  Dermabond. The patient tolerated the procedure well and was transported  to the PACU in stable condition. SPECIMENS:  Appendix. DRAINS:  None. COMPLICATIONS:  None. BLOOD LOSS:  Less than 50 mL.         Ariadna Uribe MD    D: 06/07/2022 16:51:23       T: 06/08/2022 4:13:24     [email or web address]  Job#: [id number]     Doc#: [id number]    CC:

## 2022-06-08 NOTE — PROGRESS NOTES
CLINICAL PHARMACY NOTE: MEDS TO BEDS    Total # of Prescriptions Filled: 1   The following medications were delivered to the patient:  · Oxycodone 5 mg    Additional Documentation:    Patient picked up from Outpatient Pharmacy=Signed  Gildardo Duane CPhT

## 2022-06-08 NOTE — PROGRESS NOTES
Assessment complete. VSS. Abdominal incisions are CD&I. Patient rates pain 7/10, oxy given per patient request. POC discussed regarding ambulation, patient verbalizes understanding. Call light within reach, patient denies further needs.  Will continue to monitor  Cj Salazar RN

## 2022-06-08 NOTE — PLAN OF CARE
Problem: Discharge Planning  Goal: Discharge to home or other facility with appropriate resources  6/7/2022 2239 by Robin Harper RN  Outcome: Progressing  Flowsheets (Taken 6/7/2022 2030)  Discharge to home or other facility with appropriate resources:   Identify barriers to discharge with patient and caregiver   Arrange for needed discharge resources and transportation as appropriate   Identify discharge learning needs (meds, wound care, etc)   Arrange for interpreters to assist at discharge as needed   Refer to discharge planning if patient needs post-hospital services based on physician order or complex needs related to functional status, cognitive ability or social support system  6/7/2022 1634 by Santos Ramirez RN  Outcome: Progressing     Problem: Pain  Goal: Verbalizes/displays adequate comfort level or baseline comfort level  6/7/2022 2239 by Robin Harper RN  Outcome: Progressing  6/7/2022 1634 by Santos Ramirez RN  Outcome: Progressing     Problem: ABCDS Injury Assessment  Goal: Absence of physical injury  6/7/2022 2239 by Robin Harper RN  Outcome: Progressing  Flowsheets (Taken 6/7/2022 2238)  Absence of Physical Injury: Implement safety measures based on patient assessment  6/7/2022 1634 by Santos Ramirez RN  Outcome: Progressing

## 2022-06-08 NOTE — PROGRESS NOTES
PM assessment completed. Pt resting well in bed with no c/o pain or discomfort at this time. No needs voiced. Visitor at bedside. Fall precautions in place, hourly rounding, call light and belongings in reach, bed in lowest position, wheels locked in place, side rails up x 2, walkways free of clutter. Pt independent in room and aquino.

## 2022-06-15 ENCOUNTER — OFFICE VISIT (OUTPATIENT)
Dept: INTERNAL MEDICINE CLINIC | Age: 32
End: 2022-06-15
Payer: COMMERCIAL

## 2022-06-15 VITALS
WEIGHT: 293 LBS | DIASTOLIC BLOOD PRESSURE: 80 MMHG | SYSTOLIC BLOOD PRESSURE: 116 MMHG | OXYGEN SATURATION: 98 % | HEART RATE: 76 BPM | BODY MASS INDEX: 54.76 KG/M2

## 2022-06-15 DIAGNOSIS — Z11.4 ENCOUNTER FOR SCREENING FOR HIV: ICD-10-CM

## 2022-06-15 DIAGNOSIS — T14.8XXA BRUISING: ICD-10-CM

## 2022-06-15 DIAGNOSIS — Z90.49 S/P LAPAROSCOPIC APPENDECTOMY: ICD-10-CM

## 2022-06-15 DIAGNOSIS — F33.2 SEVERE EPISODE OF RECURRENT MAJOR DEPRESSIVE DISORDER, WITHOUT PSYCHOTIC FEATURES (HCC): ICD-10-CM

## 2022-06-15 DIAGNOSIS — Z09 HOSPITAL DISCHARGE FOLLOW-UP: Primary | ICD-10-CM

## 2022-06-15 DIAGNOSIS — R53.83 FATIGUE, UNSPECIFIED TYPE: ICD-10-CM

## 2022-06-15 DIAGNOSIS — Z11.59 NEED FOR HEPATITIS C SCREENING TEST: ICD-10-CM

## 2022-06-15 LAB
HEPATITIS C ANTIBODY INTERPRETATION: NORMAL
T4 FREE: 1.2 NG/DL (ref 0.9–1.8)
TSH REFLEX: 2.47 UIU/ML (ref 0.27–4.2)

## 2022-06-15 PROCEDURE — 99214 OFFICE O/P EST MOD 30 MIN: CPT | Performed by: NURSE PRACTITIONER

## 2022-06-15 ASSESSMENT — PATIENT HEALTH QUESTIONNAIRE - PHQ9
3. TROUBLE FALLING OR STAYING ASLEEP: 2
10. IF YOU CHECKED OFF ANY PROBLEMS, HOW DIFFICULT HAVE THESE PROBLEMS MADE IT FOR YOU TO DO YOUR WORK, TAKE CARE OF THINGS AT HOME, OR GET ALONG WITH OTHER PEOPLE: 1
SUM OF ALL RESPONSES TO PHQ QUESTIONS 1-9: 7
9. THOUGHTS THAT YOU WOULD BE BETTER OFF DEAD, OR OF HURTING YOURSELF: 0
SUM OF ALL RESPONSES TO PHQ QUESTIONS 1-9: 7
6. FEELING BAD ABOUT YOURSELF - OR THAT YOU ARE A FAILURE OR HAVE LET YOURSELF OR YOUR FAMILY DOWN: 1
4. FEELING TIRED OR HAVING LITTLE ENERGY: 2
5. POOR APPETITE OR OVEREATING: 2
1. LITTLE INTEREST OR PLEASURE IN DOING THINGS: 0
SUM OF ALL RESPONSES TO PHQ QUESTIONS 1-9: 7
SUM OF ALL RESPONSES TO PHQ9 QUESTIONS 1 & 2: 0
8. MOVING OR SPEAKING SO SLOWLY THAT OTHER PEOPLE COULD HAVE NOTICED. OR THE OPPOSITE, BEING SO FIGETY OR RESTLESS THAT YOU HAVE BEEN MOVING AROUND A LOT MORE THAN USUAL: 0
7. TROUBLE CONCENTRATING ON THINGS, SUCH AS READING THE NEWSPAPER OR WATCHING TELEVISION: 0
2. FEELING DOWN, DEPRESSED OR HOPELESS: 0
SUM OF ALL RESPONSES TO PHQ QUESTIONS 1-9: 7

## 2022-06-15 ASSESSMENT — ANXIETY QUESTIONNAIRES
6. BECOMING EASILY ANNOYED OR IRRITABLE: 1
5. BEING SO RESTLESS THAT IT IS HARD TO SIT STILL: 0
GAD7 TOTAL SCORE: 3
7. FEELING AFRAID AS IF SOMETHING AWFUL MIGHT HAPPEN: 0
1. FEELING NERVOUS, ANXIOUS, OR ON EDGE: 0
3. WORRYING TOO MUCH ABOUT DIFFERENT THINGS: 0
4. TROUBLE RELAXING: 2
2. NOT BEING ABLE TO STOP OR CONTROL WORRYING: 0
IF YOU CHECKED OFF ANY PROBLEMS ON THIS QUESTIONNAIRE, HOW DIFFICULT HAVE THESE PROBLEMS MADE IT FOR YOU TO DO YOUR WORK, TAKE CARE OF THINGS AT HOME, OR GET ALONG WITH OTHER PEOPLE: SOMEWHAT DIFFICULT

## 2022-06-15 ASSESSMENT — ENCOUNTER SYMPTOMS
ALLERGIC/IMMUNOLOGIC NEGATIVE: 1
ABDOMINAL PAIN: 1
EYES NEGATIVE: 1
RESPIRATORY NEGATIVE: 1

## 2022-06-15 NOTE — PATIENT INSTRUCTIONS
Continue to drink plenty of water  Slowly increase exercise  Avoid fast food  Decrease fast food intake

## 2022-06-15 NOTE — PROGRESS NOTES
Odalys Law (:  1990) is a 33 y.o. female,Established patient, here for evaluation of the following chief complaint(s):  Post-op Problem    SP appendectomy     ASSESSMENT/PLAN:    Po was seen today for post-op problem. Diagnoses and all orders for this visit:    Hospital discharge follow-up    Fatigue, unspecified type  -     TSH with Reflex  -     T4, Free    Bruising  -     DANIELLA Reflex to Antibody Cascade    Encounter for screening for HIV  -     HIV Screen    Severe episode of recurrent major depressive disorder, without psychotic features ([facility] [address]    Need for hepatitis C screening test  -     Hepatitis C Antibody    S/P laparoscopic appendectomy               Subjective   SUBJECTIVE/OBJECTIVE:  HPI  Presents today for follow up on appendectomy, tiredness with random bruising and occasional     Review of Systems   Constitutional: Positive for fatigue. HENT: Negative. Eyes: Negative. Respiratory: Negative. Cardiovascular: Negative. Gastrointestinal: Positive for abdominal pain. Endocrine: Negative. Genitourinary: Negative. Musculoskeletal: Negative. Allergic/Immunologic: Negative. Neurological: Negative. Hematological: Negative. Psychiatric/Behavioral: Negative. Vitals:    06/15/22 1041   BP: 116/80   Pulse: 76   SpO2: 98%     BP Readings from Last 3 Encounters:   06/15/22 116/80   22 133/81   21 118/76     Wt Readings from Last 3 Encounters:   06/15/22 (!) 319 lb (144.7 kg)   22 (!) 321 lb 3.2 oz (145.7 kg)   21 (!) 331 lb (150.1 kg)     Objective   Physical Exam  Constitutional:       Appearance: She is obese. Cardiovascular:      Rate and Rhythm: Normal rate and regular rhythm. Pulmonary:      Effort: Pulmonary effort is normal.      Breath sounds: Normal breath sounds. Abdominal:      General: Abdomen is protuberant. Bowel sounds are normal.      Palpations: Abdomen is soft. Tenderness:  There is abdominal tenderness. Neurological:      Mental Status: She is alert. Psychiatric:         Attention and Perception: Attention normal.         Mood and Affect: Mood is depressed. Comments: Depression scores lower, but still high. States she feels she can handle things for now. Going to get certification as MA                  An electronic signature was used to authenticate this note.     --Bhavin Garnett, KJ - CNP

## 2022-06-16 LAB
ANTI-NUCLEAR ANTIBODY (ANA): NEGATIVE
HIV AG/AB: NORMAL
HIV ANTIGEN: NORMAL
HIV-1 ANTIBODY: NORMAL
HIV-2 AB: NORMAL

## 2022-06-18 ENCOUNTER — APPOINTMENT (OUTPATIENT)
Dept: GENERAL RADIOLOGY | Age: 32
DRG: 711 | End: 2022-06-18
Payer: COMMERCIAL

## 2022-06-18 ENCOUNTER — APPOINTMENT (OUTPATIENT)
Dept: CT IMAGING | Age: 32
DRG: 711 | End: 2022-06-18
Payer: COMMERCIAL

## 2022-06-18 ENCOUNTER — HOSPITAL ENCOUNTER (INPATIENT)
Age: 32
LOS: 5 days | Discharge: HOME | DRG: 711 | End: 2022-06-23
Attending: EMERGENCY MEDICINE | Admitting: INTERNAL MEDICINE
Payer: COMMERCIAL

## 2022-06-18 DIAGNOSIS — N39.0 URINARY TRACT INFECTION WITHOUT HEMATURIA, SITE UNSPECIFIED: ICD-10-CM

## 2022-06-18 DIAGNOSIS — K55.069 OMENTAL INFARCTION (HCC): Primary | ICD-10-CM

## 2022-06-18 DIAGNOSIS — R50.82 POSTOPERATIVE FEVER: ICD-10-CM

## 2022-06-18 LAB
A/G RATIO: 0.6 (ref 1.1–2.2)
A/G RATIO: 0.7 (ref 1.1–2.2)
ALBUMIN SERPL-MCNC: 3.1 G/DL (ref 3.4–5)
ALBUMIN SERPL-MCNC: 3.2 G/DL (ref 3.4–5)
ALP BLD-CCNC: 62 U/L (ref 40–129)
ALP BLD-CCNC: 67 U/L (ref 40–129)
ALT SERPL-CCNC: 12 U/L (ref 10–40)
ALT SERPL-CCNC: 8 U/L (ref 10–40)
ANION GAP SERPL CALCULATED.3IONS-SCNC: 12 MMOL/L (ref 3–16)
ANION GAP SERPL CALCULATED.3IONS-SCNC: 12 MMOL/L (ref 3–16)
AST SERPL-CCNC: 11 U/L (ref 15–37)
AST SERPL-CCNC: 16 U/L (ref 15–37)
BACTERIA: ABNORMAL /HPF
BASOPHILS ABSOLUTE: 0 K/UL (ref 0–0.2)
BASOPHILS ABSOLUTE: 0 K/UL (ref 0–0.2)
BASOPHILS RELATIVE PERCENT: 0.1 %
BASOPHILS RELATIVE PERCENT: 0.2 %
BILIRUB SERPL-MCNC: 0.5 MG/DL (ref 0–1)
BILIRUB SERPL-MCNC: 0.6 MG/DL (ref 0–1)
BILIRUBIN URINE: NEGATIVE
BLOOD, URINE: NEGATIVE
BUN BLDV-MCNC: 5 MG/DL (ref 7–20)
BUN BLDV-MCNC: 6 MG/DL (ref 7–20)
CALCIUM SERPL-MCNC: 8.7 MG/DL (ref 8.3–10.6)
CALCIUM SERPL-MCNC: 9 MG/DL (ref 8.3–10.6)
CHLORIDE BLD-SCNC: 102 MMOL/L (ref 99–110)
CHLORIDE BLD-SCNC: 102 MMOL/L (ref 99–110)
CLARITY: CLEAR
CO2: 20 MMOL/L (ref 21–32)
CO2: 21 MMOL/L (ref 21–32)
COLOR: YELLOW
CREAT SERPL-MCNC: 0.6 MG/DL (ref 0.6–1.1)
CREAT SERPL-MCNC: 0.6 MG/DL (ref 0.6–1.1)
D DIMER: 3.32 UG/ML FEU (ref 0–0.6)
EKG ATRIAL RATE: 103 BPM
EKG DIAGNOSIS: NORMAL
EKG P AXIS: 45 DEGREES
EKG P-R INTERVAL: 124 MS
EKG Q-T INTERVAL: 352 MS
EKG QRS DURATION: 84 MS
EKG QTC CALCULATION (BAZETT): 461 MS
EKG R AXIS: 3 DEGREES
EKG T AXIS: -5 DEGREES
EKG VENTRICULAR RATE: 103 BPM
EOSINOPHILS ABSOLUTE: 0 K/UL (ref 0–0.6)
EOSINOPHILS ABSOLUTE: 0.1 K/UL (ref 0–0.6)
EOSINOPHILS RELATIVE PERCENT: 0.2 %
EOSINOPHILS RELATIVE PERCENT: 0.5 %
EPITHELIAL CELLS, UA: 6 /HPF (ref 0–5)
GFR AFRICAN AMERICAN: >60
GFR AFRICAN AMERICAN: >60
GFR NON-AFRICAN AMERICAN: >60
GFR NON-AFRICAN AMERICAN: >60
GLUCOSE BLD-MCNC: 128 MG/DL (ref 70–99)
GLUCOSE BLD-MCNC: 95 MG/DL (ref 70–99)
GLUCOSE URINE: NEGATIVE MG/DL
HCG(URINE) PREGNANCY TEST: NEGATIVE
HCT VFR BLD CALC: 34.9 % (ref 36–48)
HCT VFR BLD CALC: 36 % (ref 36–48)
HEMOGLOBIN: 11.4 G/DL (ref 12–16)
HEMOGLOBIN: 12 G/DL (ref 12–16)
HYALINE CASTS: 0 /LPF (ref 0–8)
KETONES, URINE: NEGATIVE MG/DL
LEUKOCYTE ESTERASE, URINE: ABNORMAL
LIPASE: 13 U/L (ref 13–60)
LIPASE: 14 U/L (ref 13–60)
LYMPHOCYTES ABSOLUTE: 1 K/UL (ref 1–5.1)
LYMPHOCYTES ABSOLUTE: 1.3 K/UL (ref 1–5.1)
LYMPHOCYTES RELATIVE PERCENT: 10.2 %
LYMPHOCYTES RELATIVE PERCENT: 11.7 %
MCH RBC QN AUTO: 32 PG (ref 26–34)
MCH RBC QN AUTO: 32.3 PG (ref 26–34)
MCHC RBC AUTO-ENTMCNC: 32.7 G/DL (ref 31–36)
MCHC RBC AUTO-ENTMCNC: 33.4 G/DL (ref 31–36)
MCV RBC AUTO: 96.8 FL (ref 80–100)
MCV RBC AUTO: 97.7 FL (ref 80–100)
MICROSCOPIC EXAMINATION: YES
MONOCYTES ABSOLUTE: 0.5 K/UL (ref 0–1.3)
MONOCYTES ABSOLUTE: 0.7 K/UL (ref 0–1.3)
MONOCYTES RELATIVE PERCENT: 4.5 %
MONOCYTES RELATIVE PERCENT: 7.3 %
NEUTROPHILS ABSOLUTE: 8.1 K/UL (ref 1.7–7.7)
NEUTROPHILS ABSOLUTE: 8.9 K/UL (ref 1.7–7.7)
NEUTROPHILS RELATIVE PERCENT: 82.2 %
NEUTROPHILS RELATIVE PERCENT: 83.1 %
NITRITE, URINE: NEGATIVE
PDW BLD-RTO: 13.7 % (ref 12.4–15.4)
PDW BLD-RTO: 13.7 % (ref 12.4–15.4)
PH UA: 7 (ref 5–8)
PLATELET # BLD: 308 K/UL (ref 135–450)
PLATELET # BLD: 363 K/UL (ref 135–450)
PMV BLD AUTO: 7.6 FL (ref 5–10.5)
PMV BLD AUTO: 8 FL (ref 5–10.5)
POTASSIUM REFLEX MAGNESIUM: 4.3 MMOL/L (ref 3.5–5.1)
POTASSIUM SERPL-SCNC: 3.6 MMOL/L (ref 3.5–5.1)
PROTEIN UA: 30 MG/DL
RBC # BLD: 3.57 M/UL (ref 4–5.2)
RBC # BLD: 3.72 M/UL (ref 4–5.2)
RBC UA: 2 /HPF (ref 0–4)
SODIUM BLD-SCNC: 134 MMOL/L (ref 136–145)
SODIUM BLD-SCNC: 135 MMOL/L (ref 136–145)
SPECIFIC GRAVITY UA: 1.02 (ref 1–1.03)
TOTAL PROTEIN: 7.7 G/DL (ref 6.4–8.2)
TOTAL PROTEIN: 8.2 G/DL (ref 6.4–8.2)
TROPONIN: <0.01 NG/ML
URINE REFLEX TO CULTURE: YES
URINE TYPE: ABNORMAL
UROBILINOGEN, URINE: 1 E.U./DL
WBC # BLD: 10.7 K/UL (ref 4–11)
WBC # BLD: 9.8 K/UL (ref 4–11)
WBC UA: 66 /HPF (ref 0–5)

## 2022-06-18 PROCEDURE — 2580000003 HC RX 258: Performed by: PHYSICIAN ASSISTANT

## 2022-06-18 PROCEDURE — 80053 COMPREHEN METABOLIC PANEL: CPT

## 2022-06-18 PROCEDURE — 85379 FIBRIN DEGRADATION QUANT: CPT

## 2022-06-18 PROCEDURE — 1200000000 HC SEMI PRIVATE

## 2022-06-18 PROCEDURE — 2580000003 HC RX 258: Performed by: EMERGENCY MEDICINE

## 2022-06-18 PROCEDURE — 36415 COLL VENOUS BLD VENIPUNCTURE: CPT

## 2022-06-18 PROCEDURE — 93005 ELECTROCARDIOGRAM TRACING: CPT | Performed by: PHYSICIAN ASSISTANT

## 2022-06-18 PROCEDURE — 96361 HYDRATE IV INFUSION ADD-ON: CPT

## 2022-06-18 PROCEDURE — 84703 CHORIONIC GONADOTROPIN ASSAY: CPT

## 2022-06-18 PROCEDURE — 84484 ASSAY OF TROPONIN QUANT: CPT

## 2022-06-18 PROCEDURE — 6360000002 HC RX W HCPCS: Performed by: INTERNAL MEDICINE

## 2022-06-18 PROCEDURE — 6370000000 HC RX 637 (ALT 250 FOR IP)

## 2022-06-18 PROCEDURE — 81001 URINALYSIS AUTO W/SCOPE: CPT

## 2022-06-18 PROCEDURE — 6370000000 HC RX 637 (ALT 250 FOR IP): Performed by: INTERNAL MEDICINE

## 2022-06-18 PROCEDURE — 6360000004 HC RX CONTRAST MEDICATION: Performed by: PHYSICIAN ASSISTANT

## 2022-06-18 PROCEDURE — 96365 THER/PROPH/DIAG IV INF INIT: CPT

## 2022-06-18 PROCEDURE — 83690 ASSAY OF LIPASE: CPT

## 2022-06-18 PROCEDURE — 87086 URINE CULTURE/COLONY COUNT: CPT

## 2022-06-18 PROCEDURE — 94010 BREATHING CAPACITY TEST: CPT

## 2022-06-18 PROCEDURE — 6360000002 HC RX W HCPCS: Performed by: EMERGENCY MEDICINE

## 2022-06-18 PROCEDURE — 94150 VITAL CAPACITY TEST: CPT

## 2022-06-18 PROCEDURE — 2580000003 HC RX 258: Performed by: INTERNAL MEDICINE

## 2022-06-18 PROCEDURE — 99285 EMERGENCY DEPT VISIT HI MDM: CPT

## 2022-06-18 PROCEDURE — 74177 CT ABD & PELVIS W/CONTRAST: CPT

## 2022-06-18 PROCEDURE — 93010 ELECTROCARDIOGRAM REPORT: CPT | Performed by: INTERNAL MEDICINE

## 2022-06-18 PROCEDURE — 71260 CT THORAX DX C+: CPT

## 2022-06-18 PROCEDURE — 71046 X-RAY EXAM CHEST 2 VIEWS: CPT

## 2022-06-18 PROCEDURE — 85025 COMPLETE CBC W/AUTO DIFF WBC: CPT

## 2022-06-18 PROCEDURE — 6360000002 HC RX W HCPCS: Performed by: SPECIALIST

## 2022-06-18 PROCEDURE — 6370000000 HC RX 637 (ALT 250 FOR IP): Performed by: PHYSICIAN ASSISTANT

## 2022-06-18 PROCEDURE — 99222 1ST HOSP IP/OBS MODERATE 55: CPT | Performed by: SPECIALIST

## 2022-06-18 PROCEDURE — 93005 ELECTROCARDIOGRAM TRACING: CPT | Performed by: SPECIALIST

## 2022-06-18 RX ORDER — ONDANSETRON 2 MG/ML
4 INJECTION INTRAMUSCULAR; INTRAVENOUS EVERY 6 HOURS PRN
Status: DISCONTINUED | OUTPATIENT
Start: 2022-06-18 | End: 2022-06-23 | Stop reason: HOSPADM

## 2022-06-18 RX ORDER — PROMETHAZINE HYDROCHLORIDE 25 MG/1
12.5 TABLET ORAL EVERY 6 HOURS PRN
Status: DISCONTINUED | OUTPATIENT
Start: 2022-06-18 | End: 2022-06-23 | Stop reason: HOSPADM

## 2022-06-18 RX ORDER — SODIUM CHLORIDE 9 MG/ML
INJECTION, SOLUTION INTRAVENOUS PRN
Status: DISCONTINUED | OUTPATIENT
Start: 2022-06-18 | End: 2022-06-23 | Stop reason: HOSPADM

## 2022-06-18 RX ORDER — SODIUM CHLORIDE 0.9 % (FLUSH) 0.9 %
10 SYRINGE (ML) INJECTION EVERY 12 HOURS SCHEDULED
Status: DISCONTINUED | OUTPATIENT
Start: 2022-06-18 | End: 2022-06-23 | Stop reason: HOSPADM

## 2022-06-18 RX ORDER — ENOXAPARIN SODIUM 100 MG/ML
30 INJECTION SUBCUTANEOUS 2 TIMES DAILY
Status: DISCONTINUED | OUTPATIENT
Start: 2022-06-18 | End: 2022-06-23 | Stop reason: HOSPADM

## 2022-06-18 RX ORDER — KETOROLAC TROMETHAMINE 30 MG/ML
30 INJECTION, SOLUTION INTRAMUSCULAR; INTRAVENOUS EVERY 6 HOURS PRN
Status: DISPENSED | OUTPATIENT
Start: 2022-06-18 | End: 2022-06-23

## 2022-06-18 RX ORDER — ACETAMINOPHEN 325 MG/1
TABLET ORAL
Status: COMPLETED
Start: 2022-06-18 | End: 2022-06-18

## 2022-06-18 RX ORDER — ACETAMINOPHEN 650 MG/1
650 SUPPOSITORY RECTAL EVERY 6 HOURS PRN
Status: DISCONTINUED | OUTPATIENT
Start: 2022-06-18 | End: 2022-06-23 | Stop reason: HOSPADM

## 2022-06-18 RX ORDER — ACETAMINOPHEN 325 MG/1
650 TABLET ORAL EVERY 6 HOURS PRN
Status: DISCONTINUED | OUTPATIENT
Start: 2022-06-18 | End: 2022-06-23 | Stop reason: HOSPADM

## 2022-06-18 RX ORDER — POTASSIUM CHLORIDE 20 MEQ/1
40 TABLET, EXTENDED RELEASE ORAL PRN
Status: DISCONTINUED | OUTPATIENT
Start: 2022-06-18 | End: 2022-06-23 | Stop reason: HOSPADM

## 2022-06-18 RX ORDER — MORPHINE SULFATE 2 MG/ML
1 INJECTION, SOLUTION INTRAMUSCULAR; INTRAVENOUS EVERY 4 HOURS PRN
Status: DISCONTINUED | OUTPATIENT
Start: 2022-06-18 | End: 2022-06-18

## 2022-06-18 RX ORDER — POTASSIUM CHLORIDE 7.45 MG/ML
10 INJECTION INTRAVENOUS PRN
Status: DISCONTINUED | OUTPATIENT
Start: 2022-06-18 | End: 2022-06-23 | Stop reason: HOSPADM

## 2022-06-18 RX ORDER — MAGNESIUM SULFATE IN WATER 40 MG/ML
2000 INJECTION, SOLUTION INTRAVENOUS PRN
Status: DISCONTINUED | OUTPATIENT
Start: 2022-06-18 | End: 2022-06-23 | Stop reason: HOSPADM

## 2022-06-18 RX ORDER — OXYCODONE HYDROCHLORIDE AND ACETAMINOPHEN 5; 325 MG/1; MG/1
1 TABLET ORAL EVERY 4 HOURS PRN
Status: DISCONTINUED | OUTPATIENT
Start: 2022-06-18 | End: 2022-06-19

## 2022-06-18 RX ORDER — SODIUM CHLORIDE 0.9 % (FLUSH) 0.9 %
10 SYRINGE (ML) INJECTION PRN
Status: DISCONTINUED | OUTPATIENT
Start: 2022-06-18 | End: 2022-06-23 | Stop reason: HOSPADM

## 2022-06-18 RX ORDER — ACETAMINOPHEN 500 MG
1000 TABLET ORAL ONCE
Status: COMPLETED | OUTPATIENT
Start: 2022-06-18 | End: 2022-06-18

## 2022-06-18 RX ORDER — 0.9 % SODIUM CHLORIDE 0.9 %
1000 INTRAVENOUS SOLUTION INTRAVENOUS ONCE
Status: COMPLETED | OUTPATIENT
Start: 2022-06-18 | End: 2022-06-18

## 2022-06-18 RX ADMIN — KETOROLAC TROMETHAMINE 30 MG: 30 INJECTION, SOLUTION INTRAMUSCULAR; INTRAVENOUS at 16:33

## 2022-06-18 RX ADMIN — ACETAMINOPHEN 1000 MG: 500 TABLET ORAL at 10:23

## 2022-06-18 RX ADMIN — Medication 1000 MG: at 21:14

## 2022-06-18 RX ADMIN — SODIUM CHLORIDE, PRESERVATIVE FREE 10 ML: 5 INJECTION INTRAVENOUS at 21:14

## 2022-06-18 RX ADMIN — ENOXAPARIN SODIUM 30 MG: 100 INJECTION SUBCUTANEOUS at 21:14

## 2022-06-18 RX ADMIN — SODIUM CHLORIDE 3000 MG: 900 INJECTION INTRAVENOUS at 12:55

## 2022-06-18 RX ADMIN — ACETAMINOPHEN 650 MG: 325 TABLET ORAL at 21:24

## 2022-06-18 RX ADMIN — IOPAMIDOL 140 ML: 755 INJECTION, SOLUTION INTRAVENOUS at 12:03

## 2022-06-18 RX ADMIN — SODIUM CHLORIDE 1000 ML: 9 INJECTION, SOLUTION INTRAVENOUS at 10:47

## 2022-06-18 RX ADMIN — ACETAMINOPHEN 650 MG: 325 TABLET ORAL at 15:23

## 2022-06-18 ASSESSMENT — PAIN DESCRIPTION - ORIENTATION: ORIENTATION: LOWER;UPPER

## 2022-06-18 ASSESSMENT — PAIN DESCRIPTION - DESCRIPTORS: DESCRIPTORS: ACHING;TENDER

## 2022-06-18 ASSESSMENT — PAIN DESCRIPTION - PAIN TYPE: TYPE: ACUTE PAIN

## 2022-06-18 ASSESSMENT — PAIN - FUNCTIONAL ASSESSMENT: PAIN_FUNCTIONAL_ASSESSMENT: 0-10

## 2022-06-18 ASSESSMENT — PAIN SCALES - GENERAL
PAINLEVEL_OUTOF10: 8
PAINLEVEL_OUTOF10: 0
PAINLEVEL_OUTOF10: 8

## 2022-06-18 ASSESSMENT — PAIN DESCRIPTION - LOCATION: LOCATION: ABDOMEN

## 2022-06-18 NOTE — CONSULTS
General surgery consultation    Chief complaint upper abdominal pain and urinary incontinence x3 today    History of present illness patient is a 43-year-old female morbidly obese who approximately 10 days ago underwent a laparoscopic appendectomy her postoperative course was uneventful and she was subsequently discharged 24 hours after the surgical procedure. Patient states that she was doing well for approximately 3 days after discharge when she developed pain in the upper abdomen this pain has been unrelenting it is not associated with any nausea or vomiting fever or chills no frequency urgency or dysuria patient was doing her best to deal with this pain when today she had 3 episodes of urinary incontinence and it was for this reason that she came to the emergency room. While in the emergency room initial CT scan of the chest was unremarkable for pulmonary infiltrates or pulmonary embolus.   Follow-up CT scan of the abdomen pelvis was interpreted by the radiologist to potentially so omental infarction and or possible herniation within an umbilical hernia there was no evidence of small bowel incarceration or obstruction    Past medical history postpartum depression previous suicide attempt urinary tract infection and acute appendicitis morbid obesity    Past surgical history laparoscopic cholecystectomy tubal ligation andtympanostoy most recently a laparoscopic appendectomy    Family history of thyroid disease cerebrovascular accident seizures ovarian cast her migraines hypercholesterolemia congestive heart failure breast cancer asthma osteoarthritis hypertension and glaucoma    Social history patient smokes occasionally drinks    Allergies none    Home medications none    Review of systems patient denies any vertigo diplopia circumoral numbness dysarthria dysphagia no hot or cold intolerance no history of jaundice mandie colored stools or dark-colored urine appetite is generally good weight is stable no hematemesis hematochezia or melena no blood dyscrasias    Physical examination patient's vital signs are stable she is mildly tachycardic and has a low-grade fever of 100.3  Patient is lying on an ER gurney relatively peacefully without no apparent distress  HEENT normocephalic atraumatic extract muscles intact pupils equal round reactive to light and accommodate oropharynx is clear neck supple without adenopathy  Chest symmetrical excursion breath sounds bilaterally they are somewhat diminished in the left lower lung field  Cardiovascular regular rate and rhythm without murmur or gallop no JVD or pedal edema  Abdomen she has a well-healed incision in the supraumbilical region she has no surrounding erythema or fluctuance the abdomen itself is soft with mild diffuse tenderness primarily in the upper abdomen as opposed to the lower bowel sounds are active there is no guarding or rebound no CVA tenderness though her obesity precludes a good examination  Musculoskeletal strength 5/5  Neurologically without focal findings    At present patient has only has a urinalysis which is essentially negative  CT scan of the chest shows no evidence of pulmonary embolus or pulmonary infiltrates  CT scan of the abdomen pelvis shows stranding of the omentum suggestive of omental infarction changes consistent with a appendectomy and purportedly a multilobulated umbilical hernia with fat stranding as well there is no incarcerated bowel or evidence of a small bowel obstruction.     Assessment and plan patient presents to the emergency room primarily for urinary incontinence x3 today associated with upper abdominal pain 10 days after laparoscopic appendectomy patient has no evidence of peritoneal findings CT of the abdomen pelvis has findings that are of uncertain significance it is possible that her pain in the upper abdomen could be due to omental infarction but patient seems relatively comfortable lying on the bed for this to be a true problem at this time as far as her urinary incontinence it is unclear to me why that should be an issue associated with her prior surgery a urinalysis of been obtained which shows no evidence of infection at this time given the confusing clinical presentation I think patient should be admitted to the hospitalist service with surgical consultation we will follow her clinically determine whether there is any significance to the CT findings we will start the patient on a clear liquid diet advance as tolerated perform serial abdominal examinations and x-rays

## 2022-06-18 NOTE — ED PROVIDER NOTES
I independently performed a history and physical on [address]. I personally saw the patient and performed a substantive portion of the visit including all aspects of the medical decision making. Briefly, this is a 33 y.o. female here for abdominal pain, fevers, urinary incontinence. Has been ongoing since 3 days after her surgery. No nausea or vomiting. She was not aware that she had fever. No back pain. No numbness or tingling. Mild constipation. Her abdominal discomfort is epigastric/umbilical in location. Denies chest discomfort but endorses feeling more winded easily. On exam,   General: Patient is in no acute distress  Skin: No cyanosis  HEENT: Moist mucous membranes  Heart: Regular rate, regular rhythm  Lung: No respiratory distress. ctabl  Abdomen: Soft, umbilical tenderness without rebound or guarding. 3 lap appendectomy sites well approximated appearing normal  Neuro: Moving all extremities, no facial droop, no slurred speech, answers questions appropriately        EKG  The Ekg interpreted by me in the absence of a cardiologist shows. Normal sinus rhythm  No acute ST changes     No significant EKG changes compared to study in 6/15      Screenings   Milana Coma Scale  Eye Opening: Spontaneous  Best Verbal Response: Oriented  Best Motor Response: Obeys commands  Milana Coma Scale Score: 15        MDM  Briefly, this is a 33 y.o. female here for abdominal pain. Postop day 11 from lap appendectomy. She was found to be febrile to 100.3. Also short of breath. Obtain CT chest which showed no pulmonary embolism or pneumonia. Obtain CT abdomen and pelvis which revealed omental infarction. Consulted general surgery who requested medicine admission. Place patient on Unasyn. They state that they will see the patient today. .    Is this patient to be included in the SEP-1 Core Measure due to severe sepsis or septic shock?    No   Exclusion criteria - the patient is NOT to be included for SEP-1 Core Measure due to:  2+ SIRS criteria are not met           Patient Referrals:  No follow-up provider specified. Discharge Medications:  New Prescriptions    No medications on file       FINAL IMPRESSION  1. Omental infarction (HCC)        Blood pressure 121/67, pulse 81, temperature 100.3 °F (37.9 °C), temperature source Oral, resp. rate 18, height 5' 4\" (1.626 m), weight (!) 321 lb (145.6 kg), last menstrual period 06/09/2022, SpO2 100 %, not currently breastfeeding.      For further details of [facility] emergency department encounter, please see documentation by advanced practice provider, Matt Evans MD  06/18/22 3758

## 2022-06-18 NOTE — ED PROVIDER NOTES
[facility]        Pt Name: Eugene Weaver  MRN: [id number]  [place] 1990  Date of evaluation: 6/18/2022  Provider: LINCOLN Erickson  PCP: KJ Rangel - CNP  Note Started: 10:30 AM EDT       SAW. I have evaluated this patient. My supervising physician was available for consultation. CHIEF COMPLAINT       Chief Complaint   Patient presents with    Post-op Problem     pt had a lap appy on 6/7/22 with dr Susan Troy.  having increased pain in abd, fevers, urinary incontinence       HISTORY OF PRESENT ILLNESS   (Location, Timing/Onset, Context/Setting, Quality, Duration, Modifying Factors, Severity, Associated Signs and Symptoms)  Note limiting factors. Chief Complaint: Fever, shortness of breath, cough and abdominal pain    Eugene Weaver is a 33 y.o. female who presents fever, shortness of breath, cough and abdominal pain starting approximately 3 days after her surgery on June 7, 2022 for appendicitis. Patient states shortly after her surgery on the seventh she developed a low-grade fever and was feeling kind of sick. Patient states that she has also noticed some cough shortness of breath a few days after that. Patient states her fever has been consistent around 100 °F.  Patient denies any history of DVTs or PEs. Patient denies hormonal use. Patient denies smoking history. Patient states that her abdominal pain is mainly located over her surgical sites and states that they are warm to touch at times. She also notes some abdominal \"knots\" on the bilateral upper abdominal area. Patient states that she has been taking ibuprofen for her fevers with little to no relief. Patient has also noticed some chest tightness since this is began. Patient states that she is also noted some urinary incontinence starting approximately 3 days ago.   Patient states that whenever she has urge to go to the bathroom she sometimes wets herself prior to getting to the bathroom in time. Nursing Notes were all reviewed and agreed with or any disagreements were addressed in the HPI. REVIEW OF SYSTEMS    (2-9 systems for level 4, 10 or more for level 5)     Review of Systems    Positives and Pertinent negatives as per HPI. Except as noted above in the ROS, all other systems were reviewed and negative. PAST MEDICAL HISTORY     Past Medical History:   Diagnosis Date    Postpartum depression 2015 & 2016    Psychiatric problem     Suicide attempt [facility]) 2015    overdose on depression medication    UTI (urinary tract infection) 06/09/2016         SURGICAL HISTORY     Past Surgical History:   Procedure Laterality Date    CHOLECYSTECTOMY      LAPAROSCOPIC APPENDECTOMY N/A 6/7/2022    LAPAROSCOPIC APPENDECTOMY performed by Daniel Lakhani MD at [facility]  2013    TYMPANOSTOMY TUBE PLACEMENT Bilateral     as a child         CURRENTMEDICATIONS       Previous Medications    No medications on file         ALLERGIES     Patient has no known allergies.     FAMILYHISTORY       Family History   Problem Relation Age of Onset    No Known Problems Mother     Mental Illness Father         PTSD- [employer]    ADHD Brother     No Known Problems Maternal Aunt     No Known Problems Maternal Uncle     No Known Problems Paternal Aunt     No Known Problems Paternal Uncle     Diabetes Maternal Grandmother     Hypertension Maternal Grandmother     Glaucoma Maternal Grandfather     No Known Problems Paternal Grandmother     No Known Problems Paternal Grandfather     No Known Problems Other     Rheum Arthritis Neg Hx     Osteoarthritis Neg Hx     Asthma Neg Hx     Breast Cancer Neg Hx     Cancer Neg Hx     Heart Failure Neg Hx     High Cholesterol Neg Hx     Migraines Neg Hx     Ovarian Cancer Neg Hx     Rashes/Skin Problems Neg Hx     Seizures Neg Hx     Stroke Neg Hx     Thyroid Disease Neg Hx           SOCIAL HISTORY Social History     Tobacco Use    Smoking status: Current Every Day Smoker     Packs/day: 0.10     Years: 5.00     Pack years: 0.50     Types: Cigarettes     Start date: 2014    Smokeless tobacco: Never Used    Tobacco comment: cessation 7/16   Vaping Use    Vaping Use: Never used   Substance Use Topics    Alcohol use: Yes     Alcohol/week: 2.0 standard drinks     Types: 2 Glasses of wine per week     Comment: Social    Drug use: Yes     Types: Marijuana (Weed)     Comment: smokes 15 joints/ week       SCREENINGS    Milana Coma Scale  Eye Opening: Spontaneous  Best Verbal Response: Oriented  Best Motor Response: Obeys commands  Milana Coma Scale Score: 15        PHYSICAL EXAM    (up to 7 for level 4, 8 or more for level 5)     ED Triage Vitals [06/18/22 1004]   BP Temp Temp Source Heart Rate Resp SpO2 Height Weight   (!) 171/79 100.3 °F (37.9 °C) Oral (!) 116 18 99 % 5' 4\" (1.626 m) (!) 321 lb (145.6 kg)       Physical Exam  Vitals and nursing note reviewed. Constitutional:       General: She is not in acute distress. Appearance: Normal appearance. She is obese. She is not ill-appearing. HENT:      Mouth/Throat:      Mouth: Mucous membranes are moist.      Pharynx: Oropharynx is clear. No oropharyngeal exudate or posterior oropharyngeal erythema. Cardiovascular:      Rate and Rhythm: Regular rhythm. Tachycardia present. Heart sounds: Normal heart sounds. No murmur heard. No friction rub. No gallop. Comments: Bilateral radial pulses equal and intact 2+. Bilateral posterior tibialis and dorsalis pedis pulses equal and intact 2+. Pulmonary:      Effort: Pulmonary effort is normal. No respiratory distress. Breath sounds: Normal breath sounds. No stridor. No wheezing, rhonchi or rales. Abdominal:      General: Bowel sounds are normal. There is no distension. Palpations: There is no mass. Tenderness: There is abdominal tenderness.  There is no right CVA tenderness, left the absence of a cardiologist.  Please see their note for interpretation of EKG. RADIOLOGY:   Non-plain film images such as CT, Ultrasound and MRI are read by the radiologist. Plain radiographic images are visualized and preliminarily interpreted by the ED Provider with the below findings:        Interpretation per the Radiologist below, if available at the time of this note:    CT ABDOMEN PELVIS W IV CONTRAST Additional Contrast? None   Preliminary Result   1. No evidence of pulmonary embolus or acute pulmonary abnormality. 2.  Status post appendectomy. There is a large area of stranding and   haziness in the ventral midline omental fat, suggesting postoperative omental   infarct. Multilobulated umbilical hernia with fat stranding noted as well. No evidence of fluid collection to indicate abscess at this time. CT CHEST PULMONARY EMBOLISM W CONTRAST   Preliminary Result   1. No evidence of pulmonary embolus or acute pulmonary abnormality. 2.  Status post appendectomy. There is a large area of stranding and   haziness in the ventral midline omental fat, suggesting postoperative omental   infarct. Multilobulated umbilical hernia with fat stranding noted as well. No evidence of fluid collection to indicate abscess at this time. XR CHEST (2 VW)   Preliminary Result   Minimal stranding at the right base, likely atelectasis. No other acute   findings. No results found.         PROCEDURES   Unless otherwise noted below, none     Procedures    CRITICAL CARE TIME       CONSULTS:  IP CONSULT TO GENERAL SURGERY      EMERGENCY DEPARTMENT COURSE and DIFFERENTIAL DIAGNOSIS/MDM:   Vitals:    Vitals:    06/18/22 1004 06/18/22 1053 06/18/22 1230 06/18/22 1231   BP: (!) 171/79 (!) 145/77 121/67    Pulse: (!) 116   81   Resp: 18      Temp: 100.3 °F (37.9 °C)      TempSrc: Oral      SpO2: 99% 98% 100% 100%   Weight: (!) 321 lb (145.6 kg)      Height: 5' 4\" (1.626 m)          Patient was given the following medications:  Medications   ampicillin-sulbactam (UNASYN) 3000 mg in 100 mL NS IVPB minibag (3,000 mg IntraVENous New Bag 6/18/22 1255)   0.9 % sodium chloride bolus (0 mLs IntraVENous Stopped 6/18/22 1154)   acetaminophen (TYLENOL) tablet 1,000 mg (1,000 mg Oral Given 6/18/22 1023)   iopamidol (ISOVUE-370) 76 % injection 75 mL (140 mLs IntraVENous Given 6/18/22 1203)         Is this patient to be included in the SEP-1 Core Measure due to severe sepsis or septic shock? No   Exclusion criteria - the patient is NOT to be included for SEP-1 Core Measure due to:  2+ SIRS criteria are not met    20-year-old female presents to the emergency department due to subjective fevers, abdominal pain and urinary incontinence since having surgery on the seventh of this month. Patient states that she developed subjective fevers approximate 3 days after the surgery. In the emergency department she was noted to be tachycardic this reason medication and IV fluids were started as well as lab test with D-dimer was ordered to assess for pulmonary embolism. D-dimer came back elevated CT scan pulmonary arteries was ordered and did not reveal any pulmonary embolism. CT scan of the abdomen was also pursued due to this pain and recent surgery which did show a omentum infarct. General surgery was contacted Dr. Bere Tejeda talked to Dr. Kathryn Her who stated that he will see the patient here in the hospital and have her admitted. Patient's urinalysis did reveal a urinary tract infection and patient was started on Unasyn here in the emergency department while we are waiting for CT scan results. Patient's tachycardia improved with IV fluids and pain medication. Patient will be admitted to the hospital evaluation due to this mental infarct, postop fever and urinary tract infection. FINAL IMPRESSION      1. Omental infarction ([address]    2. Urinary tract infection without hematuria, site unspecified    3.  Postoperative fever DISPOSITION/PLAN   DISPOSITION Decision To Admit 06/18/2022 01:06:42 PM      PATIENT REFERRED TO:  No follow-up provider specified.     DISCHARGE MEDICATIONS:  New Prescriptions    No medications on file       DISCONTINUED MEDICATIONS:  Discontinued Medications    No medications on file              (Please note that portions of this note were completed with a voice recognition program.  Efforts were made to edit the dictations but occasionally words are mis-transcribed.)    LINCOLN May (electronically signed)            LINCOLN May  06/18/22 [phone]

## 2022-06-18 NOTE — H&P
Hospital Medicine History & Physical      PCP: Lucio Casanova APRN - CNP    Date of Admission: 6/18/2022    Chief Complaint: Abdominal pain, shortness of breath    History Of Present Illness:  Patient is a 71-year-old female without significant past medical history who presented to hospital for abdominal pain according to patient she recently had surgery for her appendix removal done 6/7, she followed up with general surgeon after that, she was doing well however she started having increased abdominal pain fevers. Patient mentions she felt nauseated otherwise denied diarrhea. Patient also mentions she has shortness of breath which was actually sent recommend her come to the hospital.  On further evaluation patient mentions her pain can range from 5-9/10 intensity. Past Medical History:          Diagnosis Date    Postpartum depression 2015 & 2016    Psychiatric problem     Suicide attempt [facility]) 2015    overdose on depression medication    UTI (urinary tract infection) 06/09/2016       Past Surgical History:          Procedure Laterality Date    CHOLECYSTECTOMY      LAPAROSCOPIC APPENDECTOMY N/A 6/7/2022    LAPAROSCOPIC APPENDECTOMY performed by Kati Lepe MD at [address] [place] [address] S  2013    TYMPANOSTOMY TUBE PLACEMENT Bilateral     as a child       Medications Prior to Admission:      Prior to Admission medications    Not on File       Allergies:  Patient has no known allergies. Social History:      TOBACCO:   reports that she has been smoking cigarettes. She started smoking about 8 years ago. She has a 0.50 pack-year smoking history. She has never used smokeless tobacco.  ETOH:   reports current alcohol use of about 2.0 standard drinks of alcohol per week.       Family History:       Reviewed in detail and non contributory          Problem Relation Age of Onset    No Known Problems Mother     Mental Illness Father         PTSD- [employer]    ADHD Brother     No Known Problems Maternal Aunt     No Known Problems Maternal Uncle     No Known Problems Paternal Aunt     No Known Problems Paternal Uncle     Diabetes Maternal Grandmother     Hypertension Maternal Grandmother     Glaucoma Maternal Grandfather     No Known Problems Paternal Grandmother     No Known Problems Paternal Grandfather     No Known Problems Other     Rheum Arthritis Neg Hx     Osteoarthritis Neg Hx     Asthma Neg Hx     Breast Cancer Neg Hx     Cancer Neg Hx     Heart Failure Neg Hx     High Cholesterol Neg Hx     Migraines Neg Hx     Ovarian Cancer Neg Hx     Rashes/Skin Problems Neg Hx     Seizures Neg Hx     Stroke Neg Hx     Thyroid Disease Neg Hx        REVIEW OF SYSTEMS:   Pertinent positives as noted in the HPI. All other systems reviewed and negative. PHYSICAL EXAM PERFORMED:    /82   Pulse 91   Temp 100 °F (37.8 °C) (Oral)   Resp 18   Ht 5' 4\" (1.626 m)   Wt (!) 321 lb (145.6 kg)   LMP 06/09/2022   SpO2 96%   BMI 55.10 kg/m²     General appearance:  No apparent distress, cooperative. HEENT:  Normal cephalic, atraumatic without obvious deformity. Conjunctivae/corneas clear. Neck: Supple, with full range of motion. No cervical lymphadenopathy  Respiratory:  Normal respiratory effort. Clear to auscultation, bilaterally without Rales/Wheezes/Rhonchi. Cardiovascular:  Regular rate and rhythm with normal S1/S2 without murmurs, rubs or gallops. Abdomen: Soft, non-tender, non-distended, normal bowel sounds. Musculoskeletal:  No edema noted bilaterally. No tenderness on palpation   Skin: no rash visible  Neurologic:  Neurologically intact without any focal sensory/motor deficits.   grossly non-focal.  Psychiatric:  Alert and oriented, normal mood  Peripheral Pulses: +2 palpable, equal bilaterally       Labs:     Recent Labs     06/18/22  1022 06/18/22  1742   WBC 10.7 9.8   HGB 12.0 11.4*   HCT 36.0 34.9*    308     Recent Labs     06/18/22  1022 06/18/22  1742   * 135*   K 4.3 3.6    102   CO2 20* 21   BUN 6* 5*   CREATININE 0.6 0.6   CALCIUM 9.0 8.7     Recent Labs     06/18/22  1022 06/18/22  1742   AST 16 11*   ALT 8* 12   BILITOT 0.5 0.6   ALKPHOS 67 62     No results for input(s): INR in the last 72 hours. Recent Labs     06/18/22  1022   TROPONINI <0.01       Urinalysis:      Lab Results   Component Value Date    NITRU Negative 06/18/2022    WBCUA 66 06/18/2022    BACTERIA Rare 06/18/2022    RBCUA 2 06/18/2022    BLOODU Negative 06/18/2022    SPECGRAV 1.020 06/18/2022    GLUCOSEU Negative 06/18/2022    GLUCOSEU NEGATIVE 02/14/2012       Radiology:       CT ABDOMEN PELVIS W IV CONTRAST Additional Contrast? None   Final Result   1. No evidence of pulmonary embolus or acute pulmonary abnormality. 2.  Status post appendectomy. There is a large area of stranding and   haziness in the ventral midline omental fat, suggesting postoperative omental   infarct. Multilobulated umbilical hernia with fat stranding noted as well. No evidence of fluid collection to indicate abscess at this time. CT CHEST PULMONARY EMBOLISM W CONTRAST   Final Result   1. No evidence of pulmonary embolus or acute pulmonary abnormality. 2.  Status post appendectomy. There is a large area of stranding and   haziness in the ventral midline omental fat, suggesting postoperative omental   infarct. Multilobulated umbilical hernia with fat stranding noted as well. No evidence of fluid collection to indicate abscess at this time. XR CHEST (2 VW)   Final Result   Minimal stranding at the right base, likely atelectasis. No other acute   findings.                  Active Hospital Problems    Diagnosis Date Noted    Omental infarction [facility]) Eligio Aguilar 06/18/2022     Priority: Medium    Urinary tract infection without hematuria [N39.0] 06/18/2022     Priority: Medium    Postoperative fever [R50.82] 06/18/2022     Priority: Medium    UTI (urinary tract infection) [N39.0] 06/18/2022     Priority: Medium         Patient is a 58-year-old female without significant past medical history who presented to hospital for abdominal pain according to patient she recently had surgery for her appendix removal done 6/7, she followed up with general surgeon after that, she was doing well however she started having increased abdominal pain fevers. Patient mentions she felt nauseated otherwise denied diarrhea. Patient also mentions she has shortness of breath which was actually sent recommend her come to the hospital.  On further evaluation patient mentions her pain can range from 5-9/10 intensity. Assessment  Abdominal pain, omental infarction on CT abdomen  Postoperative fever  Shortness of breath secondary to postoperative atelectasis  UTI  Morbid obesity    Plan  General surgery consulted from ED, follow-up on recommendations  Start IV Rocephin  Follow-up on urine culture  Bedside spirometry  Pain control  DVT prophylaxis- Lovenox  Diet: ADULT DIET; Regular  Code Status: Full Code    PT/OT Eval Status: ordered    Dispo - pending clinical improvement       Michell Anderson MD    The note was completed using EMR and Dragon dictation system. Every effort was made to ensure accuracy; however, inadvertent computerized transcription errors may be present. Thank you Nury Rodriguez, APRN - CNP for the opportunity to be involved in this patient's care. If you have any questions or concerns please feel free to contact me at [phone].     Michell Anderson MD

## 2022-06-18 NOTE — PROGRESS NOTES
Incentive Spirometry education and demonstration completed by Respiratory Therapy Yes      Response to education: Very Good     Teaching Time: 10 minutes    Minimum Predicted Vital Capacity - 547 mL. Patient's Actual Vital Capacity - 600 mL. Turning over to Nursing for routine follow-up Yes.       Electronically signed by Adilene Das on 6/18/2022 at 6:04 PM

## 2022-06-19 LAB
ANION GAP SERPL CALCULATED.3IONS-SCNC: 11 MMOL/L (ref 3–16)
BACTERIA: ABNORMAL /HPF
BASOPHILS ABSOLUTE: 0 K/UL (ref 0–0.2)
BASOPHILS RELATIVE PERCENT: 0.2 %
BILIRUBIN URINE: ABNORMAL
BLOOD, URINE: ABNORMAL
BUN BLDV-MCNC: 9 MG/DL (ref 7–20)
CALCIUM SERPL-MCNC: 8.7 MG/DL (ref 8.3–10.6)
CHLORIDE BLD-SCNC: 102 MMOL/L (ref 99–110)
CLARITY: ABNORMAL
CO2: 21 MMOL/L (ref 21–32)
COLOR: ABNORMAL
CREAT SERPL-MCNC: 0.8 MG/DL (ref 0.6–1.1)
EKG ATRIAL RATE: 88 BPM
EKG DIAGNOSIS: NORMAL
EKG P AXIS: 33 DEGREES
EKG P-R INTERVAL: 138 MS
EKG Q-T INTERVAL: 356 MS
EKG QRS DURATION: 78 MS
EKG QTC CALCULATION (BAZETT): 430 MS
EKG R AXIS: 5 DEGREES
EKG T AXIS: -11 DEGREES
EKG VENTRICULAR RATE: 88 BPM
EOSINOPHILS ABSOLUTE: 0 K/UL (ref 0–0.6)
EOSINOPHILS RELATIVE PERCENT: 0.5 %
EPITHELIAL CELLS, UA: 7 /HPF (ref 0–5)
GFR AFRICAN AMERICAN: >60
GFR NON-AFRICAN AMERICAN: >60
GLUCOSE BLD-MCNC: 93 MG/DL (ref 70–99)
GLUCOSE URINE: NEGATIVE MG/DL
HCT VFR BLD CALC: 37.6 % (ref 36–48)
HEMOGLOBIN: 12.2 G/DL (ref 12–16)
HYALINE CASTS: 2 /LPF (ref 0–8)
INFLUENZA A: NOT DETECTED
INFLUENZA B: NOT DETECTED
KETONES, URINE: 15 MG/DL
LACTIC ACID: 0.7 MMOL/L (ref 0.4–2)
LEUKOCYTE ESTERASE, URINE: ABNORMAL
LYMPHOCYTES ABSOLUTE: 1.3 K/UL (ref 1–5.1)
LYMPHOCYTES RELATIVE PERCENT: 13.4 %
MCH RBC QN AUTO: 31.7 PG (ref 26–34)
MCHC RBC AUTO-ENTMCNC: 32.3 G/DL (ref 31–36)
MCV RBC AUTO: 98.3 FL (ref 80–100)
MICROSCOPIC EXAMINATION: YES
MONOCYTES ABSOLUTE: 1 K/UL (ref 0–1.3)
MONOCYTES RELATIVE PERCENT: 10.4 %
NEUTROPHILS ABSOLUTE: 7.2 K/UL (ref 1.7–7.7)
NEUTROPHILS RELATIVE PERCENT: 75.5 %
NITRITE, URINE: NEGATIVE
PDW BLD-RTO: 14.1 % (ref 12.4–15.4)
PH UA: 5.5 (ref 5–8)
PLATELET # BLD: 300 K/UL (ref 135–450)
PMV BLD AUTO: 7.9 FL (ref 5–10.5)
POTASSIUM REFLEX MAGNESIUM: 4.3 MMOL/L (ref 3.5–5.1)
PROTEIN UA: 100 MG/DL
RBC # BLD: 3.83 M/UL (ref 4–5.2)
RBC UA: 3 /HPF (ref 0–4)
SARS-COV-2 RNA, RT PCR: NOT DETECTED
SODIUM BLD-SCNC: 134 MMOL/L (ref 136–145)
SPECIFIC GRAVITY UA: >=1.03 (ref 1–1.03)
TROPONIN: <0.01 NG/ML
URINE CULTURE, ROUTINE: NORMAL
URINE TYPE: ABNORMAL
UROBILINOGEN, URINE: 1 E.U./DL
WBC # BLD: 9.5 K/UL (ref 4–11)
WBC UA: 111 /HPF (ref 0–5)

## 2022-06-19 PROCEDURE — 99024 POSTOP FOLLOW-UP VISIT: CPT | Performed by: SPECIALIST

## 2022-06-19 PROCEDURE — 93010 ELECTROCARDIOGRAM REPORT: CPT | Performed by: INTERNAL MEDICINE

## 2022-06-19 PROCEDURE — 87636 SARSCOV2 & INF A&B AMP PRB: CPT

## 2022-06-19 PROCEDURE — 6360000002 HC RX W HCPCS: Performed by: INTERNAL MEDICINE

## 2022-06-19 PROCEDURE — 87040 BLOOD CULTURE FOR BACTERIA: CPT

## 2022-06-19 PROCEDURE — 6360000002 HC RX W HCPCS: Performed by: SPECIALIST

## 2022-06-19 PROCEDURE — 87086 URINE CULTURE/COLONY COUNT: CPT

## 2022-06-19 PROCEDURE — 83605 ASSAY OF LACTIC ACID: CPT

## 2022-06-19 PROCEDURE — 6370000000 HC RX 637 (ALT 250 FOR IP): Performed by: SPECIALIST

## 2022-06-19 PROCEDURE — 2580000003 HC RX 258: Performed by: INTERNAL MEDICINE

## 2022-06-19 PROCEDURE — 85025 COMPLETE CBC W/AUTO DIFF WBC: CPT

## 2022-06-19 PROCEDURE — 84484 ASSAY OF TROPONIN QUANT: CPT

## 2022-06-19 PROCEDURE — 81001 URINALYSIS AUTO W/SCOPE: CPT

## 2022-06-19 PROCEDURE — 1200000000 HC SEMI PRIVATE

## 2022-06-19 PROCEDURE — 36415 COLL VENOUS BLD VENIPUNCTURE: CPT

## 2022-06-19 PROCEDURE — 80048 BASIC METABOLIC PNL TOTAL CA: CPT

## 2022-06-19 PROCEDURE — 6370000000 HC RX 637 (ALT 250 FOR IP): Performed by: INTERNAL MEDICINE

## 2022-06-19 PROCEDURE — 93005 ELECTROCARDIOGRAM TRACING: CPT | Performed by: INTERNAL MEDICINE

## 2022-06-19 RX ORDER — HYDROCODONE BITARTRATE AND ACETAMINOPHEN 7.5; 325 MG/1; MG/1
1 TABLET ORAL EVERY 6 HOURS PRN
Status: DISCONTINUED | OUTPATIENT
Start: 2022-06-19 | End: 2022-06-23 | Stop reason: HOSPADM

## 2022-06-19 RX ORDER — SODIUM CHLORIDE 9 MG/ML
INJECTION, SOLUTION INTRAVENOUS CONTINUOUS
Status: DISCONTINUED | OUTPATIENT
Start: 2022-06-19 | End: 2022-06-23 | Stop reason: HOSPADM

## 2022-06-19 RX ADMIN — KETOROLAC TROMETHAMINE 30 MG: 30 INJECTION, SOLUTION INTRAMUSCULAR; INTRAVENOUS at 00:16

## 2022-06-19 RX ADMIN — HYDROCODONE BITARTRATE AND ACETAMINOPHEN 1 TABLET: 7.5; 325 TABLET ORAL at 18:33

## 2022-06-19 RX ADMIN — OXYCODONE AND ACETAMINOPHEN 1 TABLET: 5; 325 TABLET ORAL at 00:15

## 2022-06-19 RX ADMIN — KETOROLAC TROMETHAMINE 30 MG: 30 INJECTION, SOLUTION INTRAMUSCULAR; INTRAVENOUS at 06:17

## 2022-06-19 RX ADMIN — KETOROLAC TROMETHAMINE 30 MG: 30 INJECTION, SOLUTION INTRAMUSCULAR; INTRAVENOUS at 11:53

## 2022-06-19 RX ADMIN — VANCOMYCIN HYDROCHLORIDE 1500 MG: 10 INJECTION, POWDER, LYOPHILIZED, FOR SOLUTION INTRAVENOUS at 17:28

## 2022-06-19 RX ADMIN — OXYCODONE AND ACETAMINOPHEN 1 TABLET: 5; 325 TABLET ORAL at 10:01

## 2022-06-19 RX ADMIN — CEFEPIME HYDROCHLORIDE 2000 MG: 2 INJECTION, POWDER, FOR SOLUTION INTRAVENOUS at 20:41

## 2022-06-19 RX ADMIN — ENOXAPARIN SODIUM 30 MG: 100 INJECTION SUBCUTANEOUS at 10:01

## 2022-06-19 RX ADMIN — ACETAMINOPHEN 650 MG: 325 TABLET ORAL at 10:01

## 2022-06-19 RX ADMIN — MAGNESIUM HYDROXIDE 30 ML: 400 SUSPENSION ORAL at 20:46

## 2022-06-19 RX ADMIN — SODIUM CHLORIDE, PRESERVATIVE FREE 10 ML: 5 INJECTION INTRAVENOUS at 10:03

## 2022-06-19 RX ADMIN — SODIUM CHLORIDE: 9 INJECTION, SOLUTION INTRAVENOUS at 11:53

## 2022-06-19 RX ADMIN — KETOROLAC TROMETHAMINE 30 MG: 30 INJECTION, SOLUTION INTRAMUSCULAR; INTRAVENOUS at 20:17

## 2022-06-19 RX ADMIN — ENOXAPARIN SODIUM 30 MG: 100 INJECTION SUBCUTANEOUS at 20:32

## 2022-06-19 ASSESSMENT — PAIN DESCRIPTION - ONSET
ONSET: ON-GOING
ONSET: ON-GOING

## 2022-06-19 ASSESSMENT — PAIN DESCRIPTION - LOCATION
LOCATION: ABDOMEN
LOCATION: ABDOMEN

## 2022-06-19 ASSESSMENT — PAIN DESCRIPTION - PAIN TYPE: TYPE: ACUTE PAIN

## 2022-06-19 ASSESSMENT — PAIN SCALES - GENERAL
PAINLEVEL_OUTOF10: 7
PAINLEVEL_OUTOF10: 0
PAINLEVEL_OUTOF10: 7
PAINLEVEL_OUTOF10: 7
PAINLEVEL_OUTOF10: 0
PAINLEVEL_OUTOF10: 8

## 2022-06-19 ASSESSMENT — PAIN DESCRIPTION - ORIENTATION
ORIENTATION: UPPER
ORIENTATION: LOWER;UPPER

## 2022-06-19 ASSESSMENT — PAIN DESCRIPTION - FREQUENCY
FREQUENCY: CONTINUOUS
FREQUENCY: CONTINUOUS

## 2022-06-19 ASSESSMENT — PAIN DESCRIPTION - DESCRIPTORS
DESCRIPTORS: SHARP
DESCRIPTORS: SORE;ACHING

## 2022-06-19 ASSESSMENT — PAIN - FUNCTIONAL ASSESSMENT: PAIN_FUNCTIONAL_ASSESSMENT: ACTIVITIES ARE NOT PREVENTED

## 2022-06-19 NOTE — PROGRESS NOTES
Bath: patient bathed this AM  Linen Change: linens changed  Ice Water: new ice water given     [address]  Call light within reach and belongings at bedside within reach, no needs at this time.

## 2022-06-19 NOTE — PROGRESS NOTES
Hospitalist Progress Note      PCP: KJ Shipman - TERESA    Chief Complaint. Patient is a 79-year-old female without significant past medical history who presented to hospital for abdominal pain according to patient she recently had surgery for her appendix removal done 6/7, she followed up with general surgeon after that, she was doing well however she started having increased abdominal pain fevers. Patient mentions she felt nauseated otherwise denied diarrhea. Patient also mentions she has shortness of breath which was actually sent recommend her come to the hospital.  On further evaluation patient mentions her pain can range from 5-9/10 intensity.       Date of Admission: 6/18/2022      Subjective: Patient mentions she still has abdominal discomfort, he had fevers overnight. Denies chest pain, nausea, vomiting, shortness of breath    Medications:  Reviewed    Infusion Medications    sodium chloride 75 mL/hr at 06/19/22 1153    sodium chloride       Scheduled Medications    sodium chloride flush  10 mL IntraVENous 2 times per day    enoxaparin  30 mg SubCUTAneous BID    cefTRIAXone (ROCEPHIN) IV  1,000 mg IntraVENous Q24H     PRN Meds: sodium chloride flush, sodium chloride, potassium chloride **OR** potassium alternative oral replacement **OR** potassium chloride, potassium chloride, magnesium sulfate, promethazine **OR** ondansetron, magnesium hydroxide, acetaminophen **OR** acetaminophen, ketorolac, oxyCODONE-acetaminophen    No intake or output data in the 24 hours ending 06/19/22 1611    Physical Exam Performed:    /74   Pulse 90   Temp 100.3 °F (37.9 °C) (Oral)   Resp 22   Ht 5' 4\" (1.626 m)   Wt (!) 321 lb (145.6 kg)   LMP 06/09/2022   SpO2 95%   BMI 55.10 kg/m²     General appearance: No apparent distress,   HEENT:  Conjunctivae/corneas clear. Neck: Supple, with full range of motion. Respiratory:  Normal respiratory effort.  Clear to auscultation, bilaterally ventral midline omental fat, suggesting postoperative omental   infarct. Multilobulated umbilical hernia with fat stranding noted as well. No evidence of fluid collection to indicate abscess at this time. XR CHEST (2 VW)   Final Result   Minimal stranding at the right base, likely atelectasis. No other acute   findings. Assessment/Plan:    Active Hospital Problems    Diagnosis     Omental infarction [facility]) [K55.069]      Priority: Medium    Urinary tract infection without hematuria [N39.0]      Priority: Medium    Postoperative fever [R50.82]      Priority: Medium    UTI (urinary tract infection) [N39.0]      Priority: Medium     Patient is a 21-year-old female without significant past medical history who presented to hospital for abdominal pain according to patient she recently had surgery for her appendix removal done 6/7, she followed up with general surgeon after that, she was doing well however she started having increased abdominal pain fevers. Patient mentions she felt nauseated otherwise denied diarrhea. Patient also mentions she has shortness of breath which was actually sent recommend her come to the hospital.  On further evaluation patient mentions her pain can range from 5-9/10 intensity.     Assessment  Abdominal pain, omental infarction on CT abdomen  Postoperative fever  Shortness of breath secondary to postoperative atelectasis  UTI  Morbid obesity     Plan  General surgery consulted from ED-no plans for surgical intervention, diet advanced to regular diet, patient is a spiking fevers on IV Rocephin, switched antibiotics to IV vancomycin, cefepime. Start IV fluids, infectious work-up ordered including infectious disease consult  Follow-up on urine culture, previous urine cultures reviewed-E. coli urine has been pansensitive in 2019  Bedside spirometry  Pain control  DVT prophylaxis- Lovenox  Diet: ADULT DIET;  Regular  Code Status: Full Code    PT/OT Eval Status: ordered    Dispo/Plan of care -continue IV antibiotics, follow-up with general surgery, ID likely discharge 1 to 2 days pending clinical improvement    Pati Liu MD

## 2022-06-19 NOTE — PROGRESS NOTES
Hospital day #2    Patient is resting comfortably in bed with no apparent distress per nursing patient is tolerating her diet she is moving reasonably well about the floor but she continues to have a low-grade fever patient states that while the pain is diminished still there. Vital signs are stable T-max of 100.3    Physical examination healthy-appearing female in much less distress than yesterday  Chest diminished breath sounds bilaterally  Cardiovascular regular rate and rhythm  Abdomen is soft less tender without guarding or rebound surgical incision appears clean bowel sounds are present  Extremities without calf tenderness neurologic fully no focal findings    Laboratories sodium 134 potassium 4.3 BUN 9 creatinine of 0.8 white count of 9.5 H&H of 12 and 37 platelet count of 761,928    Assessment and plan patient is approximately 9 days out from a laparoscopic appendectomy she continues to have low-grade fevers which I suspect is urinary in origin given that her urinalysis showed moderate leukocyte Estrace however she did have a number of epithelial cells which suggest that this was a poor specimen the other source could be due to pulmonary atelectasis and the an incentive spirometer has been placed at the bedside and the patient has been instructed in its use. There is some suggestion by CT the patient has omental infarction this is unknown of itself could cause abdominal pain and fevers but does not warrant surgical intervention and should resolve over the next 3 to 5 days if it is a source of her pain and fevers.   Patient does not have an intra-abdominal abscess associated with her prior laparoscopic appendectomy at present she is on empiric antibiotics we will continue to monitor her and see how she progresses

## 2022-06-19 NOTE — PROGRESS NOTES
PM assessment completed. Pt resting in bed, still with elevated temp 100.6 F. PRN tylenol administered at this time. Pt tolerating diet and activity well. No further needs voiced/ noted. Remains independent in room and aquino. Fall precautions in place, hourly rounding, call light and belongings in reach, bed in lowest position, wheels locked in place, side rails up x 2, walkways free of clutter.

## 2022-06-19 NOTE — PROGRESS NOTES
Clinical Pharmacy Note: Pharmacy to Dose Vancomycin    Shade Phelps is a 33 y.o. female started on Vancomycin for sepsis of unknown etiology; consult received from Dr. Bethany Iniguez to manage therapy. Also receiving the following antibiotics: cefepime. Goal AUC: 400-600 mg/L*hr  Goal Trough Level: 15 mcg/mL    Assessment/Plan:    Initiate vancomycin 1500 mg IV every 12 hours. Bayesian modeling predicts an AUC of 550 mg/L*hr and a trough of 14.1 mcg/mL at steady state concentration. A vancomycin random level has been ordered for 06/20/22 at 0600  Changes in regimen will be determined based on culture results, renal function, and clinical response. Pharmacy will continue to monitor and adjust regimen as necessary. Allergies:  Patient has no known allergies. Recent Labs     06/18/22  1742 06/19/22  0504   CREATININE 0.6 0.8       Recent Labs     06/18/22  1742 06/19/22  0504   WBC 9.8 9.5       Ht Readings from Last 1 Encounters:   06/18/22 5' 4\" (1.626 m)        Wt Readings from Last 1 Encounters:   06/18/22 (!) 321 lb (145.6 kg)         Estimated Creatinine Clearance: 147 mL/min (based on SCr of 0.8 mg/dL). Thank you for allowing us to participate in the care of this patient.     Floydene Curling, PharmD, BCPS   x [phone]

## 2022-06-20 LAB
ANION GAP SERPL CALCULATED.3IONS-SCNC: 11 MMOL/L (ref 3–16)
BUN BLDV-MCNC: 9 MG/DL (ref 7–20)
CALCIUM SERPL-MCNC: 8.7 MG/DL (ref 8.3–10.6)
CHLORIDE BLD-SCNC: 99 MMOL/L (ref 99–110)
CO2: 21 MMOL/L (ref 21–32)
CREAT SERPL-MCNC: 0.6 MG/DL (ref 0.6–1.1)
EKG ATRIAL RATE: 97 BPM
EKG DIAGNOSIS: NORMAL
EKG P AXIS: 59 DEGREES
EKG P-R INTERVAL: 144 MS
EKG Q-T INTERVAL: 350 MS
EKG QRS DURATION: 74 MS
EKG QTC CALCULATION (BAZETT): 444 MS
EKG R AXIS: 19 DEGREES
EKG T AXIS: 4 DEGREES
EKG VENTRICULAR RATE: 97 BPM
GFR AFRICAN AMERICAN: >60
GFR NON-AFRICAN AMERICAN: >60
GLUCOSE BLD-MCNC: 102 MG/DL (ref 70–99)
POTASSIUM REFLEX MAGNESIUM: 3.8 MMOL/L (ref 3.5–5.1)
PROCALCITONIN: 0.08 NG/ML (ref 0–0.15)
SODIUM BLD-SCNC: 131 MMOL/L (ref 136–145)
URINE CULTURE, ROUTINE: NORMAL
VANCOMYCIN RANDOM: 6.6 UG/ML

## 2022-06-20 PROCEDURE — 6360000002 HC RX W HCPCS: Performed by: SPECIALIST

## 2022-06-20 PROCEDURE — 2580000003 HC RX 258: Performed by: INTERNAL MEDICINE

## 2022-06-20 PROCEDURE — 36415 COLL VENOUS BLD VENIPUNCTURE: CPT

## 2022-06-20 PROCEDURE — 99255 IP/OBS CONSLTJ NEW/EST HI 80: CPT | Performed by: INTERNAL MEDICINE

## 2022-06-20 PROCEDURE — 80048 BASIC METABOLIC PNL TOTAL CA: CPT

## 2022-06-20 PROCEDURE — 99232 SBSQ HOSP IP/OBS MODERATE 35: CPT | Performed by: SPECIALIST

## 2022-06-20 PROCEDURE — 94760 N-INVAS EAR/PLS OXIMETRY 1: CPT

## 2022-06-20 PROCEDURE — 6360000002 HC RX W HCPCS: Performed by: INTERNAL MEDICINE

## 2022-06-20 PROCEDURE — 80202 ASSAY OF VANCOMYCIN: CPT

## 2022-06-20 PROCEDURE — 87641 MR-STAPH DNA AMP PROBE: CPT

## 2022-06-20 PROCEDURE — 6370000000 HC RX 637 (ALT 250 FOR IP): Performed by: INTERNAL MEDICINE

## 2022-06-20 PROCEDURE — 93010 ELECTROCARDIOGRAM REPORT: CPT | Performed by: INTERNAL MEDICINE

## 2022-06-20 PROCEDURE — 0202U NFCT DS 22 TRGT SARS-COV-2: CPT

## 2022-06-20 PROCEDURE — 84145 PROCALCITONIN (PCT): CPT

## 2022-06-20 PROCEDURE — 1200000000 HC SEMI PRIVATE

## 2022-06-20 RX ADMIN — VANCOMYCIN HYDROCHLORIDE 1500 MG: 10 INJECTION, POWDER, LYOPHILIZED, FOR SOLUTION INTRAVENOUS at 14:21

## 2022-06-20 RX ADMIN — HYDROCODONE BITARTRATE AND ACETAMINOPHEN 1 TABLET: 7.5; 325 TABLET ORAL at 08:23

## 2022-06-20 RX ADMIN — CEFEPIME HYDROCHLORIDE 2000 MG: 2 INJECTION, POWDER, FOR SOLUTION INTRAVENOUS at 08:33

## 2022-06-20 RX ADMIN — VANCOMYCIN HYDROCHLORIDE 1500 MG: 10 INJECTION, POWDER, LYOPHILIZED, FOR SOLUTION INTRAVENOUS at 05:47

## 2022-06-20 RX ADMIN — SODIUM CHLORIDE: 9 INJECTION, SOLUTION INTRAVENOUS at 14:21

## 2022-06-20 RX ADMIN — HYDROCODONE BITARTRATE AND ACETAMINOPHEN 1 TABLET: 7.5; 325 TABLET ORAL at 14:20

## 2022-06-20 RX ADMIN — ACETAMINOPHEN 650 MG: 325 TABLET ORAL at 08:23

## 2022-06-20 RX ADMIN — HYDROCODONE BITARTRATE AND ACETAMINOPHEN 1 TABLET: 7.5; 325 TABLET ORAL at 20:32

## 2022-06-20 RX ADMIN — KETOROLAC TROMETHAMINE 30 MG: 30 INJECTION, SOLUTION INTRAMUSCULAR; INTRAVENOUS at 04:28

## 2022-06-20 RX ADMIN — ENOXAPARIN SODIUM 30 MG: 100 INJECTION SUBCUTANEOUS at 20:31

## 2022-06-20 RX ADMIN — KETOROLAC TROMETHAMINE 30 MG: 30 INJECTION, SOLUTION INTRAMUSCULAR; INTRAVENOUS at 20:32

## 2022-06-20 RX ADMIN — MEROPENEM 1000 MG: 1 INJECTION, POWDER, FOR SOLUTION INTRAVENOUS at 20:33

## 2022-06-20 RX ADMIN — KETOROLAC TROMETHAMINE 30 MG: 30 INJECTION, SOLUTION INTRAMUSCULAR; INTRAVENOUS at 13:03

## 2022-06-20 RX ADMIN — SODIUM CHLORIDE, PRESERVATIVE FREE 10 ML: 5 INJECTION INTRAVENOUS at 20:31

## 2022-06-20 ASSESSMENT — PAIN DESCRIPTION - ONSET: ONSET: ON-GOING

## 2022-06-20 ASSESSMENT — PAIN DESCRIPTION - DESCRIPTORS
DESCRIPTORS: ACHING
DESCRIPTORS: SORE;ACHING

## 2022-06-20 ASSESSMENT — PAIN SCALES - GENERAL
PAINLEVEL_OUTOF10: 0
PAINLEVEL_OUTOF10: 6
PAINLEVEL_OUTOF10: 7
PAINLEVEL_OUTOF10: 6

## 2022-06-20 ASSESSMENT — PAIN DESCRIPTION - LOCATION
LOCATION: ABDOMEN
LOCATION: ABDOMEN

## 2022-06-20 ASSESSMENT — PAIN DESCRIPTION - ORIENTATION
ORIENTATION: MID
ORIENTATION: MID

## 2022-06-20 ASSESSMENT — PAIN DESCRIPTION - FREQUENCY: FREQUENCY: CONTINUOUS

## 2022-06-20 ASSESSMENT — PAIN DESCRIPTION - PAIN TYPE: TYPE: ACUTE PAIN

## 2022-06-20 NOTE — PROGRESS NOTES
Hospitalist Progress Note      PCP: Lucia Lane, APRN - CNP    Date of Admission: 6/18/2022    Chief Complaint: Abdominal pain      Subjective:   Continues to have persistent abd pain. Denies dysuria, chills,. Otherwise no new acute complaints. Medications:  Reviewed    Infusion Medications    sodium chloride 75 mL/hr at 06/20/22 1421    sodium chloride       Scheduled Medications    vancomycin  1,500 mg IntraVENous Q8H    cefepime  2,000 mg IntraVENous Q12H    sodium chloride flush  10 mL IntraVENous 2 times per day    enoxaparin  30 mg SubCUTAneous BID     PRN Meds: HYDROcodone-acetaminophen, sodium chloride flush, sodium chloride, potassium chloride **OR** potassium alternative oral replacement **OR** potassium chloride, potassium chloride, magnesium sulfate, promethazine **OR** ondansetron, magnesium hydroxide, acetaminophen **OR** acetaminophen, ketorolac    No intake or output data in the 24 hours ending 06/20/22 1648    Exam:    /76   Pulse 80   Temp 98.6 °F (37 °C) (Oral)   Resp 16   Ht 5' 4\" (1.626 m)   Wt (!) 321 lb (145.6 kg)   LMP 06/09/2022   SpO2 98%   BMI 55.10 kg/m²     Gen/overall appearance: Not in acute distress. Alert. Head: Normocephalic, atraumatic  Eyes: EOMI, no scleral icterus  CVS: regular rate and rhythm, Normal S1S2  Pulm: Clear to auscultation bilaterally. No crackles/wheezes  Gastrointestinal: Soft, mild epigastric TTP, obese, incisions healing well, no guarding or rebound  Extremities: No edema.  No erythema or warmth  Neuro: No gross focal deficits noted  Skin: Warm, dry    Labs:   Recent Labs     06/18/22  1022 06/18/22  1742 06/19/22  0504   WBC 10.7 9.8 9.5   HGB 12.0 11.4* 12.2   HCT 36.0 34.9* 37.6    308 300     Recent Labs     06/18/22  1742 06/19/22  0504 06/20/22  0505   * 134* 131*   K 3.6 4.3 3.8    102 99   CO2 21 21 21   BUN 5* 9 9   CREATININE 0.6 0.8 0.6   CALCIUM 8.7 8.7 8.7     Recent Labs     06/18/22  1028 06/18/22  1742   AST 16 11*   ALT 8* 12   BILITOT 0.5 0.6   ALKPHOS 67 62     No results for input(s): INR in the last 72 hours. Recent Labs     06/18/22  1022 06/19/22  1740   TROPONINI <0.01 <0.01       Assessment/Plan:    Active Hospital Problems    Diagnosis Date Noted    Sepsis ([facility] [address] [A41.9]      Priority: Medium    Umbilical hernia without obstruction and without gangrene [K42.9]      Priority: Medium    Hyponatremia [E87.1]      Priority: Medium    History of cannabis abuse [F12.11]      Priority: Medium    Omental infarction [facility]) Claryce Anastasiya 06/18/2022     Priority: Medium    Urinary tract infection without hematuria [N39.0] 06/18/2022     Priority: Medium    Postoperative fever [R50.82] 06/18/2022     Priority: Medium    UTI (urinary tract infection) [N39.0] 06/18/2022     Priority: Medium    Morbid obesity due to excess calories ([facility] [address] [E66.01] 04/25/2013     Abdominal pain. Suspect 2/2 omental infarction as per CT abdomen  Recent appendicitis s/p lap estephania 6/7  - GS following; no surgical pains at this time  - pain management    Postoperative fever  Shortness of breath secondary to postoperative atelectasis  Questionable UTI  - on empiric cefepime and vanco  - follow up cultures  - IVF hydration  - trend CBC    Morbid obesity      DVT Prophylaxis: lovenox   Diet: ADULT DIET;  Regular  Code Status: Full Code      Justin Richards MD

## 2022-06-20 NOTE — PROGRESS NOTES
Hospital day #3  Patient states that she continues to have pain primarily around the umbilicus but is diminishing in its extent apparently the patient spiked a fever to 101 last night is presently being treated for a urinary tract infection    Vital signs are stable T-max of 101.3    Physical examination  Chest clear to auscultation percussion  Cardiovascular regular rate and rhythm  Abdomen soft less tender some mild tenderness around around the umbilical incision no evidence of cellulitic change or crepitus  Extremity without calf tenderness  Neurologically without focal findings    Electrolytes within normal limits  Urine culture is still pending    Assessment and plan patient continues to have significant resolution of her abdominal pain she is tolerating a diet unfortunately she has developed significant fever. She is presently on treatment for a urinary tract infection though the specimen appeared to have questionable validity given the amount of squamous epithelial cells in the specimen. Meanwhile it is my suspicion that the patient's fevers are based upon atelectasis and continue to encourage her to ambulate and perform adequate pulmonary toilet with the incentive spirometer.   As far as the CT findings suggestive of omental infarction and fat tissue nth in the umbilicus incision these should be self-limiting issues which should be addressed on a long-term basis we will continue to treat the patient and monitor her progress

## 2022-06-20 NOTE — PROGRESS NOTES
Occupational/Physical Therapy    Patient I in room, no therapy needs identified at this time. Will discharge from caseload at this time.  Thank you,  Mark Coto OTR/L [address] [place] [address]

## 2022-06-20 NOTE — PROGRESS NOTES
Bath: patient given bath supplies this AM  Linen Change: linens changed  Ice Water: new ice water given     Fall Risk: LOW

## 2022-06-20 NOTE — CONSULTS
Infectious Diseases   Consult Note        Admission Date: 6/18/2022  Hospital Day: Hospital Day: 3   Attending: Geraldine Mccray MD  Date of service: 6/20/22     Reason for admission: Omental infarction [facility]) [K55.069]  UTI (urinary tract infection) [N39.0]  Postoperative fever [R50.82]  Urinary tract infection without hematuria, site unspecified [N39.0]    Chief complaint/ Reason for consult: Sepsis    Microbiology:        I have reviewed allavailable micro lab data and cultures    · Blood culture (2/2) - collected on 6/18/2022: In process  · Urine culture  - collected on 6/18/2022: In process      Antibiotics and immunizations:       Current antibiotics: All antibiotics and their doses were reviewed by me    Recent Abx Admin                   cefepime (MAXIPIME) 2000 mg IVPB minibag (mg) 2,000 mg New Bag 06/20/22 0833     2,000 mg New Bag 06/19/22 2041    vancomycin (VANCOCIN) 1,500 mg in dextrose 5 % 250 mL IVPB (mg) 1,500 mg New Bag 06/20/22 0547     1,500 mg New Bag 06/19/22 1728                  Immunization History: All immunization history was reviewed by me today.     Immunization History   Administered Date(s) Administered    DTP 1991, 02/10/1992, 11/17/1992, 09/09/1993, 10/17/1995    HPV Quadrivalent (Gardasil) 08/18/2009, 07/19/2010    Hepatitis B Ped/Adol (Engerix-B, Recombivax HB) 04/09/2003, 08/05/2003, 12/23/2003    Hib vaccine 1991, 02/10/1992, 11/17/1992    Influenza Vaccine, unspecified formulation 10/30/2009, 12/10/2013    Influenza Whole 02/10/2011    Influenza, High Dose (Fluzone 65 yrs and older) 12/10/2013    Influenza, Dada Cortez, IM, PF (6 mo and older Fluzone, Flulaval, Fluarix, and 3 yrs and older Afluria) 10/04/2017, 10/27/2020    MMR 12/16/1992, 04/09/2003    Meningococcal MCV4P (Menactra) 05/26/2008    Pneumococcal Polysaccharide ([id number]) 05/17/2018    Polio OPV 1991, 02/10/1992, 11/17/1992, 09/09/1993, 10/17/1995    Tdap (Boostrix, Adacel) 05/26/2008, 09/05/2012, 04/05/2018    Tetanus Toxoid, absorbed 08/05/2003    Varicella (Varivax) 04/09/2003, 01/16/2008       Known drug allergies: All allergies were reviewed and updated    No Known Allergies    Social history:     Social History:  All social andepidemiologic history was reviewed and updated by me today as needed. · Tobacco use:   reports that she has been smoking cigarettes. She started smoking about 8 years ago. She has a 0.50 pack-year smoking history. She has never used smokeless tobacco.  · Alcohol use:   reports current alcohol use of about 2.0 standard drinks of alcohol per week. · Currently lives in: [facility]  ·  reports current drug use. Drug: Marijuana Dauna Other). COVID VACCINATION AND LAB RESULT RECORDS:     Internal Administration   First Dose      Second Dose           Last COVID Lab SARS-CoV-2 (no units)   Date Value   05/05/2020 Not Detected     SARS-CoV-2 RNA, RT PCR (no units)   Date Value   06/19/2022 NOT DETECTED            Assessment:     The patient is a 33 y.o. old female who  has a past medical history of Postpartum depression (2015 & 2016), Psychiatric problem, Suicide attempt ([facility] [address] (2015), and UTI (urinary tract infection) (06/09/2016). with following problems:    · Sepsis on admission with high fever, tachycardia, tachypnea  · History of laparoscopic appendectomy with large area of stranding and haziness in the ventral omental fat concerning for possibility of omental infarct on CT scan  · Abdominal wall cellulitis involving periumbilical area  · Multiloculated umbilical hernia with fat stranding-signs of infection on clinical exam  · Mild hyponatremia  · Moderate leukocyte esterase on urinalysis on 6/19/2022  · History of cannabis and benzodiazepine abuse with positive urine tox screen in 2015  · History of postpartum depression  · Obesity Class 3 due to excess calorie intake : Body mass index is 55.1 kg/m².   ·       Discussion:      The patient has been having fever for past 3 days and the fever went up to 103 yesterday. White cell count was only 9500 on admission. No CBC available after that.    2 sets of blood cultures from 6/19/2022 are running negative    The patient is saturating 98% on room air    The patient had a CT angiogram of the chest done as well day before yesterday and that was negative for pulmonary embolism    The patient has swelling and tenderness and erythema in the periumbilical area that is likely the source of his infection    Plan:     Diagnostic Workup:    · Follow-up on blood cultures from yesterday  · Will order procalcitonin level  · We will order urine tox screen  · Continue to follow fever curve, WBC count and blood cultures  · Follow up on liverand renal functions closely  · Check daily CBC and CMP for now   · We will also do a respiratory viral PCR panel for thoroughness of work-up    Antimicrobials:    · Agree with broadening antibiotics  · Continue empiric IV vancomycin  Check Vancomycin trough before the 5th dose. Target vancomycin trough level of 15-20  mg/L or vancomycin area under curve (AUC) of 400-600 mg*h/L by Bayesian modeling method. If dosing vancomycin based on trough levels, keep vancomycin trough below 20 at all times. Avoid increasing the dose of vancomycin above a total of 4 grams in a 24-hour period in patients younger than 45 years and above 3 grams in a 24-hour period in a patient of age 39 years or older. Continue to monitor serum creatinine and Vanco levels closely, while the patient is on I/v Vancomycin. · Will stop empiric IV cefepime  · Since the patient continues to spike fevers despite IV cefepime, I will be escalating gram-negative coverage further from IV cefepime to IV meropenem  · We will follow up on the culture results and clinical progress and will make further recommendations accordingly. · Continue close vitals monitoring. · Maintain good glycemic control.   · Fall precautions. Aspiration precautions. · Continue to watch for new fever or diarrhea. · DVT prophylaxis. · Discussed all above with patient and RN. Drug Monitoring:    · Continue serial monitoring for antibiotic toxicity as follows: Vancomycin trough, CBC, CMP  · Continue to watch for following: new or worsening fever, hypotension, hives, lip swelling and redness or purulence at vascular access sites. I/v access Management:    · Continue to monitor i.v access sites for erythema, induration, discharge or tenderness. · As always, continue efforts to minimizetubes/lines/drains as clinically appropriate to reduce chances of line associated infections. Current isolation precautions: There are no current isolations documented for this patient. Level of complexity of consult: High     Risk of Complications/Morbidity: High     · Illness(es)/ Infection present that pose threat to life/bodily function. · There is potential for severe exacerbation of infection/side effects of treatment. · Therapy requires intensive monitoring for antimicrobial agent toxicity. Thank you for involving me in the care of your patient. I will continue to follow. If you have any additional questions, please do not hesitate to contact me. Subjective:     Presenting complaint in ER:     Chief Complaint   Patient presents with    Post-op Problem     pt had a lap appy on 6/7/22 with dr Erendira Brewster.  having increased pain in abd, fevers, urinary incontinence        HPI: Clau Pimentel is a 33 y.o. female patient, who was seen at the request of Dr. Loire Phillip MD.    History was obtained from chart review and the patient. The patient was admitted on 6/18/2022. I have been consulted to see the patient for above mentioned reason(s). The patient has multiple medical comorbidities, and presented to the ER for abdominal pain, nausea and increasing shortness of breath.     The patient recently had appendectomy done on 6/7/2022. She had a temperature of 101.4 on presentation with white cell count of 9500 and the fever went up to 103 Last night    2 sets of blood cultures were done on admission and they are running negative so far. Patient had a COVID-19 PCR test done on 6/19/2022 which was negative. The patient received IV Unasyn on admission and is currently on IV vancomycin and cefepime    I have been asked for my opinion for management for this patient. Past Medical History: All past medical history reviewed today. Past Medical History:   Diagnosis Date    Postpartum depression 2015 & 2016    Psychiatric problem     [facility]icide [facility]) 2015    overdose on depression medication    UTI (urinary tract infection) 06/09/2016         Past Surgical History: All pastsurgical history was reviewed today. Past Surgical History:   Procedure Laterality Date    CHOLECYSTECTOMY      LAPAROSCOPIC APPENDECTOMY N/A 6/7/2022    LAPAROSCOPIC APPENDECTOMY performed by Kathryn Mata MD at [address] [place] [address]  2013    TYMPANOSTOMY TUBE PLACEMENT Bilateral     as a child         Family History: All family history was reviewed today.         Problem Relation Age of Onset    No Known Problems Mother     Mental Illness Father         PTSD- [employer]    ADHD Brother     No Known Problems Maternal Aunt     No Known Problems Maternal Uncle     No Known Problems Paternal Aunt     No Known Problems Paternal Uncle     Diabetes Maternal Grandmother     Hypertension Maternal Grandmother     Glaucoma Maternal Grandfather     No Known Problems Paternal Grandmother     No Known Problems Paternal Grandfather     No Known Problems Other     Rheum Arthritis Neg Hx     Osteoarthritis Neg Hx     Asthma Neg Hx     Breast Cancer Neg Hx     Cancer Neg Hx     Heart Failure Neg Hx     High Cholesterol Neg Hx     Migraines Neg Hx     Ovarian Cancer Neg Hx     Rashes/Skin Problems Neg Hx     Seizures Neg Hx Physical Exam  Vitals and nursing note reviewed. Constitutional:       General: She is not in acute distress. Appearance: She is well-developed. She is not diaphoretic. HENT:      Head: Normocephalic. Right Ear: External ear normal.      Left Ear: External ear normal.      Nose: Nose normal.   Eyes:      General: No scleral icterus. Right eye: No discharge. Left eye: No discharge. Conjunctiva/sclera: Conjunctivae normal.      Pupils: Pupils are equal, round, and reactive to light. Cardiovascular:      Rate and Rhythm: Normal rate and regular rhythm. Heart sounds: No murmur heard. No friction rub. Pulmonary:      Effort: Pulmonary effort is normal.      Breath sounds: No stridor. No wheezing or rales. Chest:      Chest wall: No tenderness. Abdominal:      Palpations: Abdomen is soft. There is no mass. Tenderness: There is abdominal tenderness. There is no guarding or rebound. Comments: Abdominal wall tenderness and cellulitis noted to the periumbilical area   Musculoskeletal:         General: No tenderness. Cervical back: Normal range of motion and neck supple. Lymphadenopathy:      Cervical: No cervical adenopathy. Skin:     General: Skin is warm and dry. Findings: No erythema or rash. Neurological:      Mental Status: She is alert and oriented to person, place, and time. Motor: No abnormal muscle tone. Psychiatric:         Judgment: Judgment normal.           Lines and drains: All vascular access sites are healthy with no local erythema, discharge or tenderness. Intake and output:     No intake/output data recorded. Lab Data:   All available labs were reviewed by me today.      CBC:   Recent Labs     06/18/22  1022 06/18/22  1742 06/19/22  0504   WBC 10.7 9.8 9.5   RBC 3.72* 3.57* 3.83*   HGB 12.0 11.4* 12.2   HCT 36.0 34.9* 37.6    308 300   MCV 96.8 97.7 98.3   MCH 32.3 32.0 31.7   MCHC 33.4 32.7 32.3   RDW 13.7 13.7 14.1        BMP:  Recent Labs     06/18/22  1742 06/19/22  0504 06/20/22  0505   * 134* 131*   K 3.6 4.3 3.8    102 99   CO2 21 21 21   BUN 5* 9 9   CREATININE 0.6 0.8 0.6   CALCIUM 8.7 8.7 8.7   GLUCOSE 95 93 102*        Hepatic FunctionPanel:   Lab Results   Component Value Date    ALKPHOS 62 06/18/2022    ALT 12 06/18/2022    AST 11 06/18/2022    PROT 7.7 06/18/2022    BILITOT 0.6 06/18/2022    LABALBU 3.1 06/18/2022       CPK: No results found for: CKTOTAL  ESR: No results found for: SEDRATE  CRP: No results found for: CRP      Imaging: All pertinent images and reports for the current visit were reviewed by me during this visit. I reviewed the chest x-ray/CT scan/MRI images and independently interpreted the findings and results today. CT ABDOMEN PELVIS W IV CONTRAST Additional Contrast? None   Final Result   1. No evidence of pulmonary embolus or acute pulmonary abnormality. 2.  Status post appendectomy. There is a large area of stranding and   haziness in the ventral midline omental fat, suggesting postoperative omental   infarct. Multilobulated umbilical hernia with fat stranding noted as well. No evidence of fluid collection to indicate abscess at this time. CT CHEST PULMONARY EMBOLISM W CONTRAST   Final Result   1. No evidence of pulmonary embolus or acute pulmonary abnormality. 2.  Status post appendectomy. There is a large area of stranding and   haziness in the ventral midline omental fat, suggesting postoperative omental   infarct. Multilobulated umbilical hernia with fat stranding noted as well. No evidence of fluid collection to indicate abscess at this time. XR CHEST (2 VW)   Final Result   Minimal stranding at the right base, likely atelectasis. No other acute   findings.              Outside records:    Labs, Microbiology, Radiology and pertinent results from Care everywhere, if available, were reviewed as a part ofthe consultation. Problem list:       Patient Active Problem List   Diagnosis Code    Morbid obesity due to excess calories ([facility]) E66.01    Depressive disorder F32.9    Moderate episode of recurrent major depressive disorder ([facility] [address] F33.1    Acute appendicitis with localized peritonitis, without perforation, abscess, or gangrene K35.30    Acute appendicitis K35.80    Omental infarction ([address] K55.069    Urinary tract infection without hematuria N39.0    Postoperative fever R50.82    UTI (urinary tract infection) N39.0    Sepsis ([facility] [address] E13.3    Umbilical hernia without obstruction and without gangrene K42.9    Hyponatremia E87.1    History of cannabis abuse F12.11         Please note that this chart was generated using Dragon dictation software. Although every effort was made to ensure the accuracy of this automated transcription, some errors in transcription may have occurred inadvertently. If you may need any clarification, please do not hesitate to contact me through EPIC or at the phone number provided below with my electronic signature. Any pictures or media included in this note were obtained after taking informed verbal consent from the patient and with their approval to include those in the patient's medical record.         Андрей Ovalle MD, MPH, [address] [place] [address]  6/20/2022, 12:19 PM  [facility]   [address] [facility], [address] [place] [address]  Office: [phone]  Fax: [phone]  Clinic days:  Tuesday & Thursday

## 2022-06-20 NOTE — DISCHARGE INSTR - COC
Continuity of Care Form    Patient Name: Kath Narayanan   :  1990  MRN:  [id number]    Admit date:  2022  Discharge date:  ***    Code Status Order: Full Code   Advance Directives:      Admitting Physician:  Gustavo Harris MD  PCP: Evon Dakins, APRN - CNP    Discharging Nurse: [facility] Unit/Room#: 9WX-8634/1884-30  Discharging Unit Phone Number: ***    Emergency Contact:   Extended Emergency Contact Information  Primary Emergency Contact: Alhaji Cotto  Address: [address] Phone: [phone]  Relation: Parent    Past Surgical History:  Past Surgical History:   Procedure Laterality Date    CHOLECYSTECTOMY      LAPAROSCOPIC APPENDECTOMY N/A 2022    LAPAROSCOPIC APPENDECTOMY performed by Angel Villalobos MD at [facility]      TYMPANOSTOMY TUBE PLACEMENT Bilateral     as a child       Immunization History:   Immunization History   Administered Date(s) Administered    DTP 1991, 02/10/1992, 1992, 1993, 10/17/1995    HPV Quadrivalent (Gardasil) 2009, 2010    Hepatitis B Ped/Adol (Engerix-B, Recombivax HB) 2003, 2003, 2003    Hib vaccine 1991, 02/10/1992, 1992    Influenza Vaccine, unspecified formulation 10/30/2009, 12/10/2013    Influenza Whole 02/10/2011    Influenza, High Dose (Fluzone 65 yrs and older) 12/10/2013    Influenza, Quadv, IM, PF (6 mo and older Fluzone, Flulaval, Fluarix, and 3 yrs and older Afluria) 10/04/2017, 10/27/2020    MMR 1992, 2003    Meningococcal MCV4P (Menactra) 2008    Pneumococcal Polysaccharide ([id number]) 2018    Polio OPV 1991, 02/10/1992, 1992, 1993, 10/17/1995    Tdap (Boostrix, Adacel) 2008, 2012, 2018    Tetanus Toxoid, absorbed 2003    Varicella (Varivax) 2003, 2008       Active Problems:  Patient Active Problem List Diagnosis Code    Morbid obesity due to excess calories ([facility]) E66.01    Depressive disorder F32.9    Moderate episode of recurrent major depressive disorder ([facility]) F33.1    Acute appendicitis with localized peritonitis, without perforation, abscess, or gangrene K35.30    Acute appendicitis K35.80    Omental infarction ([facility] [address] K55.069    Urinary tract infection without hematuria N39.0    Postoperative fever R50.82    UTI (urinary tract infection) N39.0    Sepsis ([facility]) K92.1    Umbilical hernia without obstruction and without gangrene K42.9    Hyponatremia E87.1    History of cannabis abuse F12.11       Isolation/Infection:   Isolation            No Isolation          Patient Infection Status       Infection Onset Added Last Indicated Last Indicated By Review Planned Expiration Resolved Resolved By    None active    Resolved    COVID-19 (Rule Out) 06/19/22 06/19/22 06/19/22 COVID-19 & Influenza Combo (Ordered)   06/19/22 Rule-Out Test Resulted            Nurse Assessment:  Last Vital Signs: /76   Pulse 80   Temp 98.6 °F (37 °C) (Oral)   Resp 16   Ht 5' 4\" (1.626 m)   Wt (!) 321 lb (145.6 kg)   LMP 06/09/2022   SpO2 98%   BMI 55.10 kg/m²     Last documented pain score (0-10 scale): Pain Level: 6  Last Weight:   Wt Readings from Last 1 Encounters:   06/18/22 (!) 321 lb (145.6 kg)     Mental Status:  {IP PT MENTAL STATUS:89982}    IV Access:  { SAMY IV ACCESS:815502036}    Nursing Mobility/ADLs:  Walking   {[facility] DME IOYL:335703134}  Transfer  {P DME NHYT:189428609}  Bathing  {P DME FJZE:259957513}  Dressing  {P DME XMAU:148838798}  Toileting  {P DME KUIN:097064341}  Feeding  {[facility] DME LNDR:780664018}  Med Admin  {P DME MMUV:538385429}  Med Delivery   { SAMY MED Delivery:917640471}    Wound Care Documentation and Therapy:  Incision 06/07/22 Abdomen Anterior (Active)   Dressing Status Clean;Dry; Intact 06/20/22 0800   Dressing/Treatment Surgical glue 06/20/22 0800   Margins Approximated 06/20/22 0800 Drainage Amount None 22 0800   Ofelia-incision Assessment Intact 22 0800   Number of days: 13        Elimination:  Continence: Bowel: {YES / Arabic:66175}  Bladder: {YES / SN:41716}  Urinary Catheter: {Urinary Catheter:229918378}   Colostomy/Ileostomy/Ileal Conduit: {YES / EO:08069}       Date of Last BM: ***  No intake or output data in the 24 hours ending 22 1515  No intake/output data recorded.     Safety Concerns:     [address] [employer] Safety Concerns:493774285}    Impairments/Disabilities:      [facility] Impairments/Disabilities:830865264}    Nutrition Therapy:  Current Nutrition Therapy:   [facility] Diet List:045967472}    Routes of Feeding: {CHP DME Other Feedings:853022858}  Liquids: {Slp liquid thickness:68416}  Daily Fluid Restriction: {CHP DME Yes amt example:365214172}  Last Modified Barium Swallow with Video (Video Swallowing Test): {Done Not Done THWN:354874384}    Treatments at the Time of Hospital Discharge:   Respiratory Treatments: ***  Oxygen Therapy:  {Therapy; copd oxygen:96920}  Ventilator:    { CC Vent ZUNL:874073813}    Rehab Therapies: {THERAPEUTIC INTERVENTION:9455152558}  Weight Bearing Status/Restrictions: [facility] Weight Bearin}  Other Medical Equipment (for information only, NOT a DME order):  {EQUIPMENT:397342389}  Other Treatments: ***    Patient's personal belongings (please select all that are sent with patient):  {CHP DME Belongings:574737935}    RN SIGNATURE:  {Esignature:437398521}    CASE MANAGEMENT/SOCIAL WORK SECTION    Inpatient Status Date: ***    Readmission Risk Assessment Score:  Readmission Risk              Risk of Unplanned Readmission:  6           Discharging to Facility/ Agency   Name:   Address:  Phone:  Fax:    Dialysis Facility (if applicable)   Name:  Address:  Dialysis Schedule:  Phone:  Fax:    / signature: {Esignature:303541918}    PHYSICIAN SECTION    Prognosis: {Prognosis:6999198852}    Condition at Discharge: [facility] Patient Condition:913598956}    Rehab Potential (if transferring to Rehab): {Prognosis:5871050565}    Recommended Labs or Other Treatments After Discharge: ***    Physician Certification: I certify the above information and transfer of Jovita Cleveland  is necessary for the continuing treatment of the diagnosis listed and that she requires {Admit to Appropriate Level of Care:93612} for {GREATER/LESS:052503857} 30 days.      Update Admission H&P: {CHP DME Changes in CEYF:415630680}    PHYSICIAN SIGNATURE:  {Esignature:672544676}

## 2022-06-20 NOTE — PROGRESS NOTES
Shift assessment completed. Temp 103F. Given Prn toradol and cool compresses given to reduce fever. Other vitals stable. Meds given as per MAR. Prn milk of magnesia given for constipation. Call light within reach. Will continue to monitor.   2115  Fever reduced to 99.9

## 2022-06-20 NOTE — PROGRESS NOTES
Bedside report given. Fevers overnight, one being as high as 103. Pt reports increased pain. Abd soft around umbilicus, but firm right above umbilicus. Softball sized protrusion noted. She reports she had \"2 BM's yesterday. \" Active bowel sounds x4 quadrants. Reviewed POC & AM meds. No questions at this time. The care plan and education has been reviewed and mutually agreed upon with the patient.

## 2022-06-21 LAB
A/G RATIO: 0.6 (ref 1.1–2.2)
ALBUMIN SERPL-MCNC: 3 G/DL (ref 3.4–5)
ALP BLD-CCNC: 74 U/L (ref 40–129)
ALT SERPL-CCNC: 15 U/L (ref 10–40)
ANION GAP SERPL CALCULATED.3IONS-SCNC: 12 MMOL/L (ref 3–16)
AST SERPL-CCNC: 15 U/L (ref 15–37)
BASOPHILS ABSOLUTE: 0 K/UL (ref 0–0.2)
BASOPHILS RELATIVE PERCENT: 0.1 %
BILIRUB SERPL-MCNC: 0.4 MG/DL (ref 0–1)
BUN BLDV-MCNC: 8 MG/DL (ref 7–20)
CALCIUM SERPL-MCNC: 8.6 MG/DL (ref 8.3–10.6)
CHLORIDE BLD-SCNC: 100 MMOL/L (ref 99–110)
CO2: 21 MMOL/L (ref 21–32)
CREAT SERPL-MCNC: 0.6 MG/DL (ref 0.6–1.1)
EOSINOPHILS ABSOLUTE: 0.1 K/UL (ref 0–0.6)
EOSINOPHILS RELATIVE PERCENT: 1.3 %
GFR AFRICAN AMERICAN: >60
GFR NON-AFRICAN AMERICAN: >60
GLUCOSE BLD-MCNC: 106 MG/DL (ref 70–99)
HCT VFR BLD CALC: 33.9 % (ref 36–48)
HEMOGLOBIN: 11.2 G/DL (ref 12–16)
LYMPHOCYTES ABSOLUTE: 1.2 K/UL (ref 1–5.1)
LYMPHOCYTES RELATIVE PERCENT: 11.2 %
MCH RBC QN AUTO: 31.9 PG (ref 26–34)
MCHC RBC AUTO-ENTMCNC: 33.1 G/DL (ref 31–36)
MCV RBC AUTO: 96.1 FL (ref 80–100)
MONOCYTES ABSOLUTE: 0.6 K/UL (ref 0–1.3)
MONOCYTES RELATIVE PERCENT: 5.9 %
MRSA SCREEN RT-PCR: NORMAL
NEUTROPHILS ABSOLUTE: 8.5 K/UL (ref 1.7–7.7)
NEUTROPHILS RELATIVE PERCENT: 81.5 %
PDW BLD-RTO: 13.7 % (ref 12.4–15.4)
PLATELET # BLD: 351 K/UL (ref 135–450)
PMV BLD AUTO: 8.1 FL (ref 5–10.5)
POTASSIUM REFLEX MAGNESIUM: 4.1 MMOL/L (ref 3.5–5.1)
RBC # BLD: 3.53 M/UL (ref 4–5.2)
REPORT: NORMAL
RESPIRATORY PANEL PCR: NORMAL
SODIUM BLD-SCNC: 133 MMOL/L (ref 136–145)
TOTAL PROTEIN: 7.9 G/DL (ref 6.4–8.2)
VANCOMYCIN RANDOM: 39.9 UG/ML
WBC # BLD: 10.4 K/UL (ref 4–11)

## 2022-06-21 PROCEDURE — 87077 CULTURE AEROBIC IDENTIFY: CPT

## 2022-06-21 PROCEDURE — 2580000003 HC RX 258: Performed by: INTERNAL MEDICINE

## 2022-06-21 PROCEDURE — 6360000002 HC RX W HCPCS: Performed by: SPECIALIST

## 2022-06-21 PROCEDURE — 6370000000 HC RX 637 (ALT 250 FOR IP): Performed by: INTERNAL MEDICINE

## 2022-06-21 PROCEDURE — 87075 CULTR BACTERIA EXCEPT BLOOD: CPT

## 2022-06-21 PROCEDURE — 1200000000 HC SEMI PRIVATE

## 2022-06-21 PROCEDURE — 6360000002 HC RX W HCPCS: Performed by: INTERNAL MEDICINE

## 2022-06-21 PROCEDURE — 6370000000 HC RX 637 (ALT 250 FOR IP): Performed by: NURSE PRACTITIONER

## 2022-06-21 PROCEDURE — 0J980ZZ DRAINAGE OF ABDOMEN SUBCUTANEOUS TISSUE AND FASCIA, OPEN APPROACH: ICD-10-PCS | Performed by: SPECIALIST

## 2022-06-21 PROCEDURE — 87205 SMEAR GRAM STAIN: CPT

## 2022-06-21 PROCEDURE — 87076 CULTURE ANAEROBE IDENT EACH: CPT

## 2022-06-21 PROCEDURE — 87070 CULTURE OTHR SPECIMN AEROBIC: CPT

## 2022-06-21 PROCEDURE — 36415 COLL VENOUS BLD VENIPUNCTURE: CPT

## 2022-06-21 PROCEDURE — 99233 SBSQ HOSP IP/OBS HIGH 50: CPT | Performed by: INTERNAL MEDICINE

## 2022-06-21 PROCEDURE — 80202 ASSAY OF VANCOMYCIN: CPT

## 2022-06-21 PROCEDURE — 87186 SC STD MICRODIL/AGAR DIL: CPT

## 2022-06-21 PROCEDURE — 99232 SBSQ HOSP IP/OBS MODERATE 35: CPT | Performed by: SPECIALIST

## 2022-06-21 PROCEDURE — 85025 COMPLETE CBC W/AUTO DIFF WBC: CPT

## 2022-06-21 PROCEDURE — 2500000003 HC RX 250 WO HCPCS: Performed by: SPECIALIST

## 2022-06-21 PROCEDURE — APPNB30 APP NON BILLABLE TIME 0-30 MINS: Performed by: NURSE PRACTITIONER

## 2022-06-21 PROCEDURE — 80053 COMPREHEN METABOLIC PANEL: CPT

## 2022-06-21 RX ORDER — LINEZOLID 600 MG/1
600 TABLET, FILM COATED ORAL EVERY 12 HOURS SCHEDULED
Status: DISCONTINUED | OUTPATIENT
Start: 2022-06-21 | End: 2022-06-23

## 2022-06-21 RX ORDER — LIDOCAINE HYDROCHLORIDE 10 MG/ML
20 INJECTION, SOLUTION EPIDURAL; INFILTRATION; INTRACAUDAL; PERINEURAL
Status: DISPENSED | OUTPATIENT
Start: 2022-06-21 | End: 2022-06-21

## 2022-06-21 RX ORDER — SODIUM HYPOCHLORITE 1.25 MG/ML
SOLUTION TOPICAL 2 TIMES DAILY
Status: DISCONTINUED | OUTPATIENT
Start: 2022-06-21 | End: 2022-06-23 | Stop reason: HOSPADM

## 2022-06-21 RX ADMIN — MEROPENEM 1000 MG: 1 INJECTION, POWDER, FOR SOLUTION INTRAVENOUS at 03:46

## 2022-06-21 RX ADMIN — HYDROCODONE BITARTRATE AND ACETAMINOPHEN 1 TABLET: 7.5; 325 TABLET ORAL at 18:46

## 2022-06-21 RX ADMIN — KETOROLAC TROMETHAMINE 30 MG: 30 INJECTION, SOLUTION INTRAMUSCULAR; INTRAVENOUS at 03:44

## 2022-06-21 RX ADMIN — VANCOMYCIN HYDROCHLORIDE 1500 MG: 10 INJECTION, POWDER, LYOPHILIZED, FOR SOLUTION INTRAVENOUS at 00:00

## 2022-06-21 RX ADMIN — ACETAMINOPHEN 650 MG: 325 TABLET ORAL at 11:29

## 2022-06-21 RX ADMIN — HYDROCODONE BITARTRATE AND ACETAMINOPHEN 1 TABLET: 7.5; 325 TABLET ORAL at 03:44

## 2022-06-21 RX ADMIN — LINEZOLID 600 MG: 600 TABLET, FILM COATED ORAL at 21:03

## 2022-06-21 RX ADMIN — KETOROLAC TROMETHAMINE 30 MG: 30 INJECTION, SOLUTION INTRAMUSCULAR; INTRAVENOUS at 11:29

## 2022-06-21 RX ADMIN — DAKIN'S SOLUTION 0.125% (QUARTER STRENGTH): 0.12 SOLUTION at 21:44

## 2022-06-21 RX ADMIN — SODIUM CHLORIDE: 9 INJECTION, SOLUTION INTRAVENOUS at 18:49

## 2022-06-21 RX ADMIN — HYDROCODONE BITARTRATE AND ACETAMINOPHEN 1 TABLET: 7.5; 325 TABLET ORAL at 11:29

## 2022-06-21 RX ADMIN — MEROPENEM 1000 MG: 1 INJECTION, POWDER, FOR SOLUTION INTRAVENOUS at 11:32

## 2022-06-21 RX ADMIN — ENOXAPARIN SODIUM 30 MG: 100 INJECTION SUBCUTANEOUS at 21:03

## 2022-06-21 RX ADMIN — VANCOMYCIN HYDROCHLORIDE 1500 MG: 10 INJECTION, POWDER, LYOPHILIZED, FOR SOLUTION INTRAVENOUS at 08:42

## 2022-06-21 RX ADMIN — MEROPENEM 1000 MG: 1 INJECTION, POWDER, FOR SOLUTION INTRAVENOUS at 18:49

## 2022-06-21 RX ADMIN — LIDOCAINE HYDROCHLORIDE 5 ML: 10 INJECTION, SOLUTION EPIDURAL; INFILTRATION; INTRACAUDAL; PERINEURAL at 15:04

## 2022-06-21 ASSESSMENT — ENCOUNTER SYMPTOMS
CHOKING: 0
ABDOMINAL PAIN: 0
EYE DISCHARGE: 0
NAUSEA: 0
TROUBLE SWALLOWING: 0
APNEA: 0
EYE REDNESS: 0
PHOTOPHOBIA: 0
COUGH: 0
COLOR CHANGE: 0
SHORTNESS OF BREATH: 0
DIARRHEA: 0
CHEST TIGHTNESS: 0
BLOOD IN STOOL: 0
FACIAL SWELLING: 0
STRIDOR: 0
RHINORRHEA: 0

## 2022-06-21 ASSESSMENT — PAIN SCALES - GENERAL
PAINLEVEL_OUTOF10: 10
PAINLEVEL_OUTOF10: 5
PAINLEVEL_OUTOF10: 6
PAINLEVEL_OUTOF10: 8

## 2022-06-21 ASSESSMENT — PAIN DESCRIPTION - DESCRIPTORS: DESCRIPTORS: ACHING;SORE

## 2022-06-21 ASSESSMENT — PAIN DESCRIPTION - ORIENTATION: ORIENTATION: MID

## 2022-06-21 ASSESSMENT — PAIN DESCRIPTION - PAIN TYPE: TYPE: CHRONIC PAIN

## 2022-06-21 ASSESSMENT — PAIN DESCRIPTION - LOCATION: LOCATION: ABDOMEN

## 2022-06-21 NOTE — PROGRESS NOTES
Bath: patient given bath supplies this AM   Linen Change: linens changed  Ice Water: new ice water given this AM     1000 Fall Rounds: LOW Fall Risk  Call light within reach and belongings at bedside. No further needs at this time.

## 2022-06-21 NOTE — PROGRESS NOTES
Hospital day #4  Patient still complains of discomfort around the umbilicus and continues to spike fevers    Vital signs are stable T-max of 100  4    Physical examination  Chest clear  Cardiovascular regular rate and rhythm  Abdomen is soft patient is developing cellulitic changes around the umbilicus incision there is no palpable mass no ballotable fluid collection or evidence of purulent drainage bowel sounds are present  Extremities without calf tenderness    Laboratories White count of 10.4 electrolytes are within normal limits    Assessment and plan patient appears to be developing a incisional wound infection based upon increasing cellulitic change around that area as well as pain despite not even being able to palpate a mass or ballotable fluid collection I think it behooves me to open the incision and probed the depths of the wound to see if there is a drainable collection we will do this at bedside under local

## 2022-06-21 NOTE — PROGRESS NOTES
PM assessment completed. Pt resting well in bed with c/o umbilical pain. PRN norco administered. Pt febrile 101.3 F. PRN toradol administered. Pt's abdomen hard and painful to touch just superior to umbilicus, rest of abdomen soft. MRSA nasal swab and Resp panel swab collected and sent to lab. No further needs voiced. Fall precautions in place, hourly rounding, call light and belongings in reach, bed in lowest position, wheels locked in place, side rails up x 2, walkways free of clutter. The care plan and education has been reviewed and mutually agreed upon with the patient.

## 2022-06-21 NOTE — PROGRESS NOTES
Infectious Diseases   Progress Note      Admission Date: 6/18/2022  Hospital Day: Hospital Day: 4   Attending: Mary Shoemaker MD  Date of service: 6/21/2022     Chief complaint/ Reason for consult:     · Sepsis on admission with high fever, tachycardia, tachypnea  · History of laparoscopic appendectomy with large area of stranding and haziness in the ventral omental fat concerning for possibility of omental infarct on CT scan  · Multiloculated umbilical hernia with fat stranding-signs of infection on clinical exam  · Mild hyponatremia    Microbiology:        I have reviewed allavailable micro lab data and cultures    · Blood culture (2/2) - collected on 6/18/2022: In process  · Urine culture  - collected on 6/18/2022: In process      Antibiotics and immunizations:       Current antibiotics: All antibiotics and their doses were reviewed by me    Recent Abx Admin                   meropenem (MERREM) 1,000 mg in sodium chloride 0.9 % 100 mL IVPB (mini-bag) (mg) 1,000 mg New Bag 06/21/22 1132     1,000 mg New Bag  0346     1,000 mg New Bag 06/20/22 2033    vancomycin (VANCOCIN) 1,500 mg in dextrose 5 % 250 mL IVPB (mg) 1,500 mg New Bag 06/21/22 0842     1,500 mg New Bag  0000     1,500 mg New Bag 06/20/22 1421                  Immunization History: All immunization history was reviewed by me today.     Immunization History   Administered Date(s) Administered    DTP 1991, 02/10/1992, 11/17/1992, 09/09/1993, 10/17/1995    HPV Quadrivalent (Gardasil) 08/18/2009, 07/19/2010    Hepatitis B Ped/Adol (Engerix-B, Recombivax HB) 04/09/2003, 08/05/2003, 12/23/2003    Hib vaccine 1991, 02/10/1992, 11/17/1992    Influenza Vaccine, unspecified formulation 10/30/2009, 12/10/2013    Influenza Whole 02/10/2011    Influenza, High Dose (Fluzone 65 yrs and older) 12/10/2013    Influenza, Quadv, IM, PF (6 mo and older Fluzone, Flulaval, Fluarix, and 3 yrs and older Afluria) 10/04/2017, 10/27/2020    MMR 12/16/1992, 04/09/2003    Meningococcal MCV4P (Menactra) 05/26/2008    Pneumococcal Polysaccharide ([id number]) 05/17/2018    Polio OPV 1991, 02/10/1992, 11/17/1992, 09/09/1993, 10/17/1995    Tdap (Boostrix, Adacel) 05/26/2008, 09/05/2012, 04/05/2018    Tetanus Toxoid, absorbed 08/05/2003    Varicella (Varivax) 04/09/2003, 01/16/2008       Known drug allergies: All allergies were reviewed and updated    No Known Allergies    Social history:     Social History:  All social andepidemiologic history was reviewed and updated by me today as needed. · Tobacco use:   reports that she has been smoking cigarettes. She started smoking about 8 years ago. She has a 0.50 pack-year smoking history. She has never used smokeless tobacco.  · Alcohol use:   reports current alcohol use of about 2.0 standard drinks of alcohol per week. · [facility]  ·  reports current drug use. Drug: Marijuana Myrtis Regulus). COVID VACCINATION AND LAB RESULT RECORDS:     Internal Administration   First Dose      Second Dose           Last COVID Lab SARS-CoV-2 (no units)   Date Value   05/05/2020 Not Detected     SARS-CoV-2 RNA, RT PCR (no units)   Date Value   06/19/2022 NOT DETECTED            Assessment:     The patient is a 33 y.o. old female who  has a past medical history of Postpartum depression (2015 & 2016), Psychiatric problem, Suicide attempt ([facility] [address] (2015), and UTI (urinary tract infection) (06/09/2016).  with following problems:    · Sepsis on admission with high fever, tachycardia, tachypnea-fever curve coming down  · Abdominal wall cellulitis involving periumbilical area-covered with vancomycin and meropenem  · History of laparoscopic appendectomy with large area of stranding and haziness in the ventral omental fat concerning for possibility of omental infarct on CT scan  · Multiloculated umbilical hernia with fat stranding-local signs of infection on clinical exam -slightly better  · Mild hyponatremia  · Moderate leukocyte esterase on urinalysis on 6/19/2022  · History of cannabis and benzodiazepine abuse with positive urine tox screen in 2015  · History of postpartum depression  · Obesity Class 3 due to excess calorie intake : Body mass index is 55.1 kg/m². ·       Discussion:      The patient had a T-max of 101.8 yesterday. She is on empiric IV vancomycin and IV meropenem. High had escalated to gram-negative coverage from cefepime to meropenem yesterday. She is tolerating it okay. Serum creatinine is 0.6    Respiratory viral PCR panel was also checked for thoroughness of work-up and it was negative    He has undergone bedside surgical I&D of the abdominal wall abscess    Plan:     Diagnostic Workup:      · Continue to follow  fever curve, WBC count and blood cultures. · Continue to monitor blood counts, liver and renal function. · Follow-up on abdominal wall abscess fluid culture    Antimicrobials:    · Will stop IV vancomycin today  · I will order p.o. linezolid 600 mg every 12 hour for empiric gram-positive coverage. Platelet count is 523,944  · Will continue IV meropenem 1 g every 8 hour for empiric gram-negative coverage  · We will follow up on the culture results and clinical progress and will make further recommendations accordingly. · Continue close vitals monitoring. · Maintain good glycemic control. · Fall precautions. Aspiration precautions. · Continue to watch for new fever or diarrhea. · DVT prophylaxis. · Discussed all above with patient and RN. Drug Monitoring:    · Continue monitoring for antibiotic toxicity as follows: CBC, CMP   · Continue to watch for following: new or worsening fever, new hypotension, hives, lip swelling and redness or purulence at vascular access sites. I/v access Management:    · Continue to monitor i.v access sites for erythema, induration, discharge or tenderness.    · As always, continue efforts to minimize tubes/lines/drains as clinically appropriate to reduce chances of line associated infections. Patient education and counseling:        · The patient was educated in detail about the side-effects of various antibiotics and things to watch for like new rashes, lip swelling, severe reaction, worsening diarrhea, break through fever etc.  · Discussed patient's condition and what to expect. All of the patient's questions were addressed in a satisfactory manner and patient verbalized understanding all instructions. Level of complexity of visit: High     Weight loss counseling:    Extensive weight loss counseling was done. It is important to set a realistic weight loss goal. First goal should be to avoid gaining more weight and staying at current weight (or within 5 percent). People at high risk of developing diabetes who are able to lose 5 percent of their body weight and maintain this weight will reduce their risk of developing diabetes by about 50 percent and reduce their blood pressure. Losing more than 15 percent of  body weight and staying at this weight is an extremely good result, even if you never reach your \"dream\" or \"ideal\" weight. Lifestyle changes including changing eating habits, substituting excess carbohydrates with proteins, stress reduction, using self-help programs like Weight Watchers®, Overeaters Anonymous®, and Take Off Pounds Sensibly (TOPS)© , following DASH diet and increasing exercise or walking briskly daily for half hour to and hour 5-7 days a week was suggested among other measures. Information was given about various weight loss education programs and their websites like www.cdc.gov/healthyweight, www.choosemyplate.gov and www.health.gov/dietaryguidelines/    TIME SPENT TODAY:     - Spent over  37 minutes on visit (including interval history, physical exam, review of data including labs, cultures, imaging, development and implementation of treatment plan and coordination of complex care).  More than 50 percent of this includes face-to-face time spent with the patient for counseling and coordination of care. Thank you for involving me in the care of your patient. I will continue to follow. If you have anyadditional questions, please do not hesitate to contact me. Subjective: Interval history: Interval history was obtained from chart review and patient/ RN. The patient remains on broad-spectrum antibiotics. Fever curve is trending downward. She is tolerating antibiotics okay     REVIEW OF SYSTEMS:      Review of Systems   Constitutional: Positive for fatigue. Negative for chills, diaphoresis and unexpected weight change. HENT: Negative for congestion, ear discharge, ear pain, facial swelling, hearing loss, rhinorrhea and trouble swallowing. Eyes: Negative for photophobia, discharge, redness and visual disturbance. Respiratory: Negative for apnea, cough, choking, chest tightness, shortness of breath and stridor. Cardiovascular: Negative for chest pain and palpitations. Gastrointestinal: Negative for abdominal pain, blood in stool, diarrhea and nausea. Endocrine: Negative for polydipsia, polyphagia and polyuria. Genitourinary: Negative for difficulty urinating, dysuria, frequency, hematuria, menstrual problem and vaginal discharge. Musculoskeletal: Negative for arthralgias, joint swelling, myalgias and neck stiffness. Skin: Negative for color change and rash. Periumbilical area redness and swelling slightly better   Allergic/Immunologic: Negative for immunocompromised state. Neurological: Negative for dizziness, seizures, speech difficulty, light-headedness and headaches. Hematological: Negative for adenopathy. Psychiatric/Behavioral: Negative for agitation, hallucinations and suicidal ideas. Past Medical History: All past medical history reviewed today.     Past Medical History:   Diagnosis Date    Postpartum depression 2015 & 2016    Psychiatric problem     Suicide attempt [facility]) 2015    overdose on depression medication    UTI (urinary tract infection) 06/09/2016       Past Surgical History: All past surgical history was reviewed today. Past Surgical History:   Procedure Laterality Date    CHOLECYSTECTOMY      LAPAROSCOPIC APPENDECTOMY N/A 6/7/2022    LAPAROSCOPIC APPENDECTOMY performed by Tara Chandler MD at [address] [place] [address]  2013    TYMPANOSTOMY TUBE PLACEMENT Bilateral     as a child       Family History: All family history was reviewed today. Problem Relation Age of Onset    No Known Problems Mother     Mental Illness Father         PTSD- [employer]    ADHD Brother     No Known Problems Maternal Aunt     No Known Problems Maternal Uncle     No Known Problems Paternal Aunt     No Known Problems Paternal Uncle     Diabetes Maternal Grandmother     Hypertension Maternal Grandmother     Glaucoma Maternal Grandfather     No Known Problems Paternal Grandmother     No Known Problems Paternal Grandfather     No Known Problems Other     Rheum Arthritis Neg Hx     Osteoarthritis Neg Hx     Asthma Neg Hx     Breast Cancer Neg Hx     Cancer Neg Hx     Heart Failure Neg Hx     High Cholesterol Neg Hx     Migraines Neg Hx     Ovarian Cancer Neg Hx     Rashes/Skin Problems Neg Hx     Seizures Neg Hx     Stroke Neg Hx     Thyroid Disease Neg Hx        Objective:       PHYSICAL EXAM:      Vitals:   Vitals:    06/21/22 0015 06/21/22 0345 06/21/22 0845 06/21/22 1208   BP: 110/69 113/73 102/67 116/68   Pulse: (!) 103 90 81 90   Resp: 18 17 16 18   Temp: (!) 101 °F (38.3 °C) (!) 101.8 °F (38.8 °C) 99.8 °F (37.7 °C) (!) 100.5 °F (38.1 °C)   TempSrc: Oral Oral Oral Oral   SpO2: 97% 98% 99% 98%   Weight:       Height:           Physical Exam  Vitals and nursing note reviewed. Constitutional:       General: She is not in acute distress. Appearance: She is well-developed. She is not diaphoretic. HENT:      Head: Normocephalic.       Right Ear: External ear normal.      Left Ear: External ear normal.      Nose: Nose normal.   Eyes:      General: No scleral icterus. Right eye: No discharge. Left eye: No discharge. Conjunctiva/sclera: Conjunctivae normal.      Pupils: Pupils are equal, round, and reactive to light. Cardiovascular:      Rate and Rhythm: Normal rate and regular rhythm. Heart sounds: No murmur heard. No friction rub. Pulmonary:      Effort: Pulmonary effort is normal.      Breath sounds: No stridor. No wheezing or rales. Chest:      Chest wall: No tenderness. Abdominal:      Palpations: Abdomen is soft. There is no mass. Tenderness: There is abdominal tenderness. There is no guarding or rebound. Comments: Surgical dressing at the abdominal wall abscess I&D site   Musculoskeletal:         General: No tenderness. Cervical back: Normal range of motion and neck supple. Lymphadenopathy:      Cervical: No cervical adenopathy. Skin:     General: Skin is warm and dry. Findings: No erythema or rash. Neurological:      Mental Status: She is alert and oriented to person, place, and time. Motor: No abnormal muscle tone. Psychiatric:         Judgment: Judgment normal.            Lines and drains: All vascular access sites are healthy with no local erythema, discharge or tenderness. Intake and output:    No intake/output data recorded. Lab Data:   All available labs and old records have been reviewed by me.     CBC:  Recent Labs     06/18/22  1742 06/19/22  0504 06/21/22  0535   WBC 9.8 9.5 10.4   RBC 3.57* 3.83* 3.53*   HGB 11.4* 12.2 11.2*   HCT 34.9* 37.6 33.9*    300 351   MCV 97.7 98.3 96.1   MCH 32.0 31.7 31.9   MCHC 32.7 32.3 33.1   RDW 13.7 14.1 13.7        BMP:  Recent Labs     06/19/22  0504 06/20/22  0505 06/21/22  0535   * 131* 133*   K 4.3 3.8 4.1    99 100   CO2 21 21 21   BUN 9 9 8   CREATININE 0.8 0.6 0.6   CALCIUM 8.7 8.7 8.6   GLUCOSE 93 102* 106*        Hepatic Function Panel:   Lab Results   Component Value Date    ALKPHOS 74 06/21/2022    ALT 15 06/21/2022    AST 15 06/21/2022    PROT 7.9 06/21/2022    BILITOT 0.4 06/21/2022    LABALBU 3.0 06/21/2022       CPK: No results found for: CKTOTAL  ESR: No results found for: SEDRATE  CRP: No results found for: CRP        Imaging: All pertinent images and reports for the current visit were reviewed by me during this visit. I reviewed the chest x-ray/CT scan/MRI images and independently interpreted the findings and results today. CT ABDOMEN PELVIS W IV CONTRAST Additional Contrast? None   Final Result   1. No evidence of pulmonary embolus or acute pulmonary abnormality. 2.  Status post appendectomy. There is a large area of stranding and   haziness in the ventral midline omental fat, suggesting postoperative omental   infarct. Multilobulated umbilical hernia with fat stranding noted as well. No evidence of fluid collection to indicate abscess at this time. CT CHEST PULMONARY EMBOLISM W CONTRAST   Final Result   1. No evidence of pulmonary embolus or acute pulmonary abnormality. 2.  Status post appendectomy. There is a large area of stranding and   haziness in the ventral midline omental fat, suggesting postoperative omental   infarct. Multilobulated umbilical hernia with fat stranding noted as well. No evidence of fluid collection to indicate abscess at this time. XR CHEST (2 VW)   Final Result   Minimal stranding at the right base, likely atelectasis. No other acute   findings. Medications: All current and past medications were reviewed.      vancomycin  1,500 mg IntraVENous Q8H    meropenem  1,000 mg IntraVENous Q8H    sodium chloride flush  10 mL IntraVENous 2 times per day    enoxaparin  30 mg SubCUTAneous BID        sodium chloride 75 mL/hr at 06/20/22 1421    sodium chloride         HYDROcodone-acetaminophen, sodium chloride flush, sodium chloride, potassium chloride **OR** potassium alternative oral replacement **OR** potassium chloride, potassium chloride, magnesium sulfate, promethazine **OR** ondansetron, magnesium hydroxide, acetaminophen **OR** acetaminophen, ketorolac      Problem list:       Patient Active Problem List   Diagnosis Code    Morbid obesity due to excess calories ([facility] E66.01    Depressive disorder F32.9    Moderate episode of recurrent major depressive disorder ([facility] [address] F33.1    Acute appendicitis with localized peritonitis, without perforation, abscess, or gangrene K35.30    Acute appendicitis K35.80    Omental infarction ([facility] [address] K55.069    Urinary tract infection without hematuria N39.0    Postoperative fever R50.82    UTI (urinary tract infection) N39.0    Sepsis ([facility] [address] P60.6    Umbilical hernia without obstruction and without gangrene K42.9    Hyponatremia E87.1    History of cannabis abuse F12.11    Weight loss counseling, encounter for Z71.3    Abdominal wall cellulitis L03.311       Please note that this chart was generated using Dragon dictation software. Although every effort was made to ensure the accuracy of this automated transcription, some errors in transcription may have occurred inadvertently. If you may need any clarification, please do not hesitate to contact me through EPIC or at the phone number provided below with my electronic signature. Any pictures or media included in this note were obtained after taking informed verbal consent from the patient and with their approval to include those in the patient's medical record.       Juno Turner MD, MPH, Anh Brooks  6/21/2022, 12:28 PM  [facility] Infectious Disease   [address] [place] [address], [address] [place] [address]  Office: [phone]  Fax: [phone]  Clinic days:  Tuesday & Thursday

## 2022-06-21 NOTE — PROGRESS NOTES
Hospitalist Progress Note      PCP: KJ Esparza - CNP    Date of Admission: 6/18/2022    Chief Complaint: Abdominal pain      Subjective:   Continues to have persistent abd pain. Some nausea, but no emesis. High grade fevers overnight with chills    Medications:  Reviewed    Infusion Medications    sodium chloride 75 mL/hr at 06/20/22 1421    sodium chloride       Scheduled Medications    linezolid  600 mg Oral 2 times per day    lidocaine  5 mL IntraDERmal Once    meropenem  1,000 mg IntraVENous Q8H    sodium chloride flush  10 mL IntraVENous 2 times per day    enoxaparin  30 mg SubCUTAneous BID     PRN Meds: HYDROcodone-acetaminophen, sodium chloride flush, sodium chloride, potassium chloride **OR** potassium alternative oral replacement **OR** potassium chloride, potassium chloride, magnesium sulfate, promethazine **OR** ondansetron, magnesium hydroxide, acetaminophen **OR** acetaminophen, ketorolac    No intake or output data in the 24 hours ending 06/21/22 1321    Exam:    /68   Pulse 90   Temp (!) 100.5 °F (38.1 °C) (Oral)   Resp 18   Ht 5' 4\" (1.626 m)   Wt (!) 321 lb (145.6 kg)   LMP 06/09/2022   SpO2 98%   BMI 55.10 kg/m²     Gen/overall appearance: Not in acute distress. Alert. Head: Normocephalic, atraumatic  Eyes: EOMI, no scleral icterus  CVS: regular rate and rhythm, Normal S1S2  Pulm: Clear to auscultation bilaterally. No crackles/wheezes  Gastrointestinal: Soft, epigastric TTP with surrounding erythema, obese, incisions healing well, no guarding or rebound  Extremities: No edema.  No erythema or warmth  Neuro: No gross focal deficits noted  Skin: Warm, dry    Labs:   Recent Labs     06/18/22  1742 06/19/22  0504 06/21/22  0535   WBC 9.8 9.5 10.4   HGB 11.4* 12.2 11.2*   HCT 34.9* 37.6 33.9*    300 351     Recent Labs     06/19/22  0504 06/20/22  0505 06/21/22  0535   * 131* 133*   K 4.3 3.8 4.1    99 100   CO2 21 21 21   BUN 9 9 8 CREATININE 0.8 0.6 0.6   CALCIUM 8.7 8.7 8.6     Recent Labs     06/18/22  1742 06/21/22  0535   AST 11* 15   ALT 12 15   BILITOT 0.6 0.4   ALKPHOS 62 74     No results for input(s): INR in the last 72 hours. Recent Labs     06/19/22  1740   TROPONINI <0.01       Assessment/Plan:    Active Hospital Problems    Diagnosis Date Noted    Weight loss counseling, encounter for [Z71.3]      Priority: Medium    Abdominal wall cellulitis [N37.604]      Priority: Medium    Sepsis ([address] [A41.9]      Priority: Medium    Umbilical hernia without obstruction and without gangrene [K42.9]      Priority: Medium    Hyponatremia [E87.1]      Priority: Medium    History of cannabis abuse [F12.11]      Priority: Medium    Omental infarction ([facility] [address] [K55.069] 06/18/2022     Priority: Medium    Urinary tract infection without hematuria [N39.0] 06/18/2022     Priority: Medium    Postoperative fever [R50.82] 06/18/2022     Priority: Medium    UTI (urinary tract infection) [N39.0] 06/18/2022     Priority: Medium    Morbid obesity due to excess calories ([address] [E66.01] 04/25/2013     Abdominal pain. Suspect 2/2 post op infection  Post op infection s/p recent appendicitis with lap appendectomy 6/7  Epigastric cellultis  Omental infarction as per CT abdomen  - IV abx broadened; per ID recs  - GS following; no surgical plans at this time  - pain management  - wound care    Shortness of breath secondary to postoperative atelectasis  Questionable UTI  - on broad spectrum abx as per above  - follow up cultures  - IVF hydration  - trend CBC    Morbid obesity      DVT Prophylaxis: lovenox   Diet: ADULT DIET;  Regular  Code Status: Full Code      Linda Dougherty MD

## 2022-06-21 NOTE — PROCEDURES
Pulmonary Function Testing      Patient name:  Reji Schofield     [address] [place] [address] Unit #:   [id number]   Date of test:  6/18/2022  Date of interpretation:   6/21/2022    Ms. Reji Schofield is a 33y.o. year-old former smoker. The spirometry data were acceptable and reproducible. Spirometry:  Flow volume loops were obstructed. The FEV-1/FVC ratio was normal. The FEV-1 was 1.3 liters (48% of predicted), which was severely decreased. The FVC was 1.77 liters (56% of predicted), which was decreased. Response to inhaled bronchodilators (albuterol) was not performed. Lung volumes:  Lung volumes were not tested by plethysmography. Diffusion capacity was not tested. Interpretation:  Pt could have obstructive airway disease though FEV1/FVC ratio was normal. Need lung volume studies to clarify this.     Comments:

## 2022-06-22 LAB
A/G RATIO: 0.6 (ref 1.1–2.2)
ALBUMIN SERPL-MCNC: 2.7 G/DL (ref 3.4–5)
ALP BLD-CCNC: 65 U/L (ref 40–129)
ALT SERPL-CCNC: 13 U/L (ref 10–40)
ANION GAP SERPL CALCULATED.3IONS-SCNC: 10 MMOL/L (ref 3–16)
AST SERPL-CCNC: 16 U/L (ref 15–37)
BILIRUB SERPL-MCNC: 0.3 MG/DL (ref 0–1)
BUN BLDV-MCNC: 8 MG/DL (ref 7–20)
CALCIUM SERPL-MCNC: 8.6 MG/DL (ref 8.3–10.6)
CHLORIDE BLD-SCNC: 101 MMOL/L (ref 99–110)
CO2: 24 MMOL/L (ref 21–32)
CREAT SERPL-MCNC: 0.7 MG/DL (ref 0.6–1.1)
GFR AFRICAN AMERICAN: >60
GFR NON-AFRICAN AMERICAN: >60
GLUCOSE BLD-MCNC: 132 MG/DL (ref 70–99)
HCT VFR BLD CALC: 30.8 % (ref 36–48)
HEMOGLOBIN: 10.6 G/DL (ref 12–16)
MCH RBC QN AUTO: 32.8 PG (ref 26–34)
MCHC RBC AUTO-ENTMCNC: 34.3 G/DL (ref 31–36)
MCV RBC AUTO: 95.5 FL (ref 80–100)
PDW BLD-RTO: 13.7 % (ref 12.4–15.4)
PLATELET # BLD: 335 K/UL (ref 135–450)
PMV BLD AUTO: 8 FL (ref 5–10.5)
POTASSIUM REFLEX MAGNESIUM: 4 MMOL/L (ref 3.5–5.1)
RBC # BLD: 3.22 M/UL (ref 4–5.2)
SODIUM BLD-SCNC: 135 MMOL/L (ref 136–145)
TOTAL PROTEIN: 7.4 G/DL (ref 6.4–8.2)
WBC # BLD: 8.5 K/UL (ref 4–11)

## 2022-06-22 PROCEDURE — 6360000002 HC RX W HCPCS: Performed by: SPECIALIST

## 2022-06-22 PROCEDURE — 1200000000 HC SEMI PRIVATE

## 2022-06-22 PROCEDURE — 94760 N-INVAS EAR/PLS OXIMETRY 1: CPT

## 2022-06-22 PROCEDURE — 6370000000 HC RX 637 (ALT 250 FOR IP): Performed by: NURSE PRACTITIONER

## 2022-06-22 PROCEDURE — 10061 I&D ABSCESS COMP/MULTIPLE: CPT | Performed by: SPECIALIST

## 2022-06-22 PROCEDURE — 6370000000 HC RX 637 (ALT 250 FOR IP): Performed by: INTERNAL MEDICINE

## 2022-06-22 PROCEDURE — 6360000002 HC RX W HCPCS: Performed by: INTERNAL MEDICINE

## 2022-06-22 PROCEDURE — 2580000003 HC RX 258: Performed by: INTERNAL MEDICINE

## 2022-06-22 PROCEDURE — 80053 COMPREHEN METABOLIC PANEL: CPT

## 2022-06-22 PROCEDURE — 99232 SBSQ HOSP IP/OBS MODERATE 35: CPT | Performed by: INTERNAL MEDICINE

## 2022-06-22 PROCEDURE — 85027 COMPLETE CBC AUTOMATED: CPT

## 2022-06-22 RX ORDER — DIPHENHYDRAMINE HYDROCHLORIDE 50 MG/ML
25 INJECTION INTRAMUSCULAR; INTRAVENOUS EVERY 6 HOURS PRN
Status: DISCONTINUED | OUTPATIENT
Start: 2022-06-22 | End: 2022-06-23 | Stop reason: HOSPADM

## 2022-06-22 RX ADMIN — KETOROLAC TROMETHAMINE 30 MG: 30 INJECTION, SOLUTION INTRAMUSCULAR; INTRAVENOUS at 19:44

## 2022-06-22 RX ADMIN — HYDROCODONE BITARTRATE AND ACETAMINOPHEN 1 TABLET: 7.5; 325 TABLET ORAL at 13:09

## 2022-06-22 RX ADMIN — DAKIN'S SOLUTION 0.125% (QUARTER STRENGTH): 0.12 SOLUTION at 22:23

## 2022-06-22 RX ADMIN — KETOROLAC TROMETHAMINE 30 MG: 30 INJECTION, SOLUTION INTRAMUSCULAR; INTRAVENOUS at 00:20

## 2022-06-22 RX ADMIN — ENOXAPARIN SODIUM 30 MG: 100 INJECTION SUBCUTANEOUS at 08:37

## 2022-06-22 RX ADMIN — MEROPENEM 1000 MG: 1 INJECTION, POWDER, FOR SOLUTION INTRAVENOUS at 03:01

## 2022-06-22 RX ADMIN — DIPHENHYDRAMINE HYDROCHLORIDE 25 MG: 50 INJECTION, SOLUTION INTRAMUSCULAR; INTRAVENOUS at 19:45

## 2022-06-22 RX ADMIN — MEROPENEM 1000 MG: 1 INJECTION, POWDER, FOR SOLUTION INTRAVENOUS at 20:05

## 2022-06-22 RX ADMIN — DAKIN'S SOLUTION 0.125% (QUARTER STRENGTH): 0.12 SOLUTION at 13:06

## 2022-06-22 RX ADMIN — LINEZOLID 600 MG: 600 TABLET, FILM COATED ORAL at 20:00

## 2022-06-22 RX ADMIN — LINEZOLID 600 MG: 600 TABLET, FILM COATED ORAL at 08:37

## 2022-06-22 RX ADMIN — KETOROLAC TROMETHAMINE 30 MG: 30 INJECTION, SOLUTION INTRAMUSCULAR; INTRAVENOUS at 08:36

## 2022-06-22 RX ADMIN — ENOXAPARIN SODIUM 30 MG: 100 INJECTION SUBCUTANEOUS at 22:04

## 2022-06-22 RX ADMIN — MEROPENEM 1000 MG: 1 INJECTION, POWDER, FOR SOLUTION INTRAVENOUS at 13:11

## 2022-06-22 ASSESSMENT — ENCOUNTER SYMPTOMS
EYE DISCHARGE: 0
COLOR CHANGE: 0
ABDOMINAL PAIN: 0
PHOTOPHOBIA: 0
RHINORRHEA: 0
EYE REDNESS: 0
SHORTNESS OF BREATH: 0
DIARRHEA: 0
FACIAL SWELLING: 0
BLOOD IN STOOL: 0
CHOKING: 0
STRIDOR: 0
CHEST TIGHTNESS: 0
APNEA: 0
TROUBLE SWALLOWING: 0
NAUSEA: 0
COUGH: 0

## 2022-06-22 ASSESSMENT — PAIN DESCRIPTION - DESCRIPTORS
DESCRIPTORS: ACHING;SORE
DESCRIPTORS: ACHING
DESCRIPTORS: ACHING;DISCOMFORT

## 2022-06-22 ASSESSMENT — PAIN DESCRIPTION - ORIENTATION
ORIENTATION: MID

## 2022-06-22 ASSESSMENT — PAIN SCALES - GENERAL
PAINLEVEL_OUTOF10: 3
PAINLEVEL_OUTOF10: 7
PAINLEVEL_OUTOF10: 6
PAINLEVEL_OUTOF10: 7
PAINLEVEL_OUTOF10: 6
PAINLEVEL_OUTOF10: 7

## 2022-06-22 ASSESSMENT — PAIN DESCRIPTION - PAIN TYPE: TYPE: SURGICAL PAIN

## 2022-06-22 ASSESSMENT — PAIN DESCRIPTION - LOCATION
LOCATION: ABDOMEN

## 2022-06-22 ASSESSMENT — PAIN DESCRIPTION - FREQUENCY: FREQUENCY: CONTINUOUS

## 2022-06-22 NOTE — PROGRESS NOTES
Patient is status post incision and drainage of an umbilical abscess her pain is diminished tremendously and her fevers have subsided the dressing is still intact with considerable amount of material on the dressings    Vital signs are stable low-grade fever    Chest clear to auscultation percussion  Cardiovascular regular rate and rhythm  Abdomen is soft without tenderness no guarding or rebound the surgical dressing is still in place  Extremities without calf tenderness  Neurologically without focal findings    White count of 8.5 H&H of 10 and 30 electrolytes are within normal limits    Assessment and plan patient's pain and fevers were associated with a postoperative incisional abscess which is now been incised and drained will begin wet-to-dry dressing changes today patient has improved clinically since the drainage of the abscess I believe she could be set up for home health care and be followed up on an outpatient basis in the surgery clinic

## 2022-06-22 NOTE — PROGRESS NOTES
Sitting up in bed resting, pt is alert and oriented and c/o abdominal pain which she rates 7/10. Given Toradol 30mg IV per pt request.  Assessment done, VSS, call light in reach, will continue to monitor.

## 2022-06-22 NOTE — PROGRESS NOTES
Hospitalist Progress Note      PCP: KJ Pressley - CNP    Date of Admission: 6/18/2022    Chief Complaint: Abdominal pain      Subjective:   S/p I&D. Tolerated well. Abd pain with some improvement. Afebrile overnight. Medications:  Reviewed    Infusion Medications    sodium chloride 75 mL/hr at 06/21/22 1849    sodium chloride       Scheduled Medications    linezolid  600 mg Oral 2 times per day    sodium hypochlorite   Irrigation BID    meropenem  1,000 mg IntraVENous Q8H    sodium chloride flush  10 mL IntraVENous 2 times per day    enoxaparin  30 mg SubCUTAneous BID     PRN Meds: HYDROcodone-acetaminophen, sodium chloride flush, sodium chloride, potassium chloride **OR** potassium alternative oral replacement **OR** potassium chloride, potassium chloride, magnesium sulfate, promethazine **OR** ondansetron, magnesium hydroxide, acetaminophen **OR** acetaminophen, ketorolac    No intake or output data in the 24 hours ending 06/22/22 1625    Exam:    /77   Pulse 83   Temp 98.2 °F (36.8 °C) (Oral)   Resp 18   Ht 5' 4\" (1.626 m)   Wt (!) 321 lb (145.6 kg)   LMP 06/09/2022   SpO2 98%   BMI 55.10 kg/m²     Gen/overall appearance: Not in acute distress. Alert. Head: Normocephalic, atraumatic  Eyes: EOMI, no scleral icterus  CVS: regular rate and rhythm, Normal S1S2  Pulm: Clear to auscultation bilaterally. No crackles/wheezes  Gastrointestinal: Soft, mid abdomen incision with overlying dressing, obese, no guarding or rebound  Extremities: No edema.  No erythema or warmth  Neuro: No gross focal deficits noted  Skin: Warm, dry    Labs:   Recent Labs     06/21/22  0535 06/22/22  0436   WBC 10.4 8.5   HGB 11.2* 10.6*   HCT 33.9* 30.8*    335     Recent Labs     06/20/22  0505 06/21/22  0535 06/22/22  0436   * 133* 135*   K 3.8 4.1 4.0   CL 99 100 101   CO2 21 21 24   BUN 9 8 8   CREATININE 0.6 0.6 0.7   CALCIUM 8.7 8.6 8.6     Recent Labs     06/21/22  0535 06/22/22  0436   AST 15 16   ALT 15 13   BILITOT 0.4 0.3   ALKPHOS 74 65     No results for input(s): INR in the last 72 hours. Recent Labs     06/19/22  1740   TROPONINI <0.01       Assessment/Plan:    Active Hospital Problems    Diagnosis Date Noted    Weight loss counseling, encounter for [Z71.3]      Priority: Medium    Abdominal wall cellulitis [R33.549]      Priority: Medium    Sepsis ([facility] [address] [A41.9]      Priority: Medium    Umbilical hernia without obstruction and without gangrene [K42.9]      Priority: Medium    Hyponatremia [E87.1]      Priority: Medium    History of cannabis abuse [F12.11]      Priority: Medium    Omental infarction ([facility] [address] [K55.069] 06/18/2022     Priority: Medium    Urinary tract infection without hematuria [N39.0] 06/18/2022     Priority: Medium    Postoperative fever [R50.82] 06/18/2022     Priority: Medium    UTI (urinary tract infection) [N39.0] 06/18/2022     Priority: Medium    Morbid obesity due to excess calories ([facility] [address] [E66.01] 04/25/2013     Abdominal pain. Suspect 2/2 post op infection. S/p I&D 6/21  Post op infection s/p recent appendicitis with lap appendectomy 6/7  Epigastric cellultis  Omental infarction as per CT abdomen  - IV abx per ID recs  - GS following for post op care  - pain management  - wound care    Shortness of breath secondary to postoperative atelectasis  Questionable UTI  - on broad spectrum abx as per above  - IVF hydration  - trend CBC    Morbid obesity      DVT Prophylaxis: lovenox   Diet: ADULT DIET;  Regular  Code Status: Full Code      Manisha Alonso MD

## 2022-06-22 NOTE — PROGRESS NOTES
Infectious Diseases   Progress Note      Admission Date: 6/18/2022  Hospital Day: Hospital Day: 5   Attending: Sabiha Goyal MD  Date of service: 6/22/2022     Chief complaint/ Reason for consult:     · Sepsis on admission with high fever, tachycardia, tachypnea  · History of laparoscopic appendectomy with large area of stranding and haziness in the ventral omental fat concerning for possibility of omental infarct on CT scan  · Multiloculated umbilical hernia with fat stranding-signs of infection on clinical exam  · Mild hyponatremia    Microbiology:        I have reviewed allavailable micro lab data and cultures    · Blood culture (2/2) - collected on 6/18/2022: In process  · Urine culture  - collected on 6/18/2022: In process      Antibiotics and immunizations:       Current antibiotics: All antibiotics and their doses were reviewed by me    Recent Abx Admin                   meropenem (MERREM) 1,000 mg in sodium chloride 0.9 % 100 mL IVPB (mini-bag) (mg) 1,000 mg New Bag 06/22/22 1311     1,000 mg New Bag  0301     1,000 mg New Bag 06/21/22 1849    linezolid (ZYVOX) tablet 600 mg (mg) 600 mg Given 06/22/22 0837     600 mg Given 06/21/22 2103                  Immunization History: All immunization history was reviewed by me today.     Immunization History   Administered Date(s) Administered    DTP 1990, 02/10/1992, 11/17/1992, 09/09/1993, 10/17/1995    HPV Quadrivalent (Gardasil) 08/18/2009, 07/19/2010    Hepatitis B Ped/Adol (Engerix-B, Recombivax HB) 04/09/2003, 08/05/2003, 12/23/2003    Hib vaccine 1991, 02/10/1992, 11/17/1992    Influenza Vaccine, unspecified formulation 10/30/2009, 12/10/2013    Influenza Whole 02/10/2011    Influenza, High Dose (Fluzone 65 yrs and older) 12/10/2013    Influenza, Ora Olvin, IM, PF (6 mo and older Fluzone, Flulaval, Fluarix, and 3 yrs and older Afluria) 10/04/2017, 10/27/2020    MMR 12/16/1992, 04/09/2003    Meningococcal MCV4P (Menactra) 05/26/2008 urinalysis on 6/19/2022  · History of cannabis and benzodiazepine abuse with positive urine tox screen in 2015  · History of postpartum depression  · Obesity Class 3 due to excess calorie intake : Body mass index is 55.1 kg/m². ·       Discussion:      The patient is on oral linezolid and IV meropenem    Fever curve is coming down. Abdominal wall abscess fluid culture from yesterday is in process. Gram stain indicates polymicrobial process    Serum creatinine 0.7. Plan:     Diagnostic Workup:    · Follow-up on abdominal abscess wound culture from yesterday  · Continue to follow  fever curve, WBC count and blood cultures. · Continue to monitor blood counts, liver and renal function. Antimicrobials:    · Continue oral linezolid 600 mg every 12 hours  · Current IV meropenem 1 g every 8 hour  · Continue to monitor her vitals closely  · We will follow up on the culture results and clinical progress and will make further recommendations accordingly. · Continue close vitals monitoring. · Maintain good glycemic control. · Fall precautions. · Aspiration precautions. · Continue to watch for new fever or diarrhea. · DVT prophylaxis. · Discussed all above with patient and RN. Drug Monitoring:    · Continue monitoring for antibiotic toxicity as follows: CBC, CMP   · Continue to watch for following: new or worsening fever, new hypotension, hives, lip swelling and redness or purulence at vascular access sites. I/v access Management:    · Continue to monitor i.v access sites for erythema, induration, discharge or tenderness. · As always, continue efforts to minimize tubes/lines/drains as clinically appropriate to reduce chances of line associated infections.     Patient education and counseling:        · The patient was educated in detail about the side-effects of various antibiotics and things to watch for like new rashes, lip swelling, severe reaction, worsening diarrhea, break through fever etc.  · Discussed patient's condition and what to expect. All of the patient's questions were addressed in a satisfactory manner and patient verbalized understanding all instructions. Thank you for involving me in the care of your patient. I will continue to follow. If you have anyadditional questions, please do not hesitate to contact me. Subjective: Interval history: Interval history was obtained from chart review and patient/ RN. She is afebrile. Fever curve is coming down. She is tolerating antibiotics okay     REVIEW OF SYSTEMS:      Review of Systems   Constitutional: Positive for fatigue. Negative for chills, diaphoresis and unexpected weight change. HENT: Negative for congestion, ear discharge, ear pain, facial swelling, hearing loss, rhinorrhea and trouble swallowing. Eyes: Negative for photophobia, discharge, redness and visual disturbance. Respiratory: Negative for apnea, cough, choking, chest tightness, shortness of breath and stridor. Cardiovascular: Negative for chest pain and palpitations. Gastrointestinal: Negative for abdominal pain, blood in stool, diarrhea and nausea. Endocrine: Negative for polydipsia, polyphagia and polyuria. Genitourinary: Negative for difficulty urinating, dysuria, frequency, hematuria, menstrual problem and vaginal discharge. Musculoskeletal: Negative for arthralgias, joint swelling, myalgias and neck stiffness. Skin: Negative for color change and rash. Allergic/Immunologic: Negative for immunocompromised state. Neurological: Negative for dizziness, seizures, speech difficulty, light-headedness and headaches. Hematological: Negative for adenopathy. Psychiatric/Behavioral: Negative for agitation, hallucinations and suicidal ideas. Past Medical History: All past medical history reviewed today.     Past Medical History:   Diagnosis Date    Postpartum depression 2015 & 2016    Psychiatric problem     Suicide attempt ([facility] [address] 2015 overdose on depression medication    UTI (urinary tract infection) 06/09/2016       Past Surgical History: All past surgical history was reviewed today. Past Surgical History:   Procedure Laterality Date    CHOLECYSTECTOMY      LAPAROSCOPIC APPENDECTOMY N/A 6/7/2022    LAPAROSCOPIC APPENDECTOMY performed by Hodan King MD at [address] [place] [address]  2013    TYMPANOSTOMY TUBE PLACEMENT Bilateral     as a child       Family History: All family history was reviewed today. Problem Relation Age of Onset    No Known Problems Mother     Mental Illness Father         PTSD- [employer]    ADHD Brother     No Known Problems Maternal Aunt     No Known Problems Maternal Uncle     No Known Problems Paternal Aunt     No Known Problems Paternal Uncle     Diabetes Maternal Grandmother     Hypertension Maternal Grandmother     Glaucoma Maternal Grandfather     No Known Problems Paternal Grandmother     No Known Problems Paternal Grandfather     No Known Problems Other     Rheum Arthritis Neg Hx     Osteoarthritis Neg Hx     Asthma Neg Hx     Breast Cancer Neg Hx     Cancer Neg Hx     Heart Failure Neg Hx     High Cholesterol Neg Hx     Migraines Neg Hx     Ovarian Cancer Neg Hx     Rashes/Skin Problems Neg Hx     Seizures Neg Hx     Stroke Neg Hx     Thyroid Disease Neg Hx        Objective:       PHYSICAL EXAM:      Vitals:   Vitals:    06/22/22 0331 06/22/22 0829 06/22/22 1116 06/22/22 1202   BP: (!) 95/57 128/76  119/77   Pulse: 83 82  83   Resp: 18 18 18 18   Temp: 98.8 °F (37.1 °C) 97.5 °F (36.4 °C)  98.2 °F (36.8 °C)   TempSrc: Oral Oral  Oral   SpO2: 98% 97% 98% 98%   Weight:       Height:           Physical Exam  Vitals and nursing note reviewed. Constitutional:       General: She is not in acute distress. Appearance: She is well-developed. She is not diaphoretic. HENT:      Head: Normocephalic.       Right Ear: External ear normal.      Left Ear: External ear normal. Nose: Nose normal.   Eyes:      General: No scleral icterus. Right eye: No discharge. Left eye: No discharge. Conjunctiva/sclera: Conjunctivae normal.      Pupils: Pupils are equal, round, and reactive to light. Cardiovascular:      Rate and Rhythm: Normal rate and regular rhythm. Heart sounds: No murmur heard. No friction rub. Pulmonary:      Effort: Pulmonary effort is normal.      Breath sounds: No stridor. No wheezing or rales. Chest:      Chest wall: No tenderness. Abdominal:      Palpations: Abdomen is soft. There is no mass. Tenderness: There is no abdominal tenderness. There is no guarding or rebound. Comments: Periumbilical surgical dressing noted   Musculoskeletal:         General: No tenderness. Cervical back: Normal range of motion and neck supple. Lymphadenopathy:      Cervical: No cervical adenopathy. Skin:     General: Skin is warm and dry. Findings: No erythema or rash. Neurological:      Mental Status: She is alert and oriented to person, place, and time. Motor: No abnormal muscle tone. Psychiatric:         Judgment: Judgment normal.            Lines and drains: All vascular access sites are healthy with no local erythema, discharge or tenderness. Intake and output:    No intake/output data recorded. Lab Data:   All available labs and old records have been reviewed by me.     CBC:  Recent Labs     06/21/22  0535 06/22/22  0436   WBC 10.4 8.5   RBC 3.53* 3.22*   HGB 11.2* 10.6*   HCT 33.9* 30.8*    335   MCV 96.1 95.5   MCH 31.9 32.8   MCHC 33.1 34.3   RDW 13.7 13.7        BMP:  Recent Labs     06/20/22  0505 06/21/22  0535 06/22/22  0436   * 133* 135*   K 3.8 4.1 4.0   CL 99 100 101   CO2 21 21 24   BUN 9 8 8   CREATININE 0.6 0.6 0.7   CALCIUM 8.7 8.6 8.6   GLUCOSE 102* 106* 132*        Hepatic Function Panel:   Lab Results   Component Value Date    ALKPHOS 65 06/22/2022    ALT 13 06/22/2022    AST 16 06/22/2022 PROT 7.4 06/22/2022    BILITOT 0.3 06/22/2022    LABALBU 2.7 06/22/2022       CPK: No results found for: CKTOTAL  ESR: No results found for: SEDRATE  CRP: No results found for: CRP        Imaging: All pertinent images and reports for the current visit were reviewed by me during this visit. I reviewed the chest x-ray/CT scan/MRI images and independently interpreted the findings and results today. CT ABDOMEN PELVIS W IV CONTRAST Additional Contrast? None   Final Result   1. No evidence of pulmonary embolus or acute pulmonary abnormality. 2.  Status post appendectomy. There is a large area of stranding and   haziness in the ventral midline omental fat, suggesting postoperative omental   infarct. Multilobulated umbilical hernia with fat stranding noted as well. No evidence of fluid collection to indicate abscess at this time. CT CHEST PULMONARY EMBOLISM W CONTRAST   Final Result   1. No evidence of pulmonary embolus or acute pulmonary abnormality. 2.  Status post appendectomy. There is a large area of stranding and   haziness in the ventral midline omental fat, suggesting postoperative omental   infarct. Multilobulated umbilical hernia with fat stranding noted as well. No evidence of fluid collection to indicate abscess at this time. XR CHEST (2 VW)   Final Result   Minimal stranding at the right base, likely atelectasis. No other acute   findings. Medications: All current and past medications were reviewed.      linezolid  600 mg Oral 2 times per day    sodium hypochlorite   Irrigation BID    meropenem  1,000 mg IntraVENous Q8H    sodium chloride flush  10 mL IntraVENous 2 times per day    enoxaparin  30 mg SubCUTAneous BID        sodium chloride 75 mL/hr at 06/21/22 0466    sodium chloride         HYDROcodone-acetaminophen, sodium chloride flush, sodium chloride, potassium chloride **OR** potassium alternative oral replacement **OR** potassium chloride, potassium chloride, magnesium sulfate, promethazine **OR** ondansetron, magnesium hydroxide, acetaminophen **OR** acetaminophen, ketorolac      Problem list:       Patient Active Problem List   Diagnosis Code    Morbid obesity due to excess calories ([facility]) E66.01    Depressive disorder F32.9    Moderate episode of recurrent major depressive disorder ([facility] [address] F33.1    Acute appendicitis with localized peritonitis, without perforation, abscess, or gangrene K35.30    Acute appendicitis K35.80    Omental infarction ([facility] [address] K55.069    Urinary tract infection without hematuria N39.0    Postoperative fever R50.82    UTI (urinary tract infection) N39.0    Sepsis ([facility] [address] O06.3    Umbilical hernia without obstruction and without gangrene K42.9    Hyponatremia E87.1    History of cannabis abuse F12.11    Weight loss counseling, encounter for Z71.3    Abdominal wall cellulitis L03.311       Please note that this chart was generated using Dragon dictation software. Although every effort was made to ensure the accuracy of this automated transcription, some errors in transcription may have occurred inadvertently. If you may need any clarification, please do not hesitate to contact me through EPIC or at the phone number provided below with my electronic signature. Any pictures or media included in this note were obtained after taking informed verbal consent from the patient and with their approval to include those in the patient's medical record.       Nery Mullen MD, MPH, FACP, [facility]  6/22/2022, 2:15 PM  [facility]   [address] [place] [address], [address] [place] [address]  Office: [phone]  Fax: [phone]  Clinic days:  Tuesday & Thursday

## 2022-06-22 NOTE — PLAN OF CARE
Problem: Discharge Planning  Goal: Discharge to home or other facility with appropriate resources  6/22/2022 0845 by Landry Vincent RN  Outcome: Progressing  6/22/2022 0253 by Kostas Bright RN  Outcome: Progressing     Problem: Pain  Goal: Verbalizes/displays adequate comfort level or baseline comfort level  6/22/2022 0845 by Landry Vincent RN  Outcome: Progressing  6/22/2022 0253 by Kostas Bright RN  Outcome: Progressing     Problem: ABCDS Injury Assessment  Goal: Absence of physical injury  6/22/2022 0845 by Landry Vincent RN  Outcome: Progressing  6/22/2022 0253 by Kostas Bright RN  Outcome: Progressing

## 2022-06-22 NOTE — CARE COORDINATION
Discharge Planning Assessment  Risk of Readmission Score: 8%    RN discharge planner met with patient to discuss reason for admission, current living situation, and potential needs at the time of discharge. Demographics/Insurance verified Yes    Current type of dwelling: [facility]    Patient from [facility]/ confirmed with: N/A    Living arrangements: Mom, Two Children    Level of function/Support: Independent    PCP: Luis Diaz    Last Visit to PCP: 06/16/2022    DME: N/A    Active with any community resources/agencies/skilled home care: N/A    Medication compliance issues: The patient states she is compliant with her medications. Financial issues that could impact healthcare: None    Tentative discharge plan: waiting on cultures, possible need for Home IV ABX, if patient needs Home IV ABX she will need will possibly fermin the infusion center.      Transportation at the time of discharge: BRYANNA BurgessN RN    [facility]  Phone: [phone]

## 2022-06-22 NOTE — CARE COORDINATION
Discharge Planning Note:    Talked with Melissa Hoang:    - Viktor is running benefits. Will continue to follow.     JM Stringer RN    [facility]  Phone: [phone]

## 2022-06-22 NOTE — PROGRESS NOTES
Physician Progress Note      Samaria Donaldson  CSN #:                  [id number]  :                       1990  ADMIT DATE:       2022 9:56 AM  DISCH DATE:  RESPONDING  PROVIDER #:        ALBERT Flores .S. Huber BRITT          QUERY TEXT:    Pt admitted with Postoperative fever and UTI. Noted documentation of Sepsis on   2022 by ordered Infectious Disease consultant. If possible, please   document in progress notes and/or discharge summary:    The medical record reflects the following:  Risk Factors: Presented with postoperative fever and UTI  Clinical Indicators: , Temp as high as 101.4 on day of admit, diagnosed   with UTI and now post-op infection. Per ID consult on : Sepsis on   admission with high fever, tachycardia, tachypnea  Treatment: ID consult, IV Fluids, IV Merrem, IV Zyvox, Maxipime, Vancocin  Options provided:  -- Sepsis confirmed present on admission  -- Sepsis ruled out  -- Other - I will add my own diagnosis  -- Disagree - Not applicable / Not valid  -- Disagree - Clinically unable to determine / Unknown  -- Refer to Clinical Documentation Reviewer    PROVIDER RESPONSE TEXT:    The diagnosis of Sepsis was confirmed as present on admission.     Query created by: Dayanara Luu on 2022 3:00 PM      Electronically signed by:  Dyan Cortés MD 2022 9:21 PM

## 2022-06-22 NOTE — PROCEDURES
Preoperative diagnosis incisional abscess in the umbilical port wound  Postoperative diagnosis same  Incision and drainage of umbilical abscess complex  Surgeon Robert  Anesthesia 20 cc 1% lidocaine  Findings approximately 300 cc of foul-smelling purulent material extending to the level of the fascia  Post procedure condition stable    The incisional wound was prepped and draped in a sterile fashion 20 cc 1% lidocaine were locally fully infiltrated then using 11 blade the incision was reopened immediately finding purulent cavity the prior surgical and incision was extended superiorly and medially in order to obtain an opening large enough to introduce 2 fingers into the depths of the abscess cavity the fascia felt to be intact 300 cc apparent material was expressed and the wound was packed with 4 x 4 gauze and a dressing x2 patient tolerated the procedure well incision

## 2022-06-22 NOTE — PROGRESS NOTES
Shift assessment completed. VSS. Meds given as per MAR. Dressing to abdomen changed. IVF on flow. Getting IV ATB. Call light within reach. Will continue to monitor.

## 2022-06-23 ENCOUNTER — HOSPITAL ENCOUNTER (EMERGENCY)
Age: 32
Discharge: HOME | End: 2022-06-24
Attending: EMERGENCY MEDICINE
Payer: COMMERCIAL

## 2022-06-23 VITALS
BODY MASS INDEX: 50.02 KG/M2 | TEMPERATURE: 99.5 F | DIASTOLIC BLOOD PRESSURE: 83 MMHG | HEIGHT: 64 IN | RESPIRATION RATE: 18 BRPM | HEART RATE: 67 BPM | WEIGHT: 293 LBS | SYSTOLIC BLOOD PRESSURE: 132 MMHG | OXYGEN SATURATION: 98 %

## 2022-06-23 DIAGNOSIS — S31.109A OPEN WOUND OF ABDOMINAL WALL, INITIAL ENCOUNTER: Primary | ICD-10-CM

## 2022-06-23 DIAGNOSIS — R21 RASH AND OTHER NONSPECIFIC SKIN ERUPTION: ICD-10-CM

## 2022-06-23 LAB
A/G RATIO: 0.5 (ref 1.1–2.2)
ALBUMIN SERPL-MCNC: 2.4 G/DL (ref 3.4–5)
ALP BLD-CCNC: 59 U/L (ref 40–129)
ALT SERPL-CCNC: 19 U/L (ref 10–40)
ANION GAP SERPL CALCULATED.3IONS-SCNC: 8 MMOL/L (ref 3–16)
AST SERPL-CCNC: 21 U/L (ref 15–37)
BILIRUB SERPL-MCNC: <0.2 MG/DL (ref 0–1)
BLOOD CULTURE, ROUTINE: NORMAL
BUN BLDV-MCNC: 10 MG/DL (ref 7–20)
CALCIUM SERPL-MCNC: 8.7 MG/DL (ref 8.3–10.6)
CHLORIDE BLD-SCNC: 106 MMOL/L (ref 99–110)
CO2: 23 MMOL/L (ref 21–32)
CREAT SERPL-MCNC: 0.7 MG/DL (ref 0.6–1.1)
CULTURE, BLOOD 2: NORMAL
GFR AFRICAN AMERICAN: >60
GFR NON-AFRICAN AMERICAN: >60
GLUCOSE BLD-MCNC: 104 MG/DL (ref 70–99)
HCT VFR BLD CALC: 32.5 % (ref 36–48)
HEMOGLOBIN: 10.7 G/DL (ref 12–16)
MCH RBC QN AUTO: 31.9 PG (ref 26–34)
MCHC RBC AUTO-ENTMCNC: 33 G/DL (ref 31–36)
MCV RBC AUTO: 96.6 FL (ref 80–100)
PDW BLD-RTO: 13.7 % (ref 12.4–15.4)
PLATELET # BLD: 370 K/UL (ref 135–450)
PMV BLD AUTO: 7.7 FL (ref 5–10.5)
POTASSIUM REFLEX MAGNESIUM: 4.7 MMOL/L (ref 3.5–5.1)
RBC # BLD: 3.36 M/UL (ref 4–5.2)
SODIUM BLD-SCNC: 137 MMOL/L (ref 136–145)
TOTAL PROTEIN: 7.5 G/DL (ref 6.4–8.2)
WBC # BLD: 6.3 K/UL (ref 4–11)

## 2022-06-23 PROCEDURE — 6370000000 HC RX 637 (ALT 250 FOR IP): Performed by: INTERNAL MEDICINE

## 2022-06-23 PROCEDURE — 85027 COMPLETE CBC AUTOMATED: CPT

## 2022-06-23 PROCEDURE — 99282 EMERGENCY DEPT VISIT SF MDM: CPT

## 2022-06-23 PROCEDURE — 2580000003 HC RX 258: Performed by: INTERNAL MEDICINE

## 2022-06-23 PROCEDURE — 6360000002 HC RX W HCPCS: Performed by: INTERNAL MEDICINE

## 2022-06-23 PROCEDURE — 99233 SBSQ HOSP IP/OBS HIGH 50: CPT | Performed by: INTERNAL MEDICINE

## 2022-06-23 PROCEDURE — 6370000000 HC RX 637 (ALT 250 FOR IP): Performed by: NURSE PRACTITIONER

## 2022-06-23 PROCEDURE — 80053 COMPREHEN METABOLIC PANEL: CPT

## 2022-06-23 PROCEDURE — 36415 COLL VENOUS BLD VENIPUNCTURE: CPT

## 2022-06-23 RX ORDER — METRONIDAZOLE 250 MG/1
500 TABLET ORAL EVERY 8 HOURS SCHEDULED
Status: DISCONTINUED | OUTPATIENT
Start: 2022-06-23 | End: 2022-06-23 | Stop reason: HOSPADM

## 2022-06-23 RX ORDER — CIPROFLOXACIN 500 MG/1
500 TABLET, FILM COATED ORAL 2 TIMES DAILY
Status: DISCONTINUED | OUTPATIENT
Start: 2022-06-23 | End: 2022-06-23 | Stop reason: HOSPADM

## 2022-06-23 RX ADMIN — MEROPENEM 1000 MG: 1 INJECTION, POWDER, FOR SOLUTION INTRAVENOUS at 11:23

## 2022-06-23 RX ADMIN — MEROPENEM 1000 MG: 1 INJECTION, POWDER, FOR SOLUTION INTRAVENOUS at 02:49

## 2022-06-23 RX ADMIN — HYDROCODONE BITARTRATE AND ACETAMINOPHEN 1 TABLET: 7.5; 325 TABLET ORAL at 11:18

## 2022-06-23 RX ADMIN — LINEZOLID 600 MG: 600 TABLET, FILM COATED ORAL at 11:19

## 2022-06-23 RX ADMIN — ENOXAPARIN SODIUM 30 MG: 100 INJECTION SUBCUTANEOUS at 11:19

## 2022-06-23 RX ADMIN — CIPROFLOXACIN 500 MG: 500 TABLET, FILM COATED ORAL at 18:01

## 2022-06-23 RX ADMIN — DAKIN'S SOLUTION 0.125% (QUARTER STRENGTH): 0.12 SOLUTION at 11:20

## 2022-06-23 RX ADMIN — METRONIDAZOLE 500 MG: 250 TABLET ORAL at 18:01

## 2022-06-23 ASSESSMENT — ENCOUNTER SYMPTOMS
PHOTOPHOBIA: 0
COUGH: 0
CHOKING: 0
STRIDOR: 0
EYE REDNESS: 0
RHINORRHEA: 0
COLOR CHANGE: 0
BLOOD IN STOOL: 0
ABDOMINAL PAIN: 0
CHEST TIGHTNESS: 0
EYE DISCHARGE: 0
APNEA: 0
TROUBLE SWALLOWING: 0
SHORTNESS OF BREATH: 0
FACIAL SWELLING: 0
NAUSEA: 0
DIARRHEA: 0

## 2022-06-23 ASSESSMENT — PAIN SCALES - GENERAL
PAINLEVEL_OUTOF10: 7
PAINLEVEL_OUTOF10: 7
PAINLEVEL_OUTOF10: 0

## 2022-06-23 ASSESSMENT — PAIN - FUNCTIONAL ASSESSMENT: PAIN_FUNCTIONAL_ASSESSMENT: 0-10

## 2022-06-23 ASSESSMENT — PAIN DESCRIPTION - FREQUENCY: FREQUENCY: CONTINUOUS

## 2022-06-23 ASSESSMENT — PAIN DESCRIPTION - DESCRIPTORS: DESCRIPTORS: ACHING

## 2022-06-23 ASSESSMENT — PAIN DESCRIPTION - ORIENTATION: ORIENTATION: MID

## 2022-06-23 ASSESSMENT — PAIN DESCRIPTION - LOCATION: LOCATION: ABDOMEN

## 2022-06-23 ASSESSMENT — PAIN DESCRIPTION - ONSET: ONSET: ON-GOING

## 2022-06-23 ASSESSMENT — PAIN DESCRIPTION - PAIN TYPE: TYPE: ACUTE PAIN

## 2022-06-23 NOTE — PROGRESS NOTES
This is the second day after incision and drainage of an incisional abscess in the umbilicus patient is in much better spirits has had no further fevers    Vital signs are stable she is afebrile    Abdominal dressing is intact patient states that they have changed the intraoral wound packing with minimal discomfort to her    Laboratories are within normal limits    Assessment and plan patient with a postoperative incisional abscess associated with a laparoscopic appendectomy performed on 6 7 the wound is now open is packed and has daily changes meanwhile that he was switched her to an oral antibiotic I suspect the patient will be discharged within the next 24 hours with outpatient follow-up

## 2022-06-23 NOTE — PROGRESS NOTES
Infectious Diseases   Progress Note      Admission Date: 6/18/2022  Hospital Day: Hospital Day: 6   Attending: Yesica Nice MD  Date of service: 6/23/2022     Chief complaint/ Reason for consult:     · Sepsis on admission with high fever, tachycardia, tachypnea  · History of laparoscopic appendectomy with large area of stranding and haziness in the ventral omental fat concerning for possibility of omental infarct on CT scan  · Multiloculated umbilical hernia with fat stranding-signs of infection on clinical exam  · Mild hyponatremia    Microbiology:        I have reviewed allavailable micro lab data and cultures    · Blood culture (2/2) - collected on 6/18/2022: In process  · Urine culture  - collected on 6/18/2022:  In process  · Abdominal wall wound culture: Collected on 6/21/2022: E. Coli    Susceptibility      Escherichia coli (1)    Antibiotic Interpretation Microscan  Method Status    ampicillin Sensitive <=2 mcg/mL BACTERIAL SUSCEPTIBILITY PANEL BY MARYANN     ampicillin-sulbactam Sensitive <=2 mcg/mL BACTERIAL SUSCEPTIBILITY PANEL BY MARYANN     ceFAZolin Sensitive <=4 mcg/mL BACTERIAL SUSCEPTIBILITY PANEL BY MARYANN     cefepime Sensitive <=0.12 mcg/mL BACTERIAL SUSCEPTIBILITY PANEL BY MARYANN     cefTRIAXone Sensitive <=0.25 mcg/mL BACTERIAL SUSCEPTIBILITY PANEL BY MARYANN     ciprofloxacin Sensitive <=0.25 mcg/mL BACTERIAL SUSCEPTIBILITY PANEL BY MARYANN     ertapenem Sensitive <=0.12 mcg/mL BACTERIAL SUSCEPTIBILITY PANEL BY MARYANN     gentamicin Sensitive <=1 mcg/mL BACTERIAL SUSCEPTIBILITY PANEL BY MARYANN     levofloxacin Sensitive <=0.12 mcg/mL BACTERIAL SUSCEPTIBILITY PANEL BY MARYANN     piperacillin-tazobactam Sensitive <=4 mcg/mL BACTERIAL SUSCEPTIBILITY PANEL BY MARYANN     trimethoprim-sulfamethoxazole Sensitive <=20 mcg/mL BACTERIAL SUSCEPTIBILITY PANEL BY MARYANN             Antibiotics and immunizations:       Current antibiotics: All antibiotics and their doses were reviewed by me    Recent Abx Admin meropenem (MERREM) 1,000 mg in sodium chloride 0.9 % 100 mL IVPB (mini-bag) (mg) 1,000 mg New Bag 06/23/22 1123     1,000 mg New Bag  0249     1,000 mg New Bag 06/22/22 2005    linezolid (ZYVOX) tablet 600 mg (mg) 600 mg Given 06/23/22 1119     600 mg Given 06/22/22 2000                  Immunization History: All immunization history was reviewed by me today. Immunization History   Administered Date(s) Administered    DTP 1991, 02/10/1992, 11/17/1992, 09/09/1993, 10/17/1995    HPV Quadrivalent (Gardasil) 08/18/2009, 07/19/2010    Hepatitis B Ped/Adol (Engerix-B, Recombivax HB) 04/09/2003, 08/05/2003, 12/23/2003    Hib vaccine 1991, 02/10/1992, 11/17/1992    Influenza Vaccine, unspecified formulation 10/30/2009, 12/10/2013    Influenza Whole 02/10/2011    Influenza, High Dose (Fluzone 65 yrs and older) 12/10/2013    Influenza, Wilder Pulse, IM, PF (6 mo and older Fluzone, Flulaval, Fluarix, and 3 yrs and older Afluria) 10/04/2017, 10/27/2020    MMR 12/16/1992, 04/09/2003    Meningococcal MCV4P (Menactra) 05/26/2008    Pneumococcal Polysaccharide ([id number]) 05/17/2018    Polio OPV 1991, 02/10/1992, 11/17/1992, 09/09/1993, 10/17/1995    Tdap (Boostrix, Adacel) 05/26/2008, 09/05/2012, 04/05/2018    Tetanus Toxoid, absorbed 08/05/2003    Varicella (Varivax) 04/09/2003, 01/16/2008       Known drug allergies: All allergies were reviewed and updated    No Known Allergies    Social history:     Social History:  All social andepidemiologic history was reviewed and updated by me today as needed. · Tobacco use:   reports that she has been smoking cigarettes. She started smoking about 8 years ago. She has a 0.50 pack-year smoking history. She has never used smokeless tobacco.  · Alcohol use:   reports current alcohol use of about 2.0 standard drinks of alcohol per week. · Currently lives in: [facility]  ·  reports current drug use. Drug: Marijuana Alex Divine).      COVID VACCINATION AND LAB RESULT RECORDS:     Internal Administration   First Dose      Second Dose           Last COVID Lab SARS-CoV-2 (no units)   Date Value   05/05/2020 Not Detected     SARS-CoV-2 RNA, RT PCR (no units)   Date Value   06/19/2022 NOT DETECTED            Assessment:     The patient is a 33 y.o. old female who  has a past medical history of Postpartum depression (2015 & 2016), Psychiatric problem, Suicide attempt ([address] (2015), and UTI (urinary tract infection) (06/09/2016). with following problems:    · Sepsis on admission with high fever, tachycardia, tachypnea-resolved  · Abdominal wall cellulitis involving periumbilical area-coverage linezolid and meropenem, s/p bedside I&D on 6/21/2022-culture grew pansensitive E. coli  · History of laparoscopic appendectomy with large area of stranding and haziness in the ventral omental fat concerning for possibility of omental infarct on CT scan  · Multiloculated umbilical hernia with fat stranding-local signs of infection on clinical exam -slightly better  · Mild hyponatremia  · Moderate leukocyte esterase on urinalysis on 6/19/2022  · History of cannabis and benzodiazepine abuse with positive urine tox screen in 2015  · History of postpartum depression  · Obesity Class 3 due to excess calorie intake : Body mass index is 55.1 kg/m².-Counseling done  ·       Discussion:      The patient is on IV linezolid and meropenem. T-max is 99.5 today. Serum creatinine 0.7. Liver functions are okay. White cell count 6300. Abdominal abscess wound culture from 6/21/2022 has grown E. coli. E. coli susceptibilities reviewed in detail. E. coli was pan susceptible    Plan:     Diagnostic Workup:      · Continue to follow  fever curve, WBC count and blood cultures. · Continue to monitor blood counts, liver and renal function. Antimicrobials:    · Will stop linezolid and meropenem  · Will order p.o. Cipro 500 mg every 12 hour  · We will order p.o.  Flagyl 500 mg every 8 hour  · Recommend a 10-day course of oral ciprofloxacin and Flagyl  · Recommend oral probiotic twice daily while on antibiotics  · Continue close vitals monitoring. · Maintain good glycemic control. · Fall precautions. · Aspiration precautions. · Continue to watch for new fever or diarrhea. · DVT prophylaxis. · Discussed all above with patient and RN. Drug Monitoring:    · Continue monitoring for antibiotic toxicity as follows: CBC, CMP, QTc interval  · Continue to watch for following: new or worsening fever, new hypotension, hives, lip swelling and redness or purulence at vascular access sites. I/v access Management:    · Continue to monitor i.v access sites for erythema, induration, discharge or tenderness. · As always, continue efforts to minimize tubes/lines/drains as clinically appropriate to reduce chances of line associated infections. Patient education and counseling:        · The patient was educated in detail about the side-effects of various antibiotics and things to watch for like new rashes, lip swelling, severe reaction, worsening diarrhea, break through fever etc.  · Discussed patient's condition and what to expect. All of the patient's questions were addressed in a satisfactory manner and patient verbalized understanding all instructions. Level of complexity of visit: High     Fluoroquinolone related instructions:     Patient instructed to watch for low or high blood sugars, muscle pains and ankle tendon pain while on Ciprofloxacin or Levofloxacin. Patient was advised to keep a sugar candy at all times as all fluoroquinolones have the potential of causing hypoglycemia. If these symptoms develops, patient was instructed to stop the antibiotic and call my office at [phone]. Use sunscreen when going in bright sun while on Ciprofloxacin or Levofloxacin as these antibiotics can cause photosensitivity. Take 1 hour before or 2 hour after dairy, calcium, iron, magnesium, aluminum or zinc.        TIME SPENT TODAY:     - Spent over  36 minutes on visit (including interval history, physical exam, review of data including labs, cultures, imaging, development and implementation of treatment plan and coordination of complex care). More than 50 percent of this includes face-to-face time spent with the patient for counseling and coordination of care. Thank you for involving me in the care of your patient. I will continue to follow. If you have anyadditional questions, please do not hesitate to contact me. Subjective: Interval history: Interval history was obtained from chart review and patient/ RN. He had a low-grade fever. She is tolerating antibiotics okay. No diarrhea     REVIEW OF SYSTEMS:      Review of Systems   Constitutional: Positive for fatigue. Negative for chills, diaphoresis and unexpected weight change. HENT: Negative for congestion, ear discharge, ear pain, facial swelling, hearing loss, rhinorrhea and trouble swallowing. Eyes: Negative for photophobia, discharge, redness and visual disturbance. Respiratory: Negative for apnea, cough, choking, chest tightness, shortness of breath and stridor. Cardiovascular: Negative for chest pain and palpitations. Gastrointestinal: Negative for abdominal pain, blood in stool, diarrhea and nausea. Endocrine: Negative for polydipsia, polyphagia and polyuria. Genitourinary: Negative for difficulty urinating, dysuria, frequency, hematuria, menstrual problem and vaginal discharge. Musculoskeletal: Negative for arthralgias, joint swelling, myalgias and neck stiffness. Skin: Negative for color change and rash. Allergic/Immunologic: Negative for immunocompromised state. Neurological: Negative for dizziness, seizures, speech difficulty, light-headedness and headaches. Hematological: Negative for adenopathy. Psychiatric/Behavioral: Negative for agitation, hallucinations and suicidal ideas.          Past Medical History: All past medical history reviewed today. Past Medical History:   Diagnosis Date    Postpartum depression 2015 & 2016    Psychiatric problem     Suicide attempt [facility]) 2015    overdose on depression medication    UTI (urinary tract infection) 06/09/2016       Past Surgical History: All past surgical history was reviewed today. Past Surgical History:   Procedure Laterality Date    CHOLECYSTECTOMY      LAPAROSCOPIC APPENDECTOMY N/A 6/7/2022    LAPAROSCOPIC APPENDECTOMY performed by Parth Jarrett MD at [place] [address]    TYMPANOSTOMY TUBE PLACEMENT Bilateral     as a child       Family History: All family history was reviewed today. Problem Relation Age of Onset    No Known Problems Mother     Mental Illness Father         PTSD- [employer]    ADHD Brother     No Known Problems Maternal Aunt     No Known Problems Maternal Uncle     No Known Problems Paternal Aunt     No Known Problems Paternal Uncle     Diabetes Maternal Grandmother     Hypertension Maternal Grandmother     Glaucoma Maternal Grandfather     No Known Problems Paternal Grandmother     No Known Problems Paternal Grandfather     No Known Problems Other     Rheum Arthritis Neg Hx     Osteoarthritis Neg Hx     Asthma Neg Hx     Breast Cancer Neg Hx     Cancer Neg Hx     Heart Failure Neg Hx     High Cholesterol Neg Hx     Migraines Neg Hx     Ovarian Cancer Neg Hx     Rashes/Skin Problems Neg Hx     Seizures Neg Hx     Stroke Neg Hx     Thyroid Disease Neg Hx        Objective:       PHYSICAL EXAM:      Vitals:   Vitals:    06/22/22 1945 06/23/22 0000 06/23/22 0344 06/23/22 1154   BP: 127/72 99/64 115/75 132/83   Pulse: 73 73 71 67   Resp: 18 18 18 18   Temp: 99.2 °F (37.3 °C) 98.5 °F (36.9 °C) 99.2 °F (37.3 °C) 99.5 °F (37.5 °C)   TempSrc: Oral Oral Oral Oral   SpO2: 98% 98% 98% 98%   Weight:       Height:           Physical Exam  Vitals and nursing note reviewed.    Constitutional: General: She is not in acute distress. Appearance: She is well-developed. She is not diaphoretic. HENT:      Head: Normocephalic. Right Ear: External ear normal.      Left Ear: External ear normal.      Nose: Nose normal.   Eyes:      General: No scleral icterus. Right eye: No discharge. Left eye: No discharge. Conjunctiva/sclera: Conjunctivae normal.      Pupils: Pupils are equal, round, and reactive to light. Cardiovascular:      Rate and Rhythm: Normal rate and regular rhythm. Heart sounds: No murmur heard. No friction rub. Pulmonary:      Effort: Pulmonary effort is normal.      Breath sounds: No stridor. No wheezing or rales. Chest:      Chest wall: No tenderness. Abdominal:      Palpations: Abdomen is soft. There is no mass. Tenderness: There is no abdominal tenderness. There is no guarding or rebound. Comments: Abdominal wound I&D site dressing noted   Musculoskeletal:         General: No tenderness. Cervical back: Normal range of motion and neck supple. Lymphadenopathy:      Cervical: No cervical adenopathy. Skin:     General: Skin is warm and dry. Findings: No erythema or rash. Neurological:      Mental Status: She is alert and oriented to person, place, and time. Motor: No abnormal muscle tone. Psychiatric:         Judgment: Judgment normal.                Lines and drains: All vascular access sites are healthy with no local erythema, discharge or tenderness. Intake and output:    No intake/output data recorded. Lab Data:   All available labs and old records have been reviewed by me.     CBC:  Recent Labs     06/21/22  0535 06/22/22  0436 06/23/22  0501   WBC 10.4 8.5 6.3   RBC 3.53* 3.22* 3.36*   HGB 11.2* 10.6* 10.7*   HCT 33.9* 30.8* 32.5*    335 370   MCV 96.1 95.5 96.6   MCH 31.9 32.8 31.9   MCHC 33.1 34.3 33.0   RDW 13.7 13.7 13.7        BMP:  Recent Labs     06/21/22  0535 06/22/22  0436 06/23/22  0501   NA 133* 135* 137   K 4.1 4.0 4.7    101 106   CO2 21 24 23   BUN 8 8 10   CREATININE 0.6 0.7 0.7   CALCIUM 8.6 8.6 8.7   GLUCOSE 106* 132* 104*        Hepatic Function Panel:   Lab Results   Component Value Date    ALKPHOS 59 06/23/2022    ALT 19 06/23/2022    AST 21 06/23/2022    PROT 7.5 06/23/2022    BILITOT <0.2 06/23/2022    LABALBU 2.4 06/23/2022       CPK: No results found for: CKTOTAL  ESR: No results found for: SEDRATE  CRP: No results found for: CRP        Imaging: All pertinent images and reports for the current visit were reviewed by me during this visit. I reviewed the chest x-ray/CT scan/MRI images and independently interpreted the findings and results today. CT ABDOMEN PELVIS W IV CONTRAST Additional Contrast? None   Final Result   1. No evidence of pulmonary embolus or acute pulmonary abnormality. 2.  Status post appendectomy. There is a large area of stranding and   haziness in the ventral midline omental fat, suggesting postoperative omental   infarct. Multilobulated umbilical hernia with fat stranding noted as well. No evidence of fluid collection to indicate abscess at this time. CT CHEST PULMONARY EMBOLISM W CONTRAST   Final Result   1. No evidence of pulmonary embolus or acute pulmonary abnormality. 2.  Status post appendectomy. There is a large area of stranding and   haziness in the ventral midline omental fat, suggesting postoperative omental   infarct. Multilobulated umbilical hernia with fat stranding noted as well. No evidence of fluid collection to indicate abscess at this time. XR CHEST (2 VW)   Final Result   Minimal stranding at the right base, likely atelectasis. No other acute   findings. Medications: All current and past medications were reviewed.      linezolid  600 mg Oral 2 times per day    sodium hypochlorite   Irrigation BID    meropenem  1,000 mg IntraVENous Q8H    sodium chloride flush  10 mL IntraVENous 2 times per day    enoxaparin  30 mg SubCUTAneous BID        sodium chloride 75 mL/hr at 06/21/22 1849    sodium chloride         diphenhydrAMINE, HYDROcodone-acetaminophen, sodium chloride flush, sodium chloride, potassium chloride **OR** potassium alternative oral replacement **OR** potassium chloride, potassium chloride, magnesium sulfate, promethazine **OR** ondansetron, magnesium hydroxide, acetaminophen **OR** acetaminophen, ketorolac      Problem list:       Patient Active Problem List   Diagnosis Code    Morbid obesity due to excess calories ([facility]) E66.01    Depressive disorder F32.9    Moderate episode of recurrent major depressive disorder ([facility] F33.1    Acute appendicitis with localized peritonitis, without perforation, abscess, or gangrene K35.30    Acute appendicitis K35.80    Omental infarction ([facility] K55.069    Urinary tract infection without hematuria N39.0    Postoperative fever R50.82    UTI (urinary tract infection) N39.0    Sepsis ([facility] V29.8    Umbilical hernia without obstruction and without gangrene K42.9    Hyponatremia E87.1    History of cannabis abuse F12.11    Weight loss counseling, encounter for Z71.3    Abdominal wall cellulitis L03.311       Please note that this chart was generated using Dragon dictation software. Although every effort was made to ensure the accuracy of this automated transcription, some errors in transcription may have occurred inadvertently. If you may need any clarification, please do not hesitate to contact me through EPIC or at the phone number provided below with my electronic signature. Any pictures or media included in this note were obtained after taking informed verbal consent from the patient and with their approval to include those in the patient's medical record.       Maria C Daniel MD, MPH, [address]  6/23/2022, 1:55 PM  [facility]   [address] [place] [address], [address] [facility], [address] [place] [address]  Office: [phone]  Fax:

## 2022-06-23 NOTE — PROGRESS NOTES
Shift assessment completed. VSS. Meds given as per MAR. New dressing applied on the abdominal sx site. Voiding. Had 1 BM. On oral& IV ATB. IVF on flow. Call light within reach. Will continue to monitor.

## 2022-06-23 NOTE — PROGRESS NOTES
Hospitalist Progress Note      PCP: KJ Crouch - CNP    Date of Admission: 6/18/2022    Chief Complaint: Abdominal pain      Subjective:   Denies abdominal pain but discomfort with dressings changes. Minimal purulent discharge. Otherwise no new acute complaints. Medications:  Reviewed    Infusion Medications    sodium chloride 75 mL/hr at 06/21/22 1849    sodium chloride       Scheduled Medications    linezolid  600 mg Oral 2 times per day    sodium hypochlorite   Irrigation BID    meropenem  1,000 mg IntraVENous Q8H    sodium chloride flush  10 mL IntraVENous 2 times per day    enoxaparin  30 mg SubCUTAneous BID     PRN Meds: diphenhydrAMINE, HYDROcodone-acetaminophen, sodium chloride flush, sodium chloride, potassium chloride **OR** potassium alternative oral replacement **OR** potassium chloride, potassium chloride, magnesium sulfate, promethazine **OR** ondansetron, magnesium hydroxide, acetaminophen **OR** acetaminophen, ketorolac    No intake or output data in the 24 hours ending 06/23/22 1246    Exam:    /83   Pulse 67   Temp 99.5 °F (37.5 °C) (Oral)   Resp 18   Ht 5' 4\" (1.626 m)   Wt (!) 321 lb (145.6 kg)   LMP 06/09/2022   SpO2 98%   BMI 55.10 kg/m²     Gen/overall appearance: Not in acute distress. Alert. Head: Normocephalic, atraumatic  Eyes: EOMI, no scleral icterus  CVS: regular rate and rhythm, Normal S1S2  Pulm: Clear to auscultation bilaterally. No crackles/wheezes  Gastrointestinal: Soft, mid abdomen incision with overlying dressing, obese, no guarding or rebound  Extremities: No edema.  No erythema or warmth  Neuro: No gross focal deficits noted  Skin: Warm, dry    Labs:   Recent Labs     06/21/22  0535 06/22/22  0436 06/23/22  0501   WBC 10.4 8.5 6.3   HGB 11.2* 10.6* 10.7*   HCT 33.9* 30.8* 32.5*    335 370     Recent Labs     06/21/22  0535 06/22/22  0436 06/23/22  0501   * 135* 137   K 4.1 4.0 4.7    101 106   CO2 21 24 23 BUN 8 8 10   CREATININE 0.6 0.7 0.7   CALCIUM 8.6 8.6 8.7     Recent Labs     06/21/22  0535 06/22/22  0436 06/23/22  0501   AST 15 16 21   ALT 15 13 19   BILITOT 0.4 0.3 <0.2   ALKPHOS 74 65 59     No results for input(s): INR in the last 72 hours. No results for input(s): Towana Ball in the last 72 hours. Assessment/Plan:    Active Hospital Problems    Diagnosis Date Noted    Weight loss counseling, encounter for [Z71.3]      Priority: Medium    Abdominal wall cellulitis [I18.018]      Priority: Medium    Sepsis ([address] [A41.9]      Priority: Medium    Umbilical hernia without obstruction and without gangrene [K42.9]      Priority: Medium    Hyponatremia [E87.1]      Priority: Medium    History of cannabis abuse [F12.11]      Priority: Medium    Omental infarction ([address] Chelle Vannia 06/18/2022     Priority: Medium    Urinary tract infection without hematuria [N39.0] 06/18/2022     Priority: Medium    Postoperative fever [R50.82] 06/18/2022     Priority: Medium    UTI (urinary tract infection) [N39.0] 06/18/2022     Priority: Medium    Morbid obesity due to excess calories ([address] [E66.01] 04/25/2013     Abdominal pain 2/2 post op infection. S/p I&D 6/21/22  Post op infection s/p recent appendicitis with lap appendectomy 6/7  Epigastric cellultis  Omental infarction as per CT abdomen  - IV abx per ID recs  - on merrem and linezolid  - follow up I&D cultures  - GS following for post op care  - pain management  - wound care    Shortness of breath secondary to postoperative atelectasis  Questionable UTI  - on broad spectrum abx as per above  - IVF hydration  - trend CBC    Morbid obesity      DVT Prophylaxis: lovenox   Diet: ADULT DIET;  Regular  Code Status: Full Code      Smita Knight MD

## 2022-06-24 ENCOUNTER — OFFICE VISIT (OUTPATIENT)
Dept: SURGERY | Age: 32
End: 2022-06-24

## 2022-06-24 VITALS
SYSTOLIC BLOOD PRESSURE: 131 MMHG | TEMPERATURE: 98.4 F | HEART RATE: 98 BPM | HEIGHT: 64 IN | BODY MASS INDEX: 50.02 KG/M2 | OXYGEN SATURATION: 99 % | DIASTOLIC BLOOD PRESSURE: 72 MMHG | RESPIRATION RATE: 18 BRPM | WEIGHT: 293 LBS

## 2022-06-24 VITALS — SYSTOLIC BLOOD PRESSURE: 118 MMHG | DIASTOLIC BLOOD PRESSURE: 80 MMHG | WEIGHT: 293 LBS | BODY MASS INDEX: 55.1 KG/M2

## 2022-06-24 DIAGNOSIS — Z09 SURGERY FOLLOW-UP: Primary | ICD-10-CM

## 2022-06-24 PROCEDURE — 99024 POSTOP FOLLOW-UP VISIT: CPT | Performed by: SURGERY

## 2022-06-24 RX ORDER — METRONIDAZOLE 500 MG/1
500 TABLET ORAL 3 TIMES DAILY
Qty: 30 TABLET | Refills: 0 | Status: SHIPPED | OUTPATIENT
Start: 2022-06-24 | End: 2022-07-04

## 2022-06-24 RX ORDER — CIPROFLOXACIN 500 MG/1
500 TABLET, FILM COATED ORAL 2 TIMES DAILY
Qty: 20 TABLET | Refills: 0 | Status: SHIPPED | OUTPATIENT
Start: 2022-06-24 | End: 2022-07-04

## 2022-06-24 ASSESSMENT — ENCOUNTER SYMPTOMS
VOMITING: 0
SORE THROAT: 0
COLOR CHANGE: 0
NAUSEA: 0
SHORTNESS OF BREATH: 0
ABDOMINAL PAIN: 1
DIARRHEA: 0
BACK PAIN: 0

## 2022-06-24 NOTE — PROGRESS NOTES
[facility]  SUBJECTIVE:    Gentry Yady   1990   33 y.o. female presents for routine postoperative followup after laparoscopic appendectomy for acute nonperforated appendicitis on June 7, 2022. Procedure itself was uneventful but large BMI of 55.1 did require a larger than typical periumbilical incision for extraction and fascial access. Recently presented to emergency department and admitted for periumbilical wound infection and patient left AMA for reasons possibly related to antibiotics per patient. Was not discharged with antibiotics for this reason and also having difficulty with wound care for which she presents to office today    Past Medical History:   Diagnosis Date    Postpartum depression 2015 & 2016    Psychiatric problem     Suicide attempt [facility]) 2015    overdose on depression medication    UTI (urinary tract infection) 06/09/2016     Past Surgical History:   Procedure Laterality Date    ABDOMEN SURGERY      CHOLECYSTECTOMY      LAPAROSCOPIC APPENDECTOMY N/A 6/7/2022    LAPAROSCOPIC APPENDECTOMY performed by Kati Lepe MD at [address] [place] [address] S  2013    TYMPANOSTOMY TUBE PLACEMENT Bilateral     as a child     Social History     Socioeconomic History    Marital status: Single     Spouse name: Not on file    Number of children: 2    Years of education: 15    Highest education level: Not on file   Occupational History    Occupation:    Tobacco Use    Smoking status: Current Every Day Smoker     Packs/day: 0.10     Years: 5.00     Pack years: 0.50     Types: Cigarettes     Start date: 2014    Smokeless tobacco: Never Used    Tobacco comment: cessation 7/16   Vaping Use    Vaping Use: Never used   Substance and Sexual Activity    Alcohol use:  Yes     Alcohol/week: 2.0 standard drinks     Types: 2 Glasses of wine per week     Comment: Social    Drug use: Not Currently     Types: Marijuana Deshawn NovemberDelfina Comment: smokes 15 joints/ week    Sexual activity: Yes     Partners: Male, Female     Birth control/protection: Condom   Other Topics Concern    Not on file   Social History Narrative    Not on file     Social Determinants of Health     Financial Resource Strain:     Difficulty of Paying Living Expenses: Not on file   Food Insecurity:     Worried About Running Out of Food in the Last Year: Not on file    Preeti of Food in the Last Year: Not on file   Transportation Needs:     Lack of Transportation (Medical): Not on file    Lack of Transportation (Non-Medical):  Not on file   Physical Activity:     Days of Exercise per Week: Not on file    Minutes of Exercise per Session: Not on file   Stress:     Feeling of Stress : Not on file   Social Connections:     Frequency of Communication with Friends and Family: Not on file    Frequency of Social Gatherings with Friends and Family: Not on file    Attends Congregation Services: Not on file    Active Member of [address] [place] [address] or Organizations: Not on file    Attends Club or Organization Meetings: Not on file    Marital Status: Not on file   Intimate Partner Violence:     Fear of Current or Ex-Partner: Not on file    Emotionally Abused: Not on file    Physically Abused: Not on file    Sexually Abused: Not on file   Housing Stability:     Unable to Pay for Housing in the Last Year: Not on file    Number of [place] in the Last Year: Not on file    Unstable Housing in the Last Year: Not on file      Family History   Problem Relation Age of Onset    No Known Problems Mother     Mental Illness Father         PTSD- [employer]    ADHD Brother     No Known Problems Maternal Aunt     No Known Problems Maternal Uncle     No Known Problems Paternal Aunt     No Known Problems Paternal Uncle     Diabetes Maternal Grandmother     Hypertension Maternal Grandmother     Glaucoma Maternal Grandfather     No Known Problems Paternal Grandmother     No Known Problems Paternal Grandfather     No Known Problems Other     Rheum Arthritis Neg Hx     Osteoarthritis Neg Hx     Asthma Neg Hx     Breast Cancer Neg Hx     Cancer Neg Hx     Heart Failure Neg Hx     High Cholesterol Neg Hx     Migraines Neg Hx     Ovarian Cancer Neg Hx     Rashes/Skin Problems Neg Hx     Seizures Neg Hx     Stroke Neg Hx     Thyroid Disease Neg Hx      Current Outpatient Medications   Medication Sig Dispense Refill    metroNIDAZOLE (FLAGYL) 500 MG tablet Take 1 tablet by mouth 3 times daily for 10 days 30 tablet 0    ciprofloxacin (CIPRO) 500 MG tablet Take 1 tablet by mouth 2 times daily for 10 days 20 tablet 0     No current facility-administered medications for this visit. No Known Allergies     Review of Systems:  Review of systems performed and negative with the exception of the above findings    OBJECTIVE:  /80   Wt (!) 321 lb (145.6 kg)   LMP 06/09/2022   BMI 55.10 kg/m²      Physical Exam:  General appearance: alert, appears stated age, cooperative and no distress  Abdomen: soft, non-distended, appropriate incisional tenderness, incisions clean dry and intact except for supraumbilical incision which is open with some drainage but minimal cellulitis    No results displayed because visit has over 200 results. Office Visit on 06/15/2022   Component Date Value Ref Range Status    TSH 06/15/2022 2.47  0.27 - 4.20 uIU/mL Final    DANIELLA 06/15/2022 Negative  Negative Final    Comment: If POSITIVE, specimen will be sent to reference laboratory  for further testing. DANIELLA specimens are screened using multiplex bead immunoassay  methodology. All results reported as positive are further tested  by indirect fluorescent assay (IFA) using Hep-2 substrate with  an IgG-specific conjugate.  The DANIELLA screen is designed to detect  antibodies against dsDNA, SS-A (Ro), SS-B (La), Currie (Sm), SmRNP,  RNP, Scl-70, Jacqueline-1, Centromere, Chromatin, and Ribosomal P.      T4 Free 06/15/2022 1.2 0.9 - 1.8 ng/dL Final    HIV Ag/Ab 06/15/2022 Non-Reactive  Non-reactive Final    HIV-1 Antibody 06/15/2022 Non-Reactive  Non-reactive Final    HIV ANTIGEN 06/15/2022 Non-Reactive  Non-reactive Final    HIV-2 Ab 06/15/2022 Non-Reactive  Non-reactive Final    Hep C Ab Interp 06/15/2022 Non-reactive  Non-reactive Final   Admission on 06/07/2022, Discharged on 06/08/2022   Component Date Value Ref Range Status    WBC 06/07/2022 16.5* 4.0 - 11.0 K/uL Final    RBC 06/07/2022 4.13  4.00 - 5.20 M/uL Final    Hemoglobin 06/07/2022 13.4  12.0 - 16.0 g/dL Final    Hematocrit 06/07/2022 43.3  36.0 - 48.0 % Final    MCV 06/07/2022 104.8* 80.0 - 100.0 fL Final    MCH 06/07/2022 32.4  26.0 - 34.0 pg Final    MCHC 06/07/2022 30.9* 31.0 - 36.0 g/dL Final    RDW 06/07/2022 17.9* 12.4 - 15.4 % Final    Platelets 19/65/0882 300  135 - 450 K/uL Final    MPV 06/07/2022 9.1  5.0 - 10.5 fL Final    Neutrophils % 06/07/2022 91.4  % Final    Lymphocytes % 06/07/2022 6.2  % Final    Monocytes % 06/07/2022 2.2  % Final    Eosinophils % 06/07/2022 0.1  % Final    Basophils % 06/07/2022 0.1  % Final    Neutrophils Absolute 06/07/2022 15.1* 1.7 - 7.7 K/uL Final    Lymphocytes Absolute 06/07/2022 1.0  1.0 - 5.1 K/uL Final    Monocytes Absolute 06/07/2022 0.4  0.0 - 1.3 K/uL Final    Eosinophils Absolute 06/07/2022 0.0  0.0 - 0.6 K/uL Final    Basophils Absolute 06/07/2022 0.0  0.0 - 0.2 K/uL Final    Sodium 06/07/2022 137  136 - 145 mmol/L Final    Potassium reflex Magnesium 06/07/2022 4.3  3.5 - 5.1 mmol/L Final    Chloride 06/07/2022 106  99 - 110 mmol/L Final    CO2 06/07/2022 17* 21 - 32 mmol/L Final    Anion Gap 06/07/2022 14  3 - 16 Final    Glucose 06/07/2022 134* 70 - 99 mg/dL Final    BUN 06/07/2022 8  7 - 20 mg/dL Final    CREATININE 06/07/2022 0.6  0.6 - 1.1 mg/dL Final    GFR Non- 06/07/2022 >60  >60 Final    Comment: >60 mL/min/1.73m2 EGFR, calc. for ages 25 and older using the  MDRD formula (not corrected for weight), is valid for stable  renal function.  GFR  06/07/2022 >60  >60 Final    Comment: Chronic Kidney Disease: less than 60 ml/min/1.73 sq.m. Kidney Failure: less than 15 ml/min/1.73 sq.m. Results valid for patients 18 years and older.  Calcium 06/07/2022 8.9  8.3 - 10.6 mg/dL Final    Total Protein 06/07/2022 6.9  6.4 - 8.2 g/dL Final    Albumin 06/07/2022 3.8  3.4 - 5.0 g/dL Final    Albumin/Globulin Ratio 06/07/2022 1.2  1.1 - 2.2 Final    Total Bilirubin 06/07/2022 0.4  0.0 - 1.0 mg/dL Final    Alkaline Phosphatase 06/07/2022 71  40 - 129 U/L Final    ALT 06/07/2022 12  10 - 40 U/L Final    Comment: Specimen hemolysis has exceeded the interference as defined by Roche. Result may be affected. Suggest recollection if clinically indicated.  AST 06/07/2022 17  15 - 37 U/L Final    Comment: Specimen hemolysis has exceeded the interference as defined by Roche. Value may be falsely increased. Suggest recollection if clinically  indicated.  Lipase 06/07/2022 12.0* 13.0 - 60.0 U/L Final    Color, UA 06/07/2022 Yellow  Straw/Yellow Final    Clarity, UA 06/07/2022 Clear  Clear Final    Glucose, Ur 06/07/2022 Negative  Negative mg/dL Final    Bilirubin Urine 06/07/2022 Negative  Negative Final    Ketones, Urine 06/07/2022 15* Negative mg/dL Final    Specific Gravity, UA 06/07/2022 >=1.030  1.005 - 1.030 Final    Blood, Urine 06/07/2022 Negative  Negative Final    pH, UA 06/07/2022 8.0  5.0 - 8.0 Final    Protein, UA 06/07/2022 30* Negative mg/dL Final    Urobilinogen, Urine 06/07/2022 0.2  <2.0 E.U./dL Final    Nitrite, Urine 06/07/2022 Negative  Negative Final    Leukocyte Esterase, Urine 06/07/2022 MODERATE* Negative Final    Microscopic Examination 06/07/2022 YES   Final    Urine Type 06/07/2022 NotGiven   Final    Urine received in a container without preservatives.     Urine Reflex to Culture 06/07/2022 Yes   Final    mmol/L Final    Anion Gap 06/08/2022 9  3 - 16 Final    Glucose 06/08/2022 114* 70 - 99 mg/dL Final    BUN 06/08/2022 11  7 - 20 mg/dL Final    CREATININE 06/08/2022 0.8  0.6 - 1.1 mg/dL Final    GFR Non- 06/08/2022 >60  >60 Final    Comment: >60 mL/min/1.73m2 EGFR, calc. for ages 25 and older using the  MDRD formula (not corrected for weight), is valid for stable  renal function.  GFR  06/08/2022 >60  >60 Final    Comment: Chronic Kidney Disease: less than 60 ml/min/1.73 sq.m. Kidney Failure: less than 15 ml/min/1.73 sq.m. Results valid for patients 18 years and older.  Calcium 06/08/2022 8.7  8.3 - 10.6 mg/dL Final    WBC 06/08/2022 14.0* 4.0 - 11.0 K/uL Final    RBC 06/08/2022 3.76* 4.00 - 5.20 M/uL Final    Hemoglobin 06/08/2022 12.0  12.0 - 16.0 g/dL Final    Hematocrit 06/08/2022 36.9  36.0 - 48.0 % Final    MCV 06/08/2022 98.1  80.0 - 100.0 fL Final    MCH 06/08/2022 31.8  26.0 - 34.0 pg Final    MCHC 06/08/2022 32.4  31.0 - 36.0 g/dL Final    RDW 06/08/2022 14.1  12.4 - 15.4 % Final    Platelets 75/67/9689 256  135 - 450 K/uL Final    MPV 06/08/2022 8.6  5.0 - 10.5 fL Final    Neutrophils % 06/08/2022 84.4  % Final    Lymphocytes % 06/08/2022 10.3  % Final    Monocytes % 06/08/2022 5.1  % Final    Eosinophils % 06/08/2022 0.1  % Final    Basophils % 06/08/2022 0.1  % Final    Neutrophils Absolute 06/08/2022 11.8* 1.7 - 7.7 K/uL Final    Lymphocytes Absolute 06/08/2022 1.4  1.0 - 5.1 K/uL Final    Monocytes Absolute 06/08/2022 0.7  0.0 - 1.3 K/uL Final    Eosinophils Absolute 06/08/2022 0.0  0.0 - 0.6 K/uL Final    Basophils Absolute 06/08/2022 0.0  0.0 - 0.2 K/uL Final       XR CHEST (2 VW)    Result Date: 6/18/2022  EXAMINATION: TWO XRAY VIEWS OF THE CHEST 6/18/2022 10:27 am COMPARISON: 06/08/2015 HISTORY: ORDERING SYSTEM PROVIDED HISTORY: Cough, post op fever. TECHNOLOGIST PROVIDED HISTORY: Reason for exam:->Cough, post op fever. Reason for Exam: Post-op problem (pt had a lap appy on 6/7/22 with Dr Casimiro Sheldon. Having increased pain in abdomen, fevers, urinary incontinence). FINDINGS: Mildly prominent heart size is unchanged. No significant vascular congestion. No focal airspace consolidation, pneumothorax, or pleural effusion. There is minimal stranding at the right base, likely atelectasis. No free air beneath the diaphragm. No evidence of acute osseous abnormality. Minimal stranding at the right base, likely atelectasis. No other acute findings. CT ABDOMEN PELVIS W IV CONTRAST Additional Contrast? None    Result Date: 6/18/2022  EXAMINATION: CTA OF THE CHEST; CT OF THE ABDOMEN AND PELVIS WITH CONTRAST 6/18/2022 11:40 am TECHNIQUE: CTA of the chest was performed after the administration of intravenous contrast.  Multiplanar reformatted images are provided for review. MIP images are provided for review. Automated exposure control, iterative reconstruction, and/or weight based adjustment of the mA/kV was utilized to reduce the radiation dose to as low as reasonably achievable.; CT of the abdomen and pelvis was performed with the administration of intravenous contrast. Multiplanar reformatted images are provided for review. Automated exposure control, iterative reconstruction, and/or weight based adjustment of the mA/kV was utilized to reduce the radiation dose to as low as reasonably achievable. COMPARISON: CT abdomen and pelvis dated 06/07/2022. HISTORY: ORDERING SYSTEM PROVIDED HISTORY: Tachycardia, elevated D-dimer, post op fever. TECHNOLOGIST PROVIDED HISTORY: Reason for exam:->Tachycardia, elevated D-dimer, post op fever. Decision Support Exception - unselect if not a suspected or confirmed emergency medical condition->Emergency Medical Condition (MA) Reason for Exam: Tachycardia, elevated D- dimer, post op fever. Recent appendectomy. FINDINGS: Pulmonary Arteries: Pulmonary arteries are adequately opacified for evaluation.   No evidence of intraluminal filling defect to suggest pulmonary embolism. Main pulmonary artery is normal in caliber. Mediastinum: No evidence of mediastinal lymphadenopathy. The heart and pericardium demonstrate no acute abnormality. There is no acute abnormality of the thoracic aorta. Lungs/pleura: The lungs are without acute process. There is mild bibasilar atelectasis. No focal consolidation or pulmonary edema. No evidence of pleural effusion or pneumothorax. Abdominal organs: The liver is within normal limits. The gallbladder is surgically absent. The spleen, pancreas, and adrenal glands are unremarkable. There is symmetric enhancement of the kidneys. No hydronephrosis or perinephric inflammation. Small calculus noted in the mid left kidney, measuring 0.8 cm, which is unchanged. GI/bowel: There is no abnormal bowel distention or focal pericolonic inflammation. No free air or ascites. Appendix is surgically absent. No fluid collection in the right lower quadrant. There is a focal area of prominent haziness and stranding in the ventral midline omental fat, measuring approximately 9 x 7 cm. There is multilobulated fat containing periumbilical hernia with stranding. No definite fluid collection in the abdominal wall or evidence of bowel herniation. Pelvis: The urinary bladder is nondistended. The uterus and adnexal structures are within normal limits. No pelvic lymphadenopathy. Retroperitoneum: The abdominal aorta is normal in caliber. There is no retroperitoneal or mesenteric lymphadenopathy. Soft Tissues/Bones: No acute or suspicious osseous abnormality. 1.  No evidence of pulmonary embolus or acute pulmonary abnormality. 2.  Status post appendectomy. There is a large area of stranding and haziness in the ventral midline omental fat, suggesting postoperative omental infarct. Multilobulated umbilical hernia with fat stranding noted as well.  No evidence of fluid collection to indicate abscess at this time. CT ABDOMEN PELVIS W IV CONTRAST Additional Contrast? None    Result Date: 6/7/2022  EXAMINATION: CT OF THE ABDOMEN AND PELVIS WITH CONTRAST 6/7/2022 9:33 am TECHNIQUE: CT of the abdomen and pelvis was performed with the administration of intravenous contrast. Multiplanar reformatted images are provided for review. Automated exposure control, iterative reconstruction, and/or weight based adjustment of the mA/kV was utilized to reduce the radiation dose to as low as reasonably achievable. COMPARISON: None. HISTORY: ORDERING SYSTEM PROVIDED HISTORY: RLQ pain TECHNOLOGIST PROVIDED HISTORY: Additional Contrast?->None Reason for exam:->RLQ pain Decision Support Exception - unselect if not a suspected or confirmed emergency medical condition->Emergency Medical Condition (MA) Reason for Exam: RLQ pain FINDINGS: Lower Chest: There is no consolidation or effusion. Organs: The liver, pancreas, spleen, kidneys and adrenals are unremarkable aside from a nonobstructing 5 mm left intrarenal calculus. Postop changes of cholecystectomy are noted. GI/Bowel: The appendix is enlarged and fluid-filled, measuring 9 mm in diameter. There is no bowel dilatation or obstruction. Pelvis: The bladder and pelvic organs are unremarkable. Peritoneum/Retroperitoneum: Mild inflammatory changes surround the aforementioned dilated appendix. A trace amount of free fluid is present within the cul-de-sac. There is no free air or adenopathy. Note is made of a retroaortic left renal vein. Bones/Soft Tissues: There is no acute fracture or aggressive osseous lesion. 1. Uncomplicated acute appendicitis. 2. Nonobstructing left nephrolithiasis.  RECOMMENDATIONS: Unavailable     CT CHEST PULMONARY EMBOLISM W CONTRAST    Result Date: 6/18/2022  EXAMINATION: CTA OF THE CHEST; CT OF THE ABDOMEN AND PELVIS WITH CONTRAST 6/18/2022 11:40 am TECHNIQUE: CTA of the chest was performed after the administration of intravenous contrast. Multiplanar reformatted images are provided for review. MIP images are provided for review. Automated exposure control, iterative reconstruction, and/or weight based adjustment of the mA/kV was utilized to reduce the radiation dose to as low as reasonably achievable.; CT of the abdomen and pelvis was performed with the administration of intravenous contrast. Multiplanar reformatted images are provided for review. Automated exposure control, iterative reconstruction, and/or weight based adjustment of the mA/kV was utilized to reduce the radiation dose to as low as reasonably achievable. COMPARISON: CT abdomen and pelvis dated 06/07/2022. HISTORY: ORDERING SYSTEM PROVIDED HISTORY: Tachycardia, elevated D-dimer, post op fever. TECHNOLOGIST PROVIDED HISTORY: Reason for exam:->Tachycardia, elevated D-dimer, post op fever. Decision Support Exception - unselect if not a suspected or confirmed emergency medical condition->Emergency Medical Condition (MA) Reason for Exam: Tachycardia, elevated D- dimer, post op fever. Recent appendectomy. FINDINGS: Pulmonary Arteries: Pulmonary arteries are adequately opacified for evaluation. No evidence of intraluminal filling defect to suggest pulmonary embolism. Main pulmonary artery is normal in caliber. Mediastinum: No evidence of mediastinal lymphadenopathy. The heart and pericardium demonstrate no acute abnormality. There is no acute abnormality of the thoracic aorta. Lungs/pleura: The lungs are without acute process. There is mild bibasilar atelectasis. No focal consolidation or pulmonary edema. No evidence of pleural effusion or pneumothorax. Abdominal organs: The liver is within normal limits. The gallbladder is surgically absent. The spleen, pancreas, and adrenal glands are unremarkable. There is symmetric enhancement of the kidneys. No hydronephrosis or perinephric inflammation. Small calculus noted in the mid left kidney, measuring 0.8 cm, which is unchanged. GI/bowel: There is no abnormal bowel distention or focal pericolonic inflammation. No free air or ascites. Appendix is surgically absent. No fluid collection in the right lower quadrant. There is a focal area of prominent haziness and stranding in the ventral midline omental fat, measuring approximately 9 x 7 cm. There is multilobulated fat containing periumbilical hernia with stranding. No definite fluid collection in the abdominal wall or evidence of bowel herniation. Pelvis: The urinary bladder is nondistended. The uterus and adnexal structures are within normal limits. No pelvic lymphadenopathy. Retroperitoneum: The abdominal aorta is normal in caliber. There is no retroperitoneal or mesenteric lymphadenopathy. Soft Tissues/Bones: No acute or suspicious osseous abnormality. 1.  No evidence of pulmonary embolus or acute pulmonary abnormality. 2.  Status post appendectomy. There is a large area of stranding and haziness in the ventral midline omental fat, suggesting postoperative omental infarct. Multilobulated umbilical hernia with fat stranding noted as well. No evidence of fluid collection to indicate abscess at this time. Pathology:  FINAL DIAGNOSIS:     Appendix, appendectomy:   - Acute appendicitis and omid-appendicitis. JINMI/JINMI       Assessment:  laparoscopic appendectomy for acute nonperforated appendicitis on June 7, 2022    Plan: Will start on oral antibiotics, nonpenicillin as patient possibly had a reaction during recent admission  Local wound care with wet-to-dry dressing 1-2 times per day, instructed on technique during office  No heavy lifting over 20lbs for 6 weeks total postop  Diet and activity as tolerated otherwise  Follow up with general surgery office next week for a wound check    Rick Palacio MD, FACS  6/24/2022  2:43 PM

## 2022-06-24 NOTE — DISCHARGE SUMMARY
[place] [id number] Medicine Discharge Summary    Patient ID: Eran Fresh      Patient's PCP: KJ Paz - CNP    Admit Date: 6/18/2022     Discharge Date: 6/23/2022    Admitting Physician: Yanni Babcock MD     Discharge Physician: Kristin Gaffney MD     Discharge Diagnoses and Hospital Course:    PATIENT LEFT AMA    Abdominal pain 2/2 post op infection. S/p I&D 6/21/22  Post op infection s/p recent appendicitis with lap appendectomy 6/7  Epigastric cellultis  Omental infarction as per CT abdomen  - IV abx per ID recs  - on merrem and linezolid  - follow up I&D cultures  - GS following for post op care  - pain management  - wound care     Shortness of breath secondary to postoperative atelectasis  Questionable UTI  - on broad spectrum abx as per above  - IVF hydration  - trend CBC     Morbid obesity       Active Hospital Problems    Diagnosis Date Noted    E coli infection [A49.8]      Priority: Medium    Encounter for medication counseling [Z71.89]      Priority: Medium    Abdominal wall cellulitis [F48.479]      Priority: Medium    Sepsis ([address] [A41.9]      Priority: Medium    Umbilical hernia without obstruction and without gangrene [K42.9]      Priority: Medium    Hyponatremia [E87.1]      Priority: Medium    History of cannabis abuse [F12.11]      Priority: Medium    Omental infarction ([facility] Bob Rolling 06/18/2022     Priority: Medium    Urinary tract infection without hematuria [N39.0] 06/18/2022     Priority: Medium    Postoperative fever [R50.82] 06/18/2022     Priority: Medium    UTI (urinary tract infection) [N39.0] 06/18/2022     Priority: Medium    Morbid obesity due to excess calories ([facility] [E66.01] 04/25/2013       Exam:     The patient was seen and examined on day of discharge and this discharge summary is in conjunction with any daily progress note from day of discharge.     Consults:     IP CONSULT TO GENERAL SURGERY  IP CONSULT TO INFECTIOUS DISEASES    Disposition:  AMA Condition:  Guarded    Code Status:  Full Code    Labs: For convenience and continuity at follow-up the following most recent labs are provided:      CBC:    Lab Results   Component Value Date    WBC 6.3 06/23/2022    HGB 10.7 06/23/2022    HCT 32.5 06/23/2022     06/23/2022       Renal:    Lab Results   Component Value Date     06/23/2022    K 4.7 06/23/2022     06/23/2022    CO2 23 06/23/2022    BUN 10 06/23/2022    CREATININE 0.7 06/23/2022    CALCIUM 8.7 06/23/2022       Discharge Medications: There are no discharge medications for this patient. Time Spent on discharge is more than 45 minutes in the examination, evaluation, counseling and review of medications and discharge plan. Signed:    Margarito Daniels MD   6/23/2022    Thank you KJ Vazquez - TERESA for the opportunity to be involved in this patient's care.

## 2022-06-24 NOTE — ED PROVIDER NOTES
abdominal pain (incision). Negative for diarrhea, nausea and vomiting. Genitourinary: Negative for difficulty urinating. Musculoskeletal: Negative for back pain and myalgias. Skin: Positive for rash and wound. Negative for color change. Neurological: Negative for light-headedness and headaches. Psychiatric/Behavioral: Negative for confusion. All other systems reviewed and are negative. Past Medical, Surgical, Family, and Social History     She has a past medical history of Postpartum depression, Psychiatric problem, Suicide attempt ([address] and UTI (urinary tract infection). She has a past surgical history that includes Cholecystectomy; Tubal ligation (2013); Tympanostomy tube placement (Bilateral); laparoscopic appendectomy (N/A, 6/7/2022); and Abdomen surgery. Her family history includes ADHD in her brother; Diabetes in her maternal grandmother; Glaucoma in her maternal grandfather; Hypertension in her maternal grandmother; Mental Illness in her father; No Known Problems in her maternal aunt, maternal uncle, mother, paternal aunt, paternal grandfather, paternal grandmother, paternal uncle, and another family member. She reports that she has been smoking cigarettes. She started smoking about 8 years ago. She has a 0.50 pack-year smoking history. She has never used smokeless tobacco. She reports current alcohol use of about 2.0 standard drinks of alcohol per week. She reports previous drug use. Drug: Marijuana Ginette Dasen). Medications     Previous Medications    No medications on file       Allergies     She has No Known Allergies. Physical Exam     INITIAL VITALS: BP: (!) 162/108, Temp: 98.4 °F (36.9 °C), Heart Rate: 98, Resp: 18, SpO2: 100 %  Physical Exam  Vitals and nursing note reviewed. Constitutional:       General: She is not in acute distress. Appearance: She is well-developed. She is not diaphoretic. HENT:      Head: Normocephalic and atraumatic.    Eyes:      Conjunctiva/sclera: Conjunctivae normal.   Cardiovascular:      Rate and Rhythm: Normal rate and regular rhythm. Heart sounds: Normal heart sounds. Pulmonary:      Effort: Pulmonary effort is normal. No respiratory distress. Breath sounds: Normal breath sounds. Abdominal:      Palpations: Abdomen is soft. Tenderness: There is no abdominal tenderness. Comments: Incision superior to the umbilicus with packing in place, mild tenderness around the wound without drainage. There is faint erythema noted on the left of the incision   Musculoskeletal:         General: No tenderness. Normal range of motion. Cervical back: Normal range of motion. Skin:     General: Skin is warm and dry. Neurological:      General: No focal deficit present. Mental Status: She is alert and oriented to person, place, and time. Psychiatric:         Mood and Affect: Mood normal.         Behavior: Behavior normal.         Thought Content: Thought content normal.         Judgment: Judgment normal.         Diagnostic Results       RADIOLOGY:  No orders to display       LABS:   No results found for this visit on 06/23/22. ED BEDSIDE ULTRASOUND:  No results found. RECENT VITALS:  BP: (!) 162/108, Temp: 98.4 °F (36.9 °C), Heart Rate: 98, Resp: 18, SpO2: 100 %     Procedures       ED Course     Nursing Notes, Past Medical Hx,Past Surgical Hx, Social Hx, Allergies, and Family Hx were reviewed. The patient was given the following medications:  No orders of the defined types were placed in this encounter. CONSULTS:  None    MEDICAL DECISION MAKING / ASSESSMENT / Shy Mike is a 33 y.o. female who presented to the emergency department with complaint of abdominal pain and rash. Patient states that her abdomen is not actually hurting but she is concerned about the I&D incision that was made on her abdomen and the packing is in place.   She came in specifically tonight because she did not feel like she was No medications on file       DISPOSITION   Discharge        [place], [place]  06/24/22 [id number]

## 2022-06-24 NOTE — PATIENT INSTRUCTIONS
Will start on oral antibiotics, nonpenicillin as patient possibly had a reaction during recent admission  Local wound care with wet-to-dry dressing 1-2 times per day, instructed on technique during office  No heavy lifting over 20lbs for 6 weeks total postop  Diet and activity as tolerated otherwise  Follow up with general surgery office next week for a wound check

## 2022-06-24 NOTE — ED PROVIDER NOTES
ED Attending Attestation Note     Date of evaluation: 6/23/2022    This patient was seen by the advance practice provider. I have seen and examined the patient, agree with the workup, evaluation, management and diagnosis. The care plan has been discussed. Patient is a 28-year-old female who presents with concern for wound care management. The patient was discharged earlier today from outside facility in the setting of omental infarction further complicated by infection. She was discharged with a open superficial wound to her abdomen with instructions for daily packing change. She is concerned about her ability to do packing change on her own and now presents to inquire about assistance. She denies any worsening pain in her wound. She denies any discharge. She denies any fevers. On examination I find a pleasant adult female, speaking in complete sentences. No increased work of breathing or accessory muscle use during respirations. Examination of the wound shows a 4 to 5 cm open wound of the mid abdomen with packing in place. There is no surrounding erythema or lymphangitic streaking. No active discharge or bleeding. Unfortunately we do not have the resources to arrange home health or home nurse assistance for dressing changes at this time of day for the patient. We did discuss the steps of packing change and the patient does have family who can assist her. We informed the patient that if it is too painful or difficult to perform on her own she can present to any local emergency department for assistance. I encouraged her to reach out to her primary care in the morning to help arrange home health or home nursing.     Kaushal Rodriguez MD MPH   Physician     Dorian Arndt MD  06/24/22 [facility]

## 2022-06-26 LAB
ANAEROBIC CULTURE: ABNORMAL
ANAEROBIC CULTURE: ABNORMAL
GRAM STAIN RESULT: ABNORMAL
ORGANISM: ABNORMAL
WOUND/ABSCESS: ABNORMAL
WOUND/ABSCESS: ABNORMAL

## 2022-07-18 PROBLEM — N39.0 UTI (URINARY TRACT INFECTION): Status: RESOLVED | Noted: 2022-06-18 | Resolved: 2022-07-18

## 2022-10-26 ENCOUNTER — OFFICE VISIT (OUTPATIENT)
Dept: PSYCHOLOGY | Age: 32
End: 2022-10-26
Payer: COMMERCIAL

## 2022-10-26 DIAGNOSIS — F41.8 DEPRESSION WITH ANXIETY: Primary | ICD-10-CM

## 2022-10-26 PROCEDURE — 4004F PT TOBACCO SCREEN RCVD TLK: CPT | Performed by: PSYCHOLOGIST

## 2022-10-26 PROCEDURE — 90791 PSYCH DIAGNOSTIC EVALUATION: CPT | Performed by: PSYCHOLOGIST
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
## 2022-10-26 NOTE — PATIENT INSTRUCTIONS
Boundaries Updated and Expanded Edition: When to Say Yes, How to Say No To Take Control of Your Life  by RAGHAV SONG and Piotr Coleman  Oct 3, 2017    Set Boundaries, Find Peace: A Guide to Reclaiming Yourself  by Raj Griffin   Mar 16, 2021

## 2022-10-27 ENCOUNTER — OFFICE VISIT (OUTPATIENT)
Dept: PSYCHIATRY | Age: 32
End: 2022-10-27
Payer: COMMERCIAL

## 2022-10-27 VITALS
BODY MASS INDEX: 50.02 KG/M2 | HEIGHT: 64 IN | HEART RATE: 98 BPM | OXYGEN SATURATION: 96 % | WEIGHT: 293 LBS | RESPIRATION RATE: 16 BRPM | DIASTOLIC BLOOD PRESSURE: 72 MMHG | SYSTOLIC BLOOD PRESSURE: 128 MMHG

## 2022-10-27 DIAGNOSIS — F12.10 CANNABIS USE DISORDER, MILD, ABUSE: ICD-10-CM

## 2022-10-27 DIAGNOSIS — F33.1 MODERATE EPISODE OF RECURRENT MAJOR DEPRESSIVE DISORDER (HCC): Primary | ICD-10-CM

## 2022-10-27 DIAGNOSIS — F43.9 TRAUMA AND STRESSOR-RELATED DISORDER: ICD-10-CM

## 2022-10-27 PROCEDURE — 4004F PT TOBACCO SCREEN RCVD TLK: CPT | Performed by: PSYCHIATRY & NEUROLOGY

## 2022-10-27 PROCEDURE — 99215 OFFICE O/P EST HI 40 MIN: CPT | Performed by: PSYCHIATRY & NEUROLOGY

## 2022-10-27 PROCEDURE — G8484 FLU IMMUNIZE NO ADMIN: HCPCS | Performed by: PSYCHIATRY & NEUROLOGY

## 2022-10-27 PROCEDURE — G8427 DOCREV CUR MEDS BY ELIG CLIN: HCPCS | Performed by: PSYCHIATRY & NEUROLOGY

## 2022-10-27 PROCEDURE — G8417 CALC BMI ABV UP PARAM F/U: HCPCS | Performed by: PSYCHIATRY & NEUROLOGY

## 2022-10-27 RX ORDER — TRAZODONE HYDROCHLORIDE 100 MG/1
100-150 TABLET ORAL NIGHTLY PRN
Qty: 45 TABLET | Refills: 2 | Status: SHIPPED | OUTPATIENT
Start: 2022-10-27

## 2022-10-27 RX ORDER — DESVENLAFAXINE 50 MG/1
50 TABLET, EXTENDED RELEASE ORAL DAILY
Qty: 30 TABLET | Refills: 2 | Status: SHIPPED | OUTPATIENT
Start: 2022-10-27 | End: 2022-11-18

## 2022-10-27 ASSESSMENT — ANXIETY QUESTIONNAIRES
4. TROUBLE RELAXING: 3
IF YOU CHECKED OFF ANY PROBLEMS ON THIS QUESTIONNAIRE, HOW DIFFICULT HAVE THESE PROBLEMS MADE IT FOR YOU TO DO YOUR WORK, TAKE CARE OF THINGS AT HOME, OR GET ALONG WITH OTHER PEOPLE: VERY DIFFICULT
2. NOT BEING ABLE TO STOP OR CONTROL WORRYING: 1
GAD7 TOTAL SCORE: 19
7. FEELING AFRAID AS IF SOMETHING AWFUL MIGHT HAPPEN: 3
3. WORRYING TOO MUCH ABOUT DIFFERENT THINGS: 3
6. BECOMING EASILY ANNOYED OR IRRITABLE: 3
1. FEELING NERVOUS, ANXIOUS, OR ON EDGE: 3
5. BEING SO RESTLESS THAT IT IS HARD TO SIT STILL: 3

## 2022-10-27 ASSESSMENT — PATIENT HEALTH QUESTIONNAIRE - PHQ9
9. THOUGHTS THAT YOU WOULD BE BETTER OFF DEAD, OR OF HURTING YOURSELF: 0
8. MOVING OR SPEAKING SO SLOWLY THAT OTHER PEOPLE COULD HAVE NOTICED. OR THE OPPOSITE, BEING SO FIGETY OR RESTLESS THAT YOU HAVE BEEN MOVING AROUND A LOT MORE THAN USUAL: 0
SUM OF ALL RESPONSES TO PHQ QUESTIONS 1-9: 11
5. POOR APPETITE OR OVEREATING: 3
SUM OF ALL RESPONSES TO PHQ9 QUESTIONS 1 & 2: 2
1. LITTLE INTEREST OR PLEASURE IN DOING THINGS: 1
3. TROUBLE FALLING OR STAYING ASLEEP: 1
7. TROUBLE CONCENTRATING ON THINGS, SUCH AS READING THE NEWSPAPER OR WATCHING TELEVISION: 1
SUM OF ALL RESPONSES TO PHQ QUESTIONS 1-9: 11
SUM OF ALL RESPONSES TO PHQ QUESTIONS 1-9: 11
10. IF YOU CHECKED OFF ANY PROBLEMS, HOW DIFFICULT HAVE THESE PROBLEMS MADE IT FOR YOU TO DO YOUR WORK, TAKE CARE OF THINGS AT HOME, OR GET ALONG WITH OTHER PEOPLE: 2
6. FEELING BAD ABOUT YOURSELF - OR THAT YOU ARE A FAILURE OR HAVE LET YOURSELF OR YOUR FAMILY DOWN: 1
4. FEELING TIRED OR HAVING LITTLE ENERGY: 3
SUM OF ALL RESPONSES TO PHQ QUESTIONS 1-9: 11
2. FEELING DOWN, DEPRESSED OR HOPELESS: 1
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
## 2022-11-18 DIAGNOSIS — F33.1 MODERATE EPISODE OF RECURRENT MAJOR DEPRESSIVE DISORDER (HCC): ICD-10-CM

## 2022-11-18 RX ORDER — TRAZODONE HYDROCHLORIDE 100 MG/1
100-150 TABLET ORAL NIGHTLY PRN
Qty: 45 TABLET | Refills: 2 | OUTPATIENT
Start: 2022-11-18

## 2022-11-18 RX ORDER — DESVENLAFAXINE 50 MG/1
TABLET, EXTENDED RELEASE ORAL
Qty: 30 TABLET | Refills: 2 | Status: SHIPPED | OUTPATIENT
Start: 2022-11-18

## 2022-11-18 NOTE — TELEPHONE ENCOUNTER
Medication:   Requested Prescriptions     Pending Prescriptions Disp Refills    traZODone (DESYREL) 100 MG tablet [Pharmacy Med Name: TRAZODONE 100 MG TABLET] 45 tablet 2     Sig: TAKE 1-1.5 TABLETS BY MOUTH NIGHTLY AS NEEDED FOR SLEEP       Last Filled:  10/27/22    Patient Phone Number: [phone] (home)     Last appt: 10/27/2022   Next appt: 11/29/2022

## 2022-11-18 NOTE — TELEPHONE ENCOUNTER
Recent Visits  Date Type Provider Dept   10/27/22 Office Visit Francisco Kaye MD [facility] [address]   06/15/22 Office Visit KJ Corrigan CNP [facility]   06/17/21 Office Visit KJ Corrigan CNP [facility]   Showing recent visits within past 540 days with a meds authorizing provider and meeting all other requirements  Future Appointments  Date Type Provider Dept   11/29/22 Appointment Francisco Kaye MD [facility] [address] [facility] future appointments within next 150 days with a meds authorizing provider and meeting all other requirements     10/27/2022

## 2022-12-20 ENCOUNTER — HOSPITAL ENCOUNTER (EMERGENCY)
Age: 32
Discharge: HOME | End: 2022-12-20
Payer: COMMERCIAL

## 2022-12-20 VITALS
SYSTOLIC BLOOD PRESSURE: 158 MMHG | DIASTOLIC BLOOD PRESSURE: 101 MMHG | RESPIRATION RATE: 16 BRPM | HEART RATE: 102 BPM | OXYGEN SATURATION: 98 % | TEMPERATURE: 99.6 F

## 2022-12-20 DIAGNOSIS — J10.1 INFLUENZA A: Primary | ICD-10-CM

## 2022-12-20 LAB
RAPID INFLUENZA  B AGN: NEGATIVE
RAPID INFLUENZA A AGN: POSITIVE

## 2022-12-20 PROCEDURE — 6370000000 HC RX 637 (ALT 250 FOR IP): Performed by: PHYSICIAN ASSISTANT

## 2022-12-20 PROCEDURE — 87804 INFLUENZA ASSAY W/OPTIC: CPT

## 2022-12-20 PROCEDURE — 99283 EMERGENCY DEPT VISIT LOW MDM: CPT

## 2022-12-20 RX ORDER — OSELTAMIVIR PHOSPHATE 75 MG/1
75 CAPSULE ORAL 2 TIMES DAILY
Qty: 10 CAPSULE | Refills: 0 | Status: SHIPPED | OUTPATIENT
Start: 2022-12-20 | End: 2022-12-25

## 2022-12-20 RX ORDER — IBUPROFEN 800 MG/1
800 TABLET ORAL ONCE
Status: COMPLETED | OUTPATIENT
Start: 2022-12-20 | End: 2022-12-20

## 2022-12-20 RX ADMIN — IBUPROFEN 800 MG: 800 TABLET, FILM COATED ORAL at 08:48

## 2022-12-20 ASSESSMENT — ENCOUNTER SYMPTOMS
RHINORRHEA: 0
NAUSEA: 0
COUGH: 1
WHEEZING: 0
SHORTNESS OF BREATH: 0
ABDOMINAL PAIN: 0
SORE THROAT: 1
DIARRHEA: 0
VOMITING: 0

## 2022-12-20 ASSESSMENT — LIFESTYLE VARIABLES
HOW OFTEN DO YOU HAVE A DRINK CONTAINING ALCOHOL: 2-3 TIMES A WEEK
HOW MANY STANDARD DRINKS CONTAINING ALCOHOL DO YOU HAVE ON A TYPICAL DAY: 1 OR 2

## 2022-12-20 ASSESSMENT — PAIN - FUNCTIONAL ASSESSMENT: PAIN_FUNCTIONAL_ASSESSMENT: NONE - DENIES PAIN

## 2022-12-20 NOTE — ED NOTES
Discharge instructions reviewed without questions. No further needs voiced at this time. Ambulatory from department in stable condition.        Amy Odell RN  12/20/22 5546

## 2022-12-20 NOTE — ED PROVIDER NOTES
[facility]        Pt Name: Sonal Sheehan  MRN: [id number]  [place] 1990  Date of evaluation: 12/20/2022  Provider: Artemio Wright PA-C  PCP: KJ Wells CNP  Note Started: 8:51 AM EST 12/20/22          SAW. I have evaluated this patient. My supervising physician was available for consultation. CHIEF COMPLAINT       Chief Complaint   Patient presents with    Influenza     Son tested + for flu Sunday, patient started with symptoms yesterday       HISTORY OF PRESENT ILLNESS   (Location, Timing/Onset, Context/Setting, Quality, Duration, Modifying Factors, Severity, Associated Signs and Symptoms)  Note limiting factors. Chief Complaint: Flu     Sonal Sheehan is a 33 y.o. female who presents for evaluation of cough congestion fevers chills body aches, headache, sore throat that started yesterday. Son sick with similar symptoms and tested positive for flu 2 days ago. She denies chest pain or shortness of breath. No abdominal pain nausea vomiting or diarrhea. She needs note for work and was requesting prescription for Tamiflu. She has no other complaints or concerns at this time. Nursing Notes were all reviewed and agreed with or any disagreements were addressed in the HPI. REVIEW OF SYSTEMS    (2-9 systems for level 4, 10 or more for level 5)     Review of Systems   Constitutional:  Positive for chills and fever. Negative for appetite change. HENT:  Positive for congestion and sore throat. Negative for rhinorrhea. Eyes:  Negative for visual disturbance. Respiratory:  Positive for cough. Negative for shortness of breath and wheezing. Cardiovascular:  Negative for chest pain. Gastrointestinal:  Negative for abdominal pain, diarrhea, nausea and vomiting. Genitourinary:  Negative for difficulty urinating, dysuria and hematuria. Musculoskeletal:  Positive for myalgias.  Negative for neck pain and neck stiffness. Skin:  Negative for rash. Neurological:  Positive for headaches. Negative for dizziness, syncope, weakness and light-headedness. Positives and Pertinent negatives as per HPI. Except as noted above in the ROS, all other systems were reviewed and negative. PAST MEDICAL HISTORY     Past Medical History:   Diagnosis Date    Postpartum depression 2015 & 2016    Psychiatric problem     Suicide attempt [facility]) 2015    overdose on depression medication    UTI (urinary tract infection) 06/09/2016         SURGICAL HISTORY     Past Surgical History:   Procedure Laterality Date    ABDOMEN SURGERY      CHOLECYSTECTOMY      LAPAROSCOPIC APPENDECTOMY N/A 6/7/2022    LAPAROSCOPIC APPENDECTOMY performed by Sebastián Champion MD at [facility]  2013    TYMPANOSTOMY TUBE PLACEMENT Bilateral     as a child         CURRENTMEDICATIONS       Previous Medications    DESVENLAFAXINE SUCCINATE (PRISTIQ) 50 MG TB24 EXTENDED RELEASE TABLET    TAKE 1 TABLET BY MOUTH EVERY DAY    TRAZODONE (DESYREL) 100 MG TABLET    Take 1-1.5 tablets by mouth nightly as needed for Sleep         ALLERGIES     Patient has no known allergies.     FAMILYHISTORY       Family History   Problem Relation Age of Onset    No Known Problems Mother     Mental Illness Father         PTSD- [employer]    ADHD Brother     No Known Problems Maternal Aunt     No Known Problems Maternal Uncle     No Known Problems Paternal Aunt     No Known Problems Paternal Uncle     Diabetes Maternal Grandmother     Hypertension Maternal Grandmother     Glaucoma Maternal Grandfather     No Known Problems Paternal Grandmother     No Known Problems Paternal Grandfather     No Known Problems Other     Rheum Arthritis Neg Hx     Osteoarthritis Neg Hx     Asthma Neg Hx     Breast Cancer Neg Hx     Cancer Neg Hx     Heart Failure Neg Hx     High Cholesterol Neg Hx     Migraines Neg Hx     Ovarian Cancer Neg Hx     Rashes/Skin Problems Neg Hx     Seizures Neg Hx Stroke Neg Hx     Thyroid Disease Neg Hx           SOCIAL HISTORY       Social History     Tobacco Use    Smoking status: Every Day     Packs/day: 0.10     Years: 5.00     Pack years: 0.50     Types: Cigarettes     Start date: 2014    Smokeless tobacco: Never    Tobacco comments:     cessation 7/16   Vaping Use    Vaping Use: Never used   Substance Use Topics    Alcohol use: Yes     Alcohol/week: 2.0 standard drinks     Types: 2 Glasses of wine per week     Comment: Social    Drug use: Not Currently     Types: Marijuana (Weed)     Comment: smokes 15 joints/ week       SCREENINGS        Luther Coma Scale  Eye Opening: Spontaneous  Best Verbal Response: Oriented  Best Motor Response: Obeys commands  Luther Coma Scale Score: 15                CIWA Assessment  BP: (!) 158/101  Heart Rate: (!) 102             PHYSICAL EXAM    (up to 7 for level 4, 8 or more for level 5)     ED Triage Vitals [12/20/22 0830]   BP Temp Temp src Heart Rate Resp SpO2 Height Weight   (!) 158/101 99.6 °F (37.6 °C) -- (!) 102 16 98 % -- --       Physical Exam  Vitals and nursing note reviewed. Constitutional:       General: She is not in acute distress. Appearance: She is well-developed. She is ill-appearing. She is not toxic-appearing or diaphoretic. HENT:      Head: Normocephalic and atraumatic. Right Ear: External ear normal.      Left Ear: External ear normal.      Nose: Congestion present. No rhinorrhea. Mouth/Throat:      Mouth: Mucous membranes are moist.      Pharynx: No oropharyngeal exudate or posterior oropharyngeal erythema. Eyes:      General:         Right eye: No discharge. Left eye: No discharge. Cardiovascular:      Rate and Rhythm: Regular rhythm. Tachycardia present. Heart sounds: Normal heart sounds. Pulmonary:      Effort: Pulmonary effort is normal. No respiratory distress. Breath sounds: Normal breath sounds. Chest:      Chest wall: No tenderness.    Abdominal:      General: There is no distension. Palpations: Abdomen is soft. Tenderness: There is no abdominal tenderness. Musculoskeletal:         General: Normal range of motion. Cervical back: Normal range of motion and neck supple. No rigidity or tenderness. Lymphadenopathy:      Cervical: No cervical adenopathy. Skin:     General: Skin is warm and dry. Neurological:      Mental Status: She is alert and oriented to person, place, and time. Psychiatric:         Behavior: Behavior normal.       DIAGNOSTIC RESULTS   LABS:    Labs Reviewed   RAPID INFLUENZA A/B ANTIGENS - Abnormal; Notable for the following components:       Result Value    Rapid Influenza A Ag POSITIVE (*)     All other components within normal limits       When ordered only abnormal lab results are displayed. All other labs were within normal range or not returned as of this dictation. EKG: When ordered, EKG's are interpreted by the Emergency Department Physician in the absence of a cardiologist.  Please see their note for interpretation of EKG. RADIOLOGY:   Non-plain film images such as CT, Ultrasound and MRI are read by the radiologist. Plain radiographic images are visualized and preliminarily interpreted by the ED Provider with the below findings:        Interpretation per the Radiologist below, if available at the time of this note:    No orders to display     No results found. No results found. PROCEDURES   Unless otherwise noted below, none     Procedures    CRITICAL CARE TIME       CONSULTS:  None      EMERGENCY DEPARTMENT COURSE and DIFFERENTIAL DIAGNOSIS/MDM:   Vitals:    Vitals:    12/20/22 0830   BP: (!) 158/101   Pulse: (!) 102   Resp: 16   Temp: 99.6 °F (37.6 °C)   SpO2: 98%       Patient was given the following medications:  Medications   ibuprofen (ADVIL;MOTRIN) tablet 800 mg (800 mg Oral Given 12/20/22 0848)         Is this patient to be included in the SEP-1 Core Measure due to severe sepsis or septic shock?    No Exclusion criteria - the patient is NOT to be included for SEP-1 Core Measure due to:  Viral etiology found or highly suspected (including COVID-19) without concomitant bacterial infection    Patient presents for evaluation of cold symptoms body aches fevers and chills. On exam, she looks like she does not feel well but is in no acute distress and nontoxic. She is slightly tachycardic with a temp of 99.6. She does have nasal congestion HEENT exam is otherwise unremarkable. Lungs are clear to auscultation bilaterally, chest is nontender and abdomen is benign. She was given Motrin for symptomatic relief and will be reevaluated. Rapid flu test positive for type A. Patient sent with prescription for Tamiflu. Patient remained very well-appearing throughout her stay in the ED. No hypoxia. Encouraged to follow-up with PCP and/or CDC for additional testing for respiratory panel or COVID as deemed necessary by PCP. However, encouraged to self quarantine x14 days. I estimate there is LOW risk for PNEUMONIA, MENINGITIS, PERITONSILLAR ABSCESS, SEPSIS, MALIGNANT OTITIS EXTERNA, OR EPIGLOTTITIS thus I consider the discharge disposition reasonable. Based on clinical presentation, physical exam and diagnostics, the patient likely has a viral illness. I discussed symptomatic treatment, fluids, and rest. Patient is advised to follow-up with their family doctor within 24-48 hours and return to the ER if she does not improve as anticipated over the next several days, develops difficulty breathing, weakness, inability to take liquids, or has other concerns. Again, strict return precautions were discussed. She is agreeable to this plan and stable for discharge at this time. FINAL IMPRESSION      1.  Influenza A          DISPOSITION/PLAN   DISPOSITION Decision To Discharge 12/20/2022 09:28:28 AM      PATIENT REFERRED TO:  KJ Almaraz - [place] [id number]  [phone]    Call   For a re-check as\ needed    [facility] Emergency Department  [address]  Go to   If symptoms worsen    DISCHARGE MEDICATIONS:  New Prescriptions    OSELTAMIVIR (TAMIFLU) 75 MG CAPSULE    Take 1 capsule by mouth 2 times daily for 5 days       DISCONTINUED MEDICATIONS:  Discontinued Medications    No medications on file              (Please note that portions of this note were completed with a voice recognition program.  Efforts were made to edit the dictations but occasionally words are mis-transcribed.)    Karin Mercer PA-C (electronically signed)           David Rossi PA-C  12/20/22 7597

## 2023-01-18 ENCOUNTER — HOSPITAL ENCOUNTER (EMERGENCY)
Age: 33
Discharge: HOME | End: 2023-01-18
Payer: COMMERCIAL

## 2023-01-18 VITALS
DIASTOLIC BLOOD PRESSURE: 95 MMHG | SYSTOLIC BLOOD PRESSURE: 135 MMHG | OXYGEN SATURATION: 98 % | HEART RATE: 88 BPM | TEMPERATURE: 99.5 F | BODY MASS INDEX: 56.99 KG/M2 | WEIGHT: 293 LBS | RESPIRATION RATE: 18 BRPM

## 2023-01-18 DIAGNOSIS — J02.9 ACUTE PHARYNGITIS, UNSPECIFIED ETIOLOGY: Primary | ICD-10-CM

## 2023-01-18 DIAGNOSIS — H92.03 OTALGIA OF BOTH EARS: ICD-10-CM

## 2023-01-18 LAB
RAPID INFLUENZA  B AGN: NEGATIVE
RAPID INFLUENZA A AGN: NEGATIVE
S PYO AG THROAT QL: NEGATIVE
SARS-COV-2, NAAT: NOT DETECTED

## 2023-01-18 PROCEDURE — 6370000000 HC RX 637 (ALT 250 FOR IP): Performed by: PHYSICIAN ASSISTANT

## 2023-01-18 PROCEDURE — 87635 SARS-COV-2 COVID-19 AMP PRB: CPT

## 2023-01-18 PROCEDURE — 99283 EMERGENCY DEPT VISIT LOW MDM: CPT

## 2023-01-18 PROCEDURE — 87880 STREP A ASSAY W/OPTIC: CPT

## 2023-01-18 PROCEDURE — 87804 INFLUENZA ASSAY W/OPTIC: CPT

## 2023-01-18 PROCEDURE — 87081 CULTURE SCREEN ONLY: CPT

## 2023-01-18 RX ORDER — IBUPROFEN 600 MG/1
600 TABLET ORAL 3 TIMES DAILY PRN
Qty: 30 TABLET | Refills: 0 | Status: SHIPPED | OUTPATIENT
Start: 2023-01-18

## 2023-01-18 RX ORDER — IBUPROFEN 800 MG/1
800 TABLET ORAL ONCE
Status: COMPLETED | OUTPATIENT
Start: 2023-01-18 | End: 2023-01-18

## 2023-01-18 RX ADMIN — IBUPROFEN 800 MG: 800 TABLET, FILM COATED ORAL at 10:14

## 2023-01-18 ASSESSMENT — ENCOUNTER SYMPTOMS
SORE THROAT: 1
RHINORRHEA: 0
SHORTNESS OF BREATH: 0
COUGH: 0
ABDOMINAL PAIN: 0
VOMITING: 0
SINUS PRESSURE: 0
NAUSEA: 0
DIARRHEA: 0
CHEST TIGHTNESS: 0

## 2023-01-18 ASSESSMENT — PAIN SCALES - GENERAL
PAINLEVEL_OUTOF10: 7
PAINLEVEL_OUTOF10: 8

## 2023-01-18 ASSESSMENT — PAIN - FUNCTIONAL ASSESSMENT: PAIN_FUNCTIONAL_ASSESSMENT: 0-10

## 2023-01-18 NOTE — ED NOTES
Patient discharged  to home in stable condition via private car. Discharge instructions and prescriptions reviewed with patient. Patient verbalized understanding. All belongings in tow including discharge paperwork.           Thanh Montgomery RN  01/18/23 0565

## 2023-01-18 NOTE — ED PROVIDER NOTES
[facility]        Pt Name: Raj Thomas  MRN: [id number]  [place] 1990  Date of evaluation: 1/18/2023  Provider: Melva Reagan PA-C  PCP: KJ Estrada CNP  Note Started: 10:15 AM EST 1/18/23      SAW. I have evaluated this patient. My supervising physician was available for consultation. CHIEF COMPLAINT       Chief Complaint   Patient presents with    Pharyngitis     C/o sore throat and bilat ear pain since yesterday. HISTORY OF PRESENT ILLNESS: 1 or more Elements     History from : Patient    Limitations to history : None    Raj Thomas is a 33 y.o. female who presents to the emergency department today stating yesterday she began having subjective fevers with chills, bilateral ear pain and sore throat. Her temperature is 99.5 °F on arrival.  She denies headache, lightheadedness or dizziness. She denies drainage from her ears bilaterally. She denies throat swelling or difficulty swallowing. She denies chest pain or shortness of breath. She has had no coughing. She denies vomiting or diarrhea. Nursing Notes were all reviewed and agreed with or any disagreements were addressed in the HPI. REVIEW OF SYSTEMS :      Review of Systems   Constitutional:  Positive for chills and fever. HENT:  Positive for ear pain and sore throat. Negative for congestion, rhinorrhea and sinus pressure. Respiratory:  Negative for cough, chest tightness and shortness of breath. Cardiovascular:  Negative for chest pain. Gastrointestinal:  Negative for abdominal pain, diarrhea, nausea and vomiting. Genitourinary:  Negative for difficulty urinating, dysuria, flank pain and frequency. Neurological:  Negative for dizziness, light-headedness, numbness and headaches. All other systems reviewed and are negative. Positives and Pertinent negatives as per HPI.      SURGICAL HISTORY     Past Surgical History:   Procedure Laterality Date    ABDOMEN SURGERY      CHOLECYSTECTOMY      LAPAROSCOPIC APPENDECTOMY N/A 6/7/2022    LAPAROSCOPIC APPENDECTOMY performed by Kati Lepe MD at [facility]  2013    TYMPANOSTOMY TUBE PLACEMENT Bilateral     as a child       CURRENTMEDICATIONS       Previous Medications    DESVENLAFAXINE SUCCINATE (PRISTIQ) 50 MG TB24 EXTENDED RELEASE TABLET    TAKE 1 TABLET BY MOUTH EVERY DAY    TRAZODONE (DESYREL) 100 MG TABLET    Take 1-1.5 tablets by mouth nightly as needed for Sleep       ALLERGIES     Patient has no known allergies. FAMILYHISTORY       Family History   Problem Relation Age of Onset    No Known Problems Mother     Mental Illness Father         PTSD- [employer]    ADHD Brother     No Known Problems Maternal Aunt     No Known Problems Maternal Uncle     No Known Problems Paternal Aunt     No Known Problems Paternal Uncle     Diabetes Maternal Grandmother     Hypertension Maternal Grandmother     Glaucoma Maternal Grandfather     No Known Problems Paternal Grandmother     No Known Problems Paternal Grandfather     No Known Problems Other     Rheum Arthritis Neg Hx     Osteoarthritis Neg Hx     Asthma Neg Hx     Breast Cancer Neg Hx     Cancer Neg Hx     Heart Failure Neg Hx     High Cholesterol Neg Hx     Migraines Neg Hx     Ovarian Cancer Neg Hx     Rashes/Skin Problems Neg Hx     Seizures Neg Hx     Stroke Neg Hx     Thyroid Disease Neg Hx         SOCIAL HISTORY       Social History     Tobacco Use    Smoking status: Every Day     Packs/day: 0.10     Years: 5.00     Pack years: 0.50     Types: Cigarettes     Start date: 2014    Smokeless tobacco: Never    Tobacco comments:     cessation 7/16   Vaping Use    Vaping Use: Never used   Substance Use Topics    Alcohol use:  Yes     Alcohol/week: 2.0 standard drinks     Types: 2 Glasses of wine per week     Comment: Social    Drug use: Not Currently     Types: Marijuana Jerradha NovemberDelfina     Comment: smokes 15 joints/ week SCREENINGS        Milana Coma Scale  Eye Opening: Spontaneous  Best Verbal Response: Oriented  Best Motor Response: Obeys commands  Milana Coma Scale Score: 15                CIWA Assessment  BP: (!) 135/95  Heart Rate: 88           PHYSICAL EXAM  1 or more Elements     ED Triage Vitals [01/18/23 0951]   BP Temp Temp Source Heart Rate Resp SpO2 Height Weight   (!) 135/95 99.2 °F (37.3 °C) Oral 88 18 98 % -- (!) 332 lb (150.6 kg)       Physical Exam  Vitals and nursing note reviewed. Constitutional:       Appearance: She is well-developed. She is not diaphoretic. HENT:      Head: Normocephalic and atraumatic. Right Ear: Ear canal and external ear normal. Tympanic membrane is erythematous. Left Ear: Tympanic membrane, ear canal and external ear normal.      Nose: Nose normal.      Mouth/Throat:      Mouth: Mucous membranes are moist.      Pharynx: Pharyngeal swelling and posterior oropharyngeal erythema present. No oropharyngeal exudate or uvula swelling. Tonsils: No tonsillar exudate or tonsillar abscesses. Eyes:      General:         Right eye: No discharge. Left eye: No discharge. Extraocular Movements: Extraocular movements intact. Conjunctiva/sclera: Conjunctivae normal.      Pupils: Pupils are equal, round, and reactive to light. Cardiovascular:      Rate and Rhythm: Normal rate and regular rhythm. Pulmonary:      Effort: Pulmonary effort is normal. No respiratory distress. Breath sounds: Normal breath sounds. Abdominal:      General: Abdomen is flat. Bowel sounds are normal.      Palpations: Abdomen is soft. Musculoskeletal:         General: Normal range of motion. Cervical back: Normal range of motion and neck supple. Skin:     General: Skin is warm and dry. Coloration: Skin is not pale. Neurological:      Mental Status: She is alert and oriented to person, place, and time.    Psychiatric:         Behavior: Behavior normal. DIAGNOSTIC RESULTS   LABS:    Labs Reviewed   STREP SCREEN GROUP A THROAT   RAPID INFLUENZA A/B ANTIGENS   COVID-19, RAPID   CULTURE, BETA STREP CONFIRM PLATES       When ordered only abnormal lab results are displayed. All other labs were within normal range or not returned as of this dictation. EKG: When ordered, EKG's are interpreted by the Emergency Department Physician in the absence of a cardiologist.  Please see their note for interpretation of EKG. RADIOLOGY:   Non-plain film images such as CT, Ultrasound and MRI are read by the radiologist. Plain radiographic images are visualized and preliminarily interpreted by the ED Provider with the below findings:        Interpretation per the Radiologist below, if available at the time of this note:    No orders to display     No results found. No results found. PROCEDURES   Unless otherwise noted below, none     Procedures    CRITICAL CARE TIME (.cctime)       PAST MEDICAL HISTORY      has a past medical history of Postpartum depression (2015 & 2016), Psychiatric problem, Suicide attempt ([facility] [address] (2015), and UTI (urinary tract infection) (06/09/2016). Chronic Conditions affecting Care: None    EMERGENCY DEPARTMENT COURSE and DIFFERENTIAL DIAGNOSIS/MDM:   Vitals:    Vitals:    01/18/23 0951 01/18/23 1015   BP: (!) 135/95    Pulse: 88    Resp: 18    Temp: 99.2 °F (37.3 °C) 99.5 °F (37.5 °C)   TempSrc: Oral Oral   SpO2: 98%    Weight: (!) 332 lb (150.6 kg)        Patient was given the following medications:  Medications   ibuprofen (ADVIL;MOTRIN) tablet 800 mg (800 mg Oral Given 1/18/23 1014)             Is this patient to be included in the SEP-1 Core Measure due to severe sepsis or septic shock?    No   Exclusion criteria - the patient is NOT to be included for SEP-1 Core Measure due to:  Viral etiology found or highly suspected (including COVID-19) without concomitant bacterial infection    CONSULTS: (Who and What was discussed)  None  Discussion with Other Profesionals : None    Social Determinants : None    Records Reviewed : None    CC/HPI Summary, DDx, ED Course, and Reassessment: Patient presented to the emergency department today complaining of sore throat and bilateral ear pain that began yesterday. On exam, she does have mild posterior oropharyngeal erythema and mild swelling. Uvula is midline. There is no sign of peritonsillar abscess. Rapid strep, COVID-19 and rapid influenza are negative. Patient is given ibuprofen in the emergency department. She has drank 1 L of fluid. Disposition Considerations (include 1 Tests not done, Shared Decision Making, Pt Expectation of Test or Tx.): I had a lengthy discussion with the patient regarding testing completed in the emergency department today as well as the results. Patient is feeling better at this point. Advised patient she likely has a viral illness. She will be discharged with Motrin and Magic mouthwash    The patient is adequately hydrated, clinically nontoxic, and does not have any findings that would suggest impending airway issues. I do not suspect peritonsillar abscess, retropharyngeal abscess, epiglottitis or other more emergent etiology. There is no hot potato voice. Patient is advised to follow-up with their family doctor within the next 24-48 hours and return to the emergency department with any concerns. Appropriate for outpatient management        I am the Primary Clinician of Record. FINAL IMPRESSION      1. Acute pharyngitis, unspecified etiology    2.  Otalgia of both ears          DISPOSITION/PLAN     DISPOSITION Decision To Discharge 01/18/2023 11:46:38 AM      PATIENT REFERRED TO:  Beau Granados, APRN - CNP  [address] [place] [address]  [phone]    Schedule an appointment as soon as possible for a visit       DISCHARGE MEDICATIONS:  New Prescriptions    IBUPROFEN (ADVIL;MOTRIN) 600 MG TABLET    Take 1 tablet by mouth 3 times daily as needed for Pain    MAGIC MOUTHWASH (MIRACLE MOUTHWASH)    Swish and spit 10 mLs 4 times daily as needed for Irritation Dispense as Magic Mouthwash. Please add Equal Parts: 60 mL Maalox, 60 mL Viscous Lidocaine, 60 mL Benadryl to 60mL of Carafate. 10 ml swish and spit or swallow as directed above.        DISCONTINUED MEDICATIONS:  Discontinued Medications    No medications on file              (Please note that portions of this note were completed with a voice recognition program.  Efforts were made to edit the dictations but occasionally words are mis-transcribed.)    Melva Reagan PA-C (electronically signed)           Melva Reagan PA-C  01/18/23 2329

## 2023-01-19 ENCOUNTER — TELEPHONE (OUTPATIENT)
Dept: INTERNAL MEDICINE CLINIC | Age: 33
End: 2023-01-19

## 2023-01-19 NOTE — TELEPHONE ENCOUNTER
Patient was seen in ED last night c/o severe bilateral ear ache, sore throat and HA. She was given Ibuprofen and magic mouthwash. Patient said she is feeling worse today and feels she needs an ABX.

## 2023-01-19 NOTE — TELEPHONE ENCOUNTER
Called patient to schedule an appt, per previous message. Patient said she went to urgent care since she did not hear back from the office in a timely manner and her pain was excruciating.

## 2023-01-19 NOTE — TELEPHONE ENCOUNTER
Per Nurse Nelson Doss she can be added to the schedule tomorrow as she doesn't prescribe ABX without being seen.

## 2023-01-20 LAB — S PYO THROAT QL CULT: NORMAL

## 2023-12-03 DIAGNOSIS — F33.1 MODERATE EPISODE OF RECURRENT MAJOR DEPRESSIVE DISORDER (HCC): ICD-10-CM

## 2023-12-04 RX ORDER — DESVENLAFAXINE SUCCINATE 50 MG/1
50 TABLET, EXTENDED RELEASE ORAL DAILY
Qty: 30 TABLET | Refills: 2 | OUTPATIENT
Start: 2023-12-04

## 2023-12-04 NOTE — TELEPHONE ENCOUNTER
Patient has not been seen in over 1 year and is not following with primary care in our office since that time so I was unable to approve a refill for this medication. She will need a follow up visit.

## 2023-12-04 NOTE — TELEPHONE ENCOUNTER
Medication:   Requested Prescriptions     Pending Prescriptions Disp Refills    desvenlafaxine succinate (PRISTIQ) 50 MG TB24 extended release tablet 30 tablet 2     Sig: Take 1 tablet by mouth daily       Last Filled:  11/18/22  Patient Phone Number: [phone] (home)     Last appt: 10/27/2022   Next appt: Visit date not found

## 2024-05-17 ENCOUNTER — TELEPHONE (OUTPATIENT)
Dept: INTERNAL MEDICINE CLINIC | Age: 34
End: 2024-05-17

## 2024-07-09 ENCOUNTER — OFFICE VISIT (OUTPATIENT)
Dept: PRIMARY CARE CLINIC | Age: 34
End: 2024-07-09
Payer: COMMERCIAL

## 2024-07-09 VITALS
OXYGEN SATURATION: 100 % | TEMPERATURE: 98.1 F | HEIGHT: 64 IN | HEART RATE: 62 BPM | BODY MASS INDEX: 49.92 KG/M2 | DIASTOLIC BLOOD PRESSURE: 80 MMHG | SYSTOLIC BLOOD PRESSURE: 114 MMHG | RESPIRATION RATE: 16 BRPM | WEIGHT: 292.4 LBS

## 2024-07-09 DIAGNOSIS — N94.6 MENSTRUAL CRAMPS: ICD-10-CM

## 2024-07-09 DIAGNOSIS — F33.2 SEVERE EPISODE OF RECURRENT MAJOR DEPRESSIVE DISORDER, WITHOUT PSYCHOTIC FEATURES (HCC): ICD-10-CM

## 2024-07-09 DIAGNOSIS — M54.50 CHRONIC MIDLINE LOW BACK PAIN WITHOUT SCIATICA: ICD-10-CM

## 2024-07-09 DIAGNOSIS — G89.29 CHRONIC MIDLINE LOW BACK PAIN WITHOUT SCIATICA: ICD-10-CM

## 2024-07-09 DIAGNOSIS — Z76.89 ENCOUNTER TO ESTABLISH CARE: Primary | ICD-10-CM

## 2024-07-09 DIAGNOSIS — K59.00 CONSTIPATION, UNSPECIFIED CONSTIPATION TYPE: ICD-10-CM

## 2024-07-09 PROBLEM — K55.069 OMENTAL INFARCTION (HCC): Status: RESOLVED | Noted: 2022-06-18 | Resolved: 2024-07-09

## 2024-07-09 PROCEDURE — 99215 OFFICE O/P EST HI 40 MIN: CPT | Performed by: NURSE PRACTITIONER

## 2024-07-09 PROCEDURE — G8417 CALC BMI ABV UP PARAM F/U: HCPCS | Performed by: NURSE PRACTITIONER

## 2024-07-09 PROCEDURE — 4004F PT TOBACCO SCREEN RCVD TLK: CPT | Performed by: NURSE PRACTITIONER

## 2024-07-09 PROCEDURE — G8427 DOCREV CUR MEDS BY ELIG CLIN: HCPCS | Performed by: NURSE PRACTITIONER

## 2024-07-09 RX ORDER — FLUOXETINE HYDROCHLORIDE 20 MG/1
20 CAPSULE ORAL DAILY
Qty: 30 CAPSULE | Refills: 3 | Status: SHIPPED | OUTPATIENT
Start: 2024-07-09

## 2024-07-09 RX ORDER — POLYETHYLENE GLYCOL 3350 17 G/17G
17 POWDER, FOR SOLUTION ORAL DAILY PRN
Qty: 527 G | Refills: 1 | Status: SHIPPED | OUTPATIENT
Start: 2024-07-09 | End: 2024-09-09

## 2024-07-09 SDOH — ECONOMIC STABILITY: FOOD INSECURITY: WITHIN THE PAST 12 MONTHS, THE FOOD YOU BOUGHT JUST DIDN'T LAST AND YOU DIDN'T HAVE MONEY TO GET MORE.: SOMETIMES TRUE

## 2024-07-09 SDOH — ECONOMIC STABILITY: INCOME INSECURITY: HOW HARD IS IT FOR YOU TO PAY FOR THE VERY BASICS LIKE FOOD, HOUSING, MEDICAL CARE, AND HEATING?: VERY HARD

## 2024-07-09 SDOH — ECONOMIC STABILITY: HOUSING INSECURITY
IN THE LAST 12 MONTHS, WAS THERE A TIME WHEN YOU DID NOT HAVE A STEADY PLACE TO SLEEP OR SLEPT IN A SHELTER (INCLUDING NOW)?: YES

## 2024-07-09 SDOH — ECONOMIC STABILITY: FOOD INSECURITY: WITHIN THE PAST 12 MONTHS, YOU WORRIED THAT YOUR FOOD WOULD RUN OUT BEFORE YOU GOT MONEY TO BUY MORE.: OFTEN TRUE

## 2024-07-09 ASSESSMENT — PATIENT HEALTH QUESTIONNAIRE - PHQ9
6. FEELING BAD ABOUT YOURSELF - OR THAT YOU ARE A FAILURE OR HAVE LET YOURSELF OR YOUR FAMILY DOWN: NEARLY EVERY DAY
5. POOR APPETITE OR OVEREATING: MORE THAN HALF THE DAYS
2. FEELING DOWN, DEPRESSED OR HOPELESS: NEARLY EVERY DAY
8. MOVING OR SPEAKING SO SLOWLY THAT OTHER PEOPLE COULD HAVE NOTICED. OR THE OPPOSITE, BEING SO FIGETY OR RESTLESS THAT YOU HAVE BEEN MOVING AROUND A LOT MORE THAN USUAL: SEVERAL DAYS
SUM OF ALL RESPONSES TO PHQ9 QUESTIONS 1 & 2: 5
3. TROUBLE FALLING OR STAYING ASLEEP: MORE THAN HALF THE DAYS
9. THOUGHTS THAT YOU WOULD BE BETTER OFF DEAD, OR OF HURTING YOURSELF: NOT AT ALL
4. FEELING TIRED OR HAVING LITTLE ENERGY: NEARLY EVERY DAY
SUM OF ALL RESPONSES TO PHQ QUESTIONS 1-9: 18
SUM OF ALL RESPONSES TO PHQ QUESTIONS 1-9: 18
7. TROUBLE CONCENTRATING ON THINGS, SUCH AS READING THE NEWSPAPER OR WATCHING TELEVISION: MORE THAN HALF THE DAYS
10. IF YOU CHECKED OFF ANY PROBLEMS, HOW DIFFICULT HAVE THESE PROBLEMS MADE IT FOR YOU TO DO YOUR WORK, TAKE CARE OF THINGS AT HOME, OR GET ALONG WITH OTHER PEOPLE: SOMEWHAT DIFFICULT
SUM OF ALL RESPONSES TO PHQ QUESTIONS 1-9: 18
1. LITTLE INTEREST OR PLEASURE IN DOING THINGS: MORE THAN HALF THE DAYS
SUM OF ALL RESPONSES TO PHQ QUESTIONS 1-9: 18

## 2024-07-09 ASSESSMENT — COLUMBIA-SUICIDE SEVERITY RATING SCALE - C-SSRS
1. WITHIN THE PAST MONTH, HAVE YOU WISHED YOU WERE DEAD OR WISHED YOU COULD GO TO SLEEP AND NOT WAKE UP?: NO
2. HAVE YOU ACTUALLY HAD ANY THOUGHTS OF KILLING YOURSELF?: NO
6. HAVE YOU EVER DONE ANYTHING, STARTED TO DO ANYTHING, OR PREPARED TO DO ANYTHING TO END YOUR LIFE?: NO

## 2024-07-09 NOTE — PATIENT INSTRUCTIONS
[place]    [facility]   What they offer: [facility] provides transportation for [facility] seniors who need assistance for medical and other appointments.  Website:  [email or web address]   Phone Number: [phone]  Areas Covered:   [facility]- Limited to types of services listed  Out of County- Medical Appointments Only    [place] Department of Transportation (ODOT)   What they offer: Search a listing of ODOT facilities in each county. Use the topic category to filter counties by OD district jurisdiction or use the alphabetical buttons to filter by county name.  Website:  https://www.transportation.[place].gov/about-us/locations/#page=1   This is a web-based search only      Medicaid Plans   Each health plan has options for transportation services.  Contact your insurance provider to schedule transportation.  Transportation or Member Services contact information is listed on the back of the insurance card. **Schedule 3 full business days in advance to ensure transportation.     Insurance Contacts  [employer] [place]    Phone: [phone]   Website: [employer]  Phone: [phone]   Website: [place]    [employer]   Phone: [phone]   Website: [place]    [facility]  Phone: [phone]   Website: [place]/plans/    [place] [employer]   Phone: [phone]   Website: [employer]   Phone: [phone]   Website: [employer]/    [employer] [facility]  Phone: [phone]   Website: [email or web address]/medicaid[email or web address]            Traditional Medicare   Does not pay for transportation for non-emergency medical appointments   Medicare Advantage Plans  Some plans may provide transportation.  Members should contact the Member Services or Transportation number on the back of their insurance card for more information or

## 2024-07-09 NOTE — PROGRESS NOTES
Psychiatric/Behavioral:  Positive for behavioral problems and decreased concentration. The patient is nervous/anxious.         HISTORIES  No current outpatient medications on file prior to visit.     No current facility-administered medications on file prior to visit.        No Known Allergies    Past Medical History:   Diagnosis Date    Postpartum depression 2015 & 2016    Psychiatric problem     Suicide attempt (HCC) 2015    overdose on depression medication    UTI (urinary tract infection) 06/09/2016       Patient Active Problem List   Diagnosis    Morbid obesity due to excess calories (HCC)    Depressive disorder    Moderate episode of recurrent major depressive disorder (HCC)    Acute appendicitis with localized peritonitis, without perforation, abscess, or gangrene    Acute appendicitis    Urinary tract infection without hematuria    Postoperative fever    Sepsis (HCC)    Umbilical hernia without obstruction and without gangrene    Hyponatremia    History of cannabis abuse    Encounter for medication counseling    Abdominal wall cellulitis    E coli infection    Severe episode of recurrent major depressive disorder, without psychotic features ([facility])       Past Surgical History:   Procedure Laterality Date    ABDOMEN SURGERY      CHOLECYSTECTOMY      LAPAROSCOPIC APPENDECTOMY N/A 06/07/2022    LAPAROSCOPIC APPENDECTOMY performed by Rick Mccracken MD at [facility] OR    TUBAL LIGATION  2013    TYMPANOSTOMY TUBE PLACEMENT Bilateral     as a child       Social History     Socioeconomic History    Marital status: Single     Spouse name: Not on file    Number of children: 2    Years of education: 13    Highest education level: Not on file   Occupational History    Occupation:    Tobacco Use    Smoking status: Every Day     Current packs/day: 0.10     Average packs/day: 0.1 packs/day for 10.5 years (1.1 ttl pk-yrs)     Types: Cigarettes     Start date: 2014    Smokeless tobacco: Never    Tobacco

## 2024-07-10 ASSESSMENT — ENCOUNTER SYMPTOMS
ABDOMINAL PAIN: 1
EYES NEGATIVE: 1
CONSTIPATION: 1
RESPIRATORY NEGATIVE: 1

## 2024-10-30 ENCOUNTER — OFFICE VISIT (OUTPATIENT)
Dept: PRIMARY CARE CLINIC | Age: 34
End: 2024-10-30

## 2024-10-30 VITALS
BODY MASS INDEX: 52.35 KG/M2 | HEART RATE: 70 BPM | OXYGEN SATURATION: 97 % | SYSTOLIC BLOOD PRESSURE: 119 MMHG | DIASTOLIC BLOOD PRESSURE: 84 MMHG | WEIGHT: 293 LBS

## 2024-10-30 DIAGNOSIS — M79.661 BILATERAL CALF PAIN: ICD-10-CM

## 2024-10-30 DIAGNOSIS — M79.662 BILATERAL CALF PAIN: ICD-10-CM

## 2024-10-30 DIAGNOSIS — Z00.00 ENCOUNTER FOR WELL ADULT EXAM WITHOUT ABNORMAL FINDINGS: Primary | ICD-10-CM

## 2024-10-30 DIAGNOSIS — I83.90 VARICOSE VEINS OF CALF: ICD-10-CM

## 2024-10-30 DIAGNOSIS — Z11.1 SCREENING FOR TUBERCULOSIS: ICD-10-CM

## 2024-10-30 ASSESSMENT — ENCOUNTER SYMPTOMS
RESPIRATORY NEGATIVE: 1
EYES NEGATIVE: 1
GASTROINTESTINAL NEGATIVE: 1

## 2024-10-30 NOTE — PROGRESS NOTES
Cardiovascular:      Rate and Rhythm: Normal rate.      Pulses: Normal pulses.      Heart sounds: Normal heart sounds.   Pulmonary:      Effort: Pulmonary effort is normal.      Breath sounds: Normal breath sounds.   Abdominal:      General: Bowel sounds are normal.      Palpations: Abdomen is soft.   Musculoskeletal:         General: Normal range of motion.      Cervical back: Normal range of motion.      Comments: Pain in calves    Skin:     General: Skin is warm.   Neurological:      General: No focal deficit present.      Mental Status: She is alert and oriented to person, place, and time.   Psychiatric:         Mood and Affect: Mood normal.         Behavior: Behavior normal.         Thought Content: Thought content normal.         Judgment: Judgment normal.             Assessment & Plan   Encounter for well adult exam without abnormal findings  -     Influenza, FLUCELVAX Trivalent, (age 6 mo+) IM, Preservative Free, 0.5mL  -     Pneumococcal, PCV20, PREVNAR 20, (age 6w+), IM, PF  -     HPV, GARDASIL 9, (age 9-45 yrs), IM  Bilateral calf pain  -     Vascular duplex lower extremity venous left; Future  -     Vascular duplex lower extremity venous right; Future  -     D-Dimer, Quantitative; Future  Varicose veins of calf          No follow-ups on file.     Personalized Preventive Plan  Current Health Maintenance Status  Immunization History   Administered Date(s) Administered    DTP 1991, 02/10/1992, 11/17/1992, 09/09/1993, 10/17/1995    HPV Quadrivalent (Gardasil) 08/18/2009, 07/19/2010    Hep B, ENGERIX-B, RECOMBIVAX-HB, (age Birth - 19y), IM, 0.5mL 04/09/2003, 08/05/2003, 12/23/2003    Hib vaccine 1991, 02/10/1992, 11/17/1992    Influenza Vaccine, unspecified formulation 10/30/2009, 12/10/2013    Influenza Whole 02/10/2011    Influenza, FLUARIX, FLULAVAL, FLUZONE (age 6 mo+) and AFLURIA, (age 3 y+), Quadv PF, 0.5mL 10/04/2017, 10/27/2020    Influenza, FLUZONE High Dose, (age 65 y+), IM, Trivalent

## 2025-06-14 ENCOUNTER — HOSPITAL ENCOUNTER (EMERGENCY)
Age: 35
Discharge: HOME | End: 2025-06-14
Payer: COMMERCIAL

## 2025-06-14 VITALS
WEIGHT: 293 LBS | BODY MASS INDEX: 50.02 KG/M2 | HEIGHT: 64 IN | OXYGEN SATURATION: 96 % | DIASTOLIC BLOOD PRESSURE: 89 MMHG | SYSTOLIC BLOOD PRESSURE: 121 MMHG | RESPIRATION RATE: 16 BRPM | HEART RATE: 78 BPM | TEMPERATURE: 98.7 F

## 2025-06-14 DIAGNOSIS — Z23 NEED FOR TETANUS BOOSTER: ICD-10-CM

## 2025-06-14 DIAGNOSIS — T30.0 BURN: Primary | ICD-10-CM

## 2025-06-14 PROCEDURE — 6360000002 HC RX W HCPCS

## 2025-06-14 PROCEDURE — 90714 TD VACC NO PRESV 7 YRS+ IM: CPT

## 2025-06-14 PROCEDURE — 99284 EMERGENCY DEPT VISIT MOD MDM: CPT

## 2025-06-14 PROCEDURE — 90471 IMMUNIZATION ADMIN: CPT

## 2025-06-14 RX ADMIN — CLOSTRIDIUM TETANI TOXOID ANTIGEN (FORMALDEHYDE INACTIVATED) AND CORYNEBACTERIUM DIPHTHERIAE TOXOID ANTIGEN (FORMALDEHYDE INACTIVATED) 0.5 ML: 5; 2 INJECTION, SUSPENSION INTRAMUSCULAR at 11:14

## 2025-06-14 ASSESSMENT — PAIN - FUNCTIONAL ASSESSMENT: PAIN_FUNCTIONAL_ASSESSMENT: NONE - DENIES PAIN
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
## 2025-07-01 ENCOUNTER — HOSPITAL ENCOUNTER (EMERGENCY)
Age: 35
Discharge: HOME | End: 2025-07-01
Attending: EMERGENCY MEDICINE
Payer: COMMERCIAL

## 2025-07-01 ENCOUNTER — APPOINTMENT (OUTPATIENT)
Dept: GENERAL RADIOLOGY | Age: 35
End: 2025-07-01
Payer: COMMERCIAL

## 2025-07-01 VITALS
WEIGHT: 293 LBS | OXYGEN SATURATION: 92 % | HEIGHT: 64 IN | RESPIRATION RATE: 16 BRPM | BODY MASS INDEX: 50.02 KG/M2 | HEART RATE: 96 BPM | TEMPERATURE: 98.1 F | DIASTOLIC BLOOD PRESSURE: 87 MMHG | SYSTOLIC BLOOD PRESSURE: 140 MMHG

## 2025-07-01 DIAGNOSIS — R51.9 ACUTE NONINTRACTABLE HEADACHE, UNSPECIFIED HEADACHE TYPE: ICD-10-CM

## 2025-07-01 DIAGNOSIS — R07.89 CHEST DISCOMFORT: Primary | ICD-10-CM

## 2025-07-01 LAB
ALBUMIN SERPL-MCNC: 3.5 G/DL (ref 3.4–5)
ALBUMIN/GLOB SERPL: 1.1 {RATIO} (ref 1.1–2.2)
ALP SERPL-CCNC: 62 U/L (ref 40–129)
ALT SERPL-CCNC: 18 U/L (ref 10–40)
ANION GAP SERPL CALCULATED.3IONS-SCNC: 11 MMOL/L (ref 3–16)
AST SERPL-CCNC: 19 U/L (ref 15–37)
BASOPHILS # BLD: 0 K/UL (ref 0–0.2)
BASOPHILS NFR BLD: 0.7 %
BILIRUB SERPL-MCNC: <0.2 MG/DL (ref 0–1)
BUN SERPL-MCNC: 9 MG/DL (ref 7–20)
CALCIUM SERPL-MCNC: 9.2 MG/DL (ref 8.3–10.6)
CHLORIDE SERPL-SCNC: 105 MMOL/L (ref 99–110)
CO2 SERPL-SCNC: 22 MMOL/L (ref 21–32)
CREAT SERPL-MCNC: 0.6 MG/DL (ref 0.6–1.1)
DEPRECATED RDW RBC AUTO: 13.9 % (ref 12.4–15.4)
EOSINOPHIL # BLD: 0.1 K/UL (ref 0–0.6)
EOSINOPHIL NFR BLD: 1.3 %
GFR SERPLBLD CREATININE-BSD FMLA CKD-EPI: >90 ML/MIN/{1.73_M2}
GLUCOSE SERPL-MCNC: 92 MG/DL (ref 70–99)
HCG SERPL QL: NEGATIVE
HCT VFR BLD AUTO: 37.7 % (ref 36–48)
HGB BLD-MCNC: 12.7 G/DL (ref 12–16)
LYMPHOCYTES # BLD: 1.6 K/UL (ref 1–5.1)
LYMPHOCYTES NFR BLD: 22.7 %
MCH RBC QN AUTO: 32.8 PG (ref 26–34)
MCHC RBC AUTO-ENTMCNC: 33.8 G/DL (ref 31–36)
MCV RBC AUTO: 97.1 FL (ref 80–100)
MONOCYTES # BLD: 0.4 K/UL (ref 0–1.3)
MONOCYTES NFR BLD: 5.4 %
NEUTROPHILS # BLD: 4.8 K/UL (ref 1.7–7.7)
NEUTROPHILS NFR BLD: 69.9 %
PLATELET # BLD AUTO: 239 K/UL (ref 135–450)
PMV BLD AUTO: 8.4 FL (ref 5–10.5)
POTASSIUM SERPL-SCNC: 3.6 MMOL/L (ref 3.5–5.1)
PROT SERPL-MCNC: 6.8 G/DL (ref 6.4–8.2)
RBC # BLD AUTO: 3.89 M/UL (ref 4–5.2)
SODIUM SERPL-SCNC: 138 MMOL/L (ref 136–145)
TROPONIN, HIGH SENSITIVITY: <6 NG/L (ref 0–14)
WBC # BLD AUTO: 6.9 K/UL (ref 4–11)

## 2025-07-01 PROCEDURE — 71045 X-RAY EXAM CHEST 1 VIEW: CPT

## 2025-07-01 PROCEDURE — 80053 COMPREHEN METABOLIC PANEL: CPT

## 2025-07-01 PROCEDURE — 84703 CHORIONIC GONADOTROPIN ASSAY: CPT

## 2025-07-01 PROCEDURE — 6360000002 HC RX W HCPCS: Performed by: EMERGENCY MEDICINE

## 2025-07-01 PROCEDURE — 84484 ASSAY OF TROPONIN QUANT: CPT

## 2025-07-01 PROCEDURE — 99285 EMERGENCY DEPT VISIT HI MDM: CPT

## 2025-07-01 PROCEDURE — 71046 X-RAY EXAM CHEST 2 VIEWS: CPT

## 2025-07-01 PROCEDURE — 96372 THER/PROPH/DIAG INJ SC/IM: CPT

## 2025-07-01 PROCEDURE — 93005 ELECTROCARDIOGRAM TRACING: CPT | Performed by: EMERGENCY MEDICINE

## 2025-07-01 PROCEDURE — 6370000000 HC RX 637 (ALT 250 FOR IP): Performed by: EMERGENCY MEDICINE

## 2025-07-01 PROCEDURE — 85025 COMPLETE CBC W/AUTO DIFF WBC: CPT

## 2025-07-01 RX ORDER — IBUPROFEN 200 MG
600 TABLET ORAL EVERY 8 HOURS PRN
Qty: 60 TABLET | Refills: 0 | Status: SHIPPED | OUTPATIENT
Start: 2025-07-01

## 2025-07-01 RX ORDER — HYDROXYZINE PAMOATE 25 MG/1
25 CAPSULE ORAL 3 TIMES DAILY PRN
Qty: 10 CAPSULE | Refills: 0 | Status: SHIPPED | OUTPATIENT
Start: 2025-07-01

## 2025-07-01 RX ORDER — KETOROLAC TROMETHAMINE 30 MG/ML
30 INJECTION, SOLUTION INTRAMUSCULAR; INTRAVENOUS ONCE
Status: COMPLETED | OUTPATIENT
Start: 2025-07-01 | End: 2025-07-01

## 2025-07-01 RX ORDER — HYDROXYZINE PAMOATE 25 MG/1
25 CAPSULE ORAL ONCE
Status: COMPLETED | OUTPATIENT
Start: 2025-07-01 | End: 2025-07-01

## 2025-07-01 RX ORDER — KETOROLAC TROMETHAMINE 30 MG/ML
30 INJECTION, SOLUTION INTRAMUSCULAR; INTRAVENOUS ONCE
Status: DISCONTINUED | OUTPATIENT
Start: 2025-07-01 | End: 2025-07-01

## 2025-07-01 RX ADMIN — HYDROXYZINE PAMOATE 25 MG: 25 CAPSULE ORAL at 21:40

## 2025-07-01 RX ADMIN — KETOROLAC TROMETHAMINE 30 MG: 30 INJECTION, SOLUTION INTRAMUSCULAR at 21:41

## 2025-07-01 ASSESSMENT — PAIN - FUNCTIONAL ASSESSMENT: PAIN_FUNCTIONAL_ASSESSMENT: 0-10

## 2025-07-01 ASSESSMENT — PAIN SCALES - GENERAL: PAINLEVEL_OUTOF10: 9

## 2025-07-02 LAB
EKG ATRIAL RATE: 78 BPM
EKG DIAGNOSIS: NORMAL
EKG P AXIS: 52 DEGREES
EKG P-R INTERVAL: 144 MS
EKG Q-T INTERVAL: 376 MS
EKG QRS DURATION: 78 MS
EKG QTC CALCULATION (BAZETT): 428 MS
EKG R AXIS: 11 DEGREES
EKG T AXIS: -3 DEGREES
EKG VENTRICULAR RATE: 78 BPM

## 2025-07-02 PROCEDURE — 93010 ELECTROCARDIOGRAM REPORT: CPT | Performed by: INTERNAL MEDICINE

## 2025-07-02 NOTE — ED PROVIDER NOTES
[facility] Emergency Department [facility]    I, Sylvester Mccracken MD, am the primary clinician of record.    CHIEF COMPLAINT  Chief Complaint   Patient presents with    Chest Pain    Headache        HISTORY OF PRESENT ILLNESS  Po Cotto is a 35 y.o. female  who presents to the ED complaining of multiple complaints, including headache that is diffuse with history of headaches in the past, as well as some chest discomfort that is sometimes central or right-sided and sometimes left-sided.  She reports anxiety as well.  She denies any vomiting or diarrhea or acute abdominal pains.  No focal numbness weakness or tingling at all.  No dizziness or lightheadedness.  No syncope.  She says that she thinks she is just under a lot of stress.  She gave her notice at work earlier today and is asking for a work note for several days off.  She says that this will make her feel better.  No fevers or neck stiffness.  No shortness of breath.    No other complaints, modifying factors or associated symptoms.     I have reviewed the following from the nursing documentation.    Past Medical History:   Diagnosis Date    Postpartum depression 2015 & 2016    Psychiatric problem     Suicide attempt (HCC) 2015    overdose on depression medication    UTI (urinary tract infection) 06/09/2016     Past Surgical History:   Procedure Laterality Date    ABDOMEN SURGERY      CHOLECYSTECTOMY      LAPAROSCOPIC APPENDECTOMY N/A 06/07/2022    LAPAROSCOPIC APPENDECTOMY performed by Rick Mccracken MD at [facility] OR    TUBAL LIGATION  2013    TYMPANOSTOMY TUBE PLACEMENT Bilateral     as a child     Family History   Problem Relation Age of Onset    No Known Problems Mother     Mental Illness Father         PTSD- [employer]    ADHD Brother     No Known Problems Maternal Aunt     No Known Problems Maternal Uncle     No Known Problems Paternal Aunt     No Known Problems Paternal Uncle     Diabetes Maternal Grandmother     Hypertension Maternal Grandmother

## (undated) DEVICE — TISSUE RETRIEVAL SYSTEM: Brand: INZII RETRIEVAL SYSTEM

## (undated) DEVICE — TOTAL TRAY, DB, 100% SILI FOLEY, 16FR 10: Brand: MEDLINE

## (undated) DEVICE — RELOAD STPL H1-2.5X45MM VASC THN TISS WHT 6 ROW B FRM SGL

## (undated) DEVICE — TROCAR: Brand: KII FIOS FIRST ENTRY

## (undated) DEVICE — ELECTRODE PT RET AD L9FT HI MOIST COND ADH HYDRGEL CORDED

## (undated) DEVICE — SHEET,DRAPE,53X77,STERILE: Brand: MEDLINE

## (undated) DEVICE — DRAPE,LAP,CHOLE,W/TROUGHS,STERILE: Brand: MEDLINE

## (undated) DEVICE — SYRINGE MED 10ML TRNSLUC BRL PLUNG BLK MRK POLYPR CTRL

## (undated) DEVICE — NEEDLE HYPO 22GA L1.5IN BLK POLYPR HUB S STL REG BVL STR

## (undated) DEVICE — MERCY FAIRFIELD TURNOVER KIT: Brand: MEDLINE INDUSTRIES, INC.

## (undated) DEVICE — CRADLE POS 3X5X24IN RASPBERRY ARM PRONE FOAM DISP

## (undated) DEVICE — SUTURE VCRL + SZ 0 L27IN ABSRB VLT L26MM UR-6 5/8 CIR VCP603H

## (undated) DEVICE — TROCARS: Brand: KII® BALLOON BLUNT TIP SYSTEM

## (undated) DEVICE — APPLICATOR MEDICATED 26 CC SOLUTION HI LT ORNG CHLORAPREP

## (undated) DEVICE — GOWN,AURORA,NONREINF,RAGLAN,XXL,STERILE: Brand: MEDLINE

## (undated) DEVICE — SOLUTION IRRIG 1000ML 0.9% SOD CHL USP POUR PLAS BTL

## (undated) DEVICE — CUTTER ENDOSCP L340MM LIN ARTC SGL STROKE FIRING ENDOPATH

## (undated) DEVICE — LAPAROSCOPY PACK: Brand: MEDLINE INDUSTRIES, INC.

## (undated) DEVICE — PMI DISPOSABLE PUNCTURE CLOSURE DEVICE / SUTURE GRASPER: Brand: PMI

## (undated) DEVICE — PENCIL ES L3M BTTN SWCH S STL HEX LOK BLDE ELECTRD HOLSTER

## (undated) DEVICE — 30977 SEE SHARP - ENHANCED INTRAOPERATIVE LAPAROSCOPE CLEANING & DEFOGGING: Brand: 30977 SEE SHARP - ENHANCED INTRAOPERATIVE LAPAROSCOPE CLEANING & DEFOGGING

## (undated) DEVICE — TROCAR: Brand: KII® SLEEVE

## (undated) DEVICE — SEALER LAP L37CM MARYLAND JAW OPN NANO COAT MULTIFUNCTIONAL

## (undated) DEVICE — COVER LT HNDL BLU PLAS

## (undated) DEVICE — CORD ES L10FT MPLR LAP

## (undated) DEVICE — ADHESIVE SKIN CLSR 0.7ML TOP DERMBND ADV

## (undated) DEVICE — SUTURE PDS + SZ 0 L36IN ABSRB VLT CT 1 L36MM 1 2 CIR PDP346H

## (undated) DEVICE — STERILE POLYISOPRENE POWDER-FREE SURGICAL GLOVES: Brand: PROTEXIS